# Patient Record
Sex: MALE
[De-identification: names, ages, dates, MRNs, and addresses within clinical notes are randomized per-mention and may not be internally consistent; named-entity substitution may affect disease eponyms.]

---

## 2017-12-10 PROBLEM — N32.89 BLADDER SPASMS: Status: ACTIVE | Noted: 2017-12-10

## 2018-03-21 PROBLEM — C67.9 MALIGNANT NEOPLASM OF URINARY BLADDER, UNSPECIFIED SITE (HCC): Status: ACTIVE | Noted: 2018-03-21

## 2018-10-16 PROBLEM — M54.41 CHRONIC BILATERAL LOW BACK PAIN WITH BILATERAL SCIATICA: Status: ACTIVE | Noted: 2018-10-16

## 2018-10-16 PROBLEM — M54.42 CHRONIC BILATERAL LOW BACK PAIN WITH BILATERAL SCIATICA: Status: ACTIVE | Noted: 2018-10-16

## 2018-10-16 PROBLEM — G89.29 CHRONIC BILATERAL LOW BACK PAIN WITH BILATERAL SCIATICA: Status: ACTIVE | Noted: 2018-10-16

## 2018-11-09 ENCOUNTER — HOSPITAL (OUTPATIENT)
Dept: OTHER | Age: 78
End: 2018-11-09
Attending: ORTHOPAEDIC SURGERY

## 2018-11-09 ENCOUNTER — IMAGING SERVICES (OUTPATIENT)
Dept: OTHER | Age: 78
End: 2018-11-09

## 2018-11-12 ENCOUNTER — HOSPITAL (OUTPATIENT)
Dept: OTHER | Age: 78
End: 2018-11-12
Attending: ORTHOPAEDIC SURGERY

## 2018-11-12 ENCOUNTER — IMAGING SERVICES (OUTPATIENT)
Dept: OTHER | Age: 78
End: 2018-11-12

## 2018-11-28 ENCOUNTER — LAB ENCOUNTER (OUTPATIENT)
Dept: LAB | Facility: HOSPITAL | Age: 78
End: 2018-11-28
Attending: ORTHOPAEDIC SURGERY
Payer: MEDICARE

## 2018-11-28 DIAGNOSIS — I10 ESSENTIAL HYPERTENSION: ICD-10-CM

## 2018-11-28 DIAGNOSIS — M25.551 RIGHT HIP PAIN: Primary | ICD-10-CM

## 2018-11-28 DIAGNOSIS — N18.30 CKD (CHRONIC KIDNEY DISEASE) STAGE 3, GFR 30-59 ML/MIN (HCC): ICD-10-CM

## 2018-11-28 DIAGNOSIS — E79.0 HYPERURICEMIA: ICD-10-CM

## 2018-11-28 PROCEDURE — 86140 C-REACTIVE PROTEIN: CPT

## 2018-11-28 PROCEDURE — 85025 COMPLETE CBC W/AUTO DIFF WBC: CPT

## 2018-11-28 PROCEDURE — 85652 RBC SED RATE AUTOMATED: CPT

## 2018-11-28 PROCEDURE — 84550 ASSAY OF BLOOD/URIC ACID: CPT

## 2018-11-28 PROCEDURE — 87086 URINE CULTURE/COLONY COUNT: CPT

## 2018-11-28 PROCEDURE — 80053 COMPREHEN METABOLIC PANEL: CPT

## 2018-11-28 PROCEDURE — 85610 PROTHROMBIN TIME: CPT

## 2018-11-28 PROCEDURE — 85730 THROMBOPLASTIN TIME PARTIAL: CPT

## 2018-11-28 PROCEDURE — 36415 COLL VENOUS BLD VENIPUNCTURE: CPT

## 2018-11-30 NOTE — PROGRESS NOTES
Released to [address] [place] [address] [address], along with provider's comments/instructions.

## 2018-12-05 ENCOUNTER — LAB ENCOUNTER (OUTPATIENT)
Dept: LAB | Age: 78
DRG: 467 | End: 2018-12-05
Attending: ORTHOPAEDIC SURGERY
Payer: MEDICARE

## 2018-12-05 DIAGNOSIS — M75.100 ROTATOR CUFF SYNDROME: ICD-10-CM

## 2018-12-05 DIAGNOSIS — Z01.818 PREOP TESTING: ICD-10-CM

## 2018-12-05 PROCEDURE — 86850 RBC ANTIBODY SCREEN: CPT

## 2018-12-05 PROCEDURE — 86900 BLOOD TYPING SEROLOGIC ABO: CPT

## 2018-12-05 PROCEDURE — 36415 COLL VENOUS BLD VENIPUNCTURE: CPT

## 2018-12-05 PROCEDURE — 87641 MR-STAPH DNA AMP PROBE: CPT

## 2018-12-05 PROCEDURE — 86901 BLOOD TYPING SEROLOGIC RH(D): CPT

## 2018-12-05 RX ORDER — ACETAMINOPHEN 500 MG
1000 TABLET ORAL ONCE
Status: DISCONTINUED | OUTPATIENT
Start: 2018-12-05 | End: 2018-12-05

## 2018-12-06 ENCOUNTER — APPOINTMENT (OUTPATIENT)
Dept: GENERAL RADIOLOGY | Facility: HOSPITAL | Age: 78
DRG: 467 | End: 2018-12-06
Attending: ORTHOPAEDIC SURGERY
Payer: MEDICARE

## 2018-12-06 ENCOUNTER — ANESTHESIA EVENT (OUTPATIENT)
Dept: SURGERY | Facility: HOSPITAL | Age: 78
DRG: 467 | End: 2018-12-06
Payer: MEDICARE

## 2018-12-06 ENCOUNTER — ANESTHESIA (OUTPATIENT)
Dept: SURGERY | Facility: HOSPITAL | Age: 78
DRG: 467 | End: 2018-12-06
Payer: MEDICARE

## 2018-12-06 ENCOUNTER — HOSPITAL ENCOUNTER (INPATIENT)
Facility: HOSPITAL | Age: 78
LOS: 5 days | Discharge: NURSING, REHAB OR LONG-TERM CARE | DRG: 467 | End: 2018-12-11
Attending: ORTHOPAEDIC SURGERY | Admitting: ORTHOPAEDIC SURGERY
Payer: MEDICARE

## 2018-12-06 DIAGNOSIS — M75.100 ROTATOR CUFF SYNDROME: ICD-10-CM

## 2018-12-06 DIAGNOSIS — Z01.818 PREOP TESTING: Primary | ICD-10-CM

## 2018-12-06 PROCEDURE — 76001 XR C-ARM FLUORO >1 HOUR  (CPT=76001): CPT | Performed by: ORTHOPAEDIC SURGERY

## 2018-12-06 PROCEDURE — 0YU70JZ SUPPLEMENT RIGHT FEMORAL REGION WITH SYNTHETIC SUBSTITUTE, OPEN APPROACH: ICD-10-PCS | Performed by: ORTHOPAEDIC SURGERY

## 2018-12-06 PROCEDURE — 0SR90JZ REPLACEMENT OF RIGHT HIP JOINT WITH SYNTHETIC SUBSTITUTE, OPEN APPROACH: ICD-10-PCS | Performed by: ORTHOPAEDIC SURGERY

## 2018-12-06 PROCEDURE — 73502 X-RAY EXAM HIP UNI 2-3 VIEWS: CPT | Performed by: ORTHOPAEDIC SURGERY

## 2018-12-06 PROCEDURE — 0SP90JZ REMOVAL OF SYNTHETIC SUBSTITUTE FROM RIGHT HIP JOINT, OPEN APPROACH: ICD-10-PCS | Performed by: ORTHOPAEDIC SURGERY

## 2018-12-06 PROCEDURE — 0LQJ0ZZ REPAIR RIGHT HIP TENDON, OPEN APPROACH: ICD-10-PCS | Performed by: ORTHOPAEDIC SURGERY

## 2018-12-06 RX ORDER — MORPHINE SULFATE 10 MG/ML
6 INJECTION, SOLUTION INTRAMUSCULAR; INTRAVENOUS EVERY 10 MIN PRN
Status: DISCONTINUED | OUTPATIENT
Start: 2018-12-06 | End: 2018-12-06 | Stop reason: HOSPADM

## 2018-12-06 RX ORDER — BISACODYL 10 MG
10 SUPPOSITORY, RECTAL RECTAL
Status: DISCONTINUED | OUTPATIENT
Start: 2018-12-06 | End: 2018-12-11

## 2018-12-06 RX ORDER — SODIUM CHLORIDE, SODIUM LACTATE, POTASSIUM CHLORIDE, CALCIUM CHLORIDE 600; 310; 30; 20 MG/100ML; MG/100ML; MG/100ML; MG/100ML
INJECTION, SOLUTION INTRAVENOUS CONTINUOUS
Status: DISCONTINUED | OUTPATIENT
Start: 2018-12-06 | End: 2018-12-11

## 2018-12-06 RX ORDER — CEFAZOLIN SODIUM/WATER 2 G/20 ML
2 SYRINGE (ML) INTRAVENOUS ONCE
Status: COMPLETED | OUTPATIENT
Start: 2018-12-06 | End: 2018-12-06

## 2018-12-06 RX ORDER — MORPHINE SULFATE 4 MG/ML
4 INJECTION, SOLUTION INTRAMUSCULAR; INTRAVENOUS EVERY 10 MIN PRN
Status: DISCONTINUED | OUTPATIENT
Start: 2018-12-06 | End: 2018-12-06 | Stop reason: HOSPADM

## 2018-12-06 RX ORDER — MODAFINIL 100 MG/1
200 TABLET ORAL DAILY
Status: DISCONTINUED | OUTPATIENT
Start: 2018-12-07 | End: 2018-12-09

## 2018-12-06 RX ORDER — SENNOSIDES 8.6 MG
17.2 TABLET ORAL NIGHTLY
Status: DISCONTINUED | OUTPATIENT
Start: 2018-12-06 | End: 2018-12-11

## 2018-12-06 RX ORDER — OXYCODONE HYDROCHLORIDE AND ACETAMINOPHEN 5; 325 MG/1; MG/1
1 TABLET ORAL EVERY 4 HOURS PRN
Status: DISCONTINUED | OUTPATIENT
Start: 2018-12-06 | End: 2018-12-11

## 2018-12-06 RX ORDER — FAMOTIDINE 20 MG/1
20 TABLET ORAL ONCE
Status: COMPLETED | OUTPATIENT
Start: 2018-12-06 | End: 2018-12-06

## 2018-12-06 RX ORDER — POLYETHYLENE GLYCOL 3350 17 G/17G
17 POWDER, FOR SOLUTION ORAL DAILY PRN
Status: DISCONTINUED | OUTPATIENT
Start: 2018-12-06 | End: 2018-12-11

## 2018-12-06 RX ORDER — FAMOTIDINE 10 MG/ML
20 INJECTION, SOLUTION INTRAVENOUS 2 TIMES DAILY
Status: DISCONTINUED | OUTPATIENT
Start: 2018-12-06 | End: 2018-12-07

## 2018-12-06 RX ORDER — SODIUM CHLORIDE 0.9 % (FLUSH) 0.9 %
10 SYRINGE (ML) INJECTION AS NEEDED
Status: DISCONTINUED | OUTPATIENT
Start: 2018-12-06 | End: 2018-12-11

## 2018-12-06 RX ORDER — MORPHINE SULFATE 4 MG/ML
2 INJECTION, SOLUTION INTRAMUSCULAR; INTRAVENOUS EVERY 10 MIN PRN
Status: DISCONTINUED | OUTPATIENT
Start: 2018-12-06 | End: 2018-12-06 | Stop reason: HOSPADM

## 2018-12-06 RX ORDER — SODIUM CHLORIDE 9 MG/ML
INJECTION, SOLUTION INTRAVENOUS CONTINUOUS
Status: DISCONTINUED | OUTPATIENT
Start: 2018-12-06 | End: 2018-12-09

## 2018-12-06 RX ORDER — VANCOMYCIN HYDROCHLORIDE 1 G/20ML
INJECTION, POWDER, LYOPHILIZED, FOR SOLUTION INTRAVENOUS AS NEEDED
Status: DISCONTINUED | OUTPATIENT
Start: 2018-12-06 | End: 2018-12-06 | Stop reason: HOSPADM

## 2018-12-06 RX ORDER — HYDROXYZINE HYDROCHLORIDE 25 MG/1
50 TABLET, FILM COATED ORAL NIGHTLY PRN
Status: DISCONTINUED | OUTPATIENT
Start: 2018-12-06 | End: 2018-12-07

## 2018-12-06 RX ORDER — ASPIRIN 325 MG
325 TABLET ORAL 2 TIMES DAILY
Status: DISCONTINUED | OUTPATIENT
Start: 2018-12-06 | End: 2018-12-11

## 2018-12-06 RX ORDER — HYDRALAZINE HYDROCHLORIDE 25 MG/1
25 TABLET, FILM COATED ORAL EVERY 8 HOURS PRN
Status: DISCONTINUED | OUTPATIENT
Start: 2018-12-06 | End: 2018-12-11

## 2018-12-06 RX ORDER — CELECOXIB 200 MG/1
200 CAPSULE ORAL EVERY 12 HOURS SCHEDULED
Status: DISCONTINUED | OUTPATIENT
Start: 2018-12-06 | End: 2018-12-11

## 2018-12-06 RX ORDER — ONDANSETRON 2 MG/ML
4 INJECTION INTRAMUSCULAR; INTRAVENOUS EVERY 4 HOURS PRN
Status: ACTIVE | OUTPATIENT
Start: 2018-12-06 | End: 2018-12-08

## 2018-12-06 RX ORDER — DIPHENHYDRAMINE HYDROCHLORIDE 50 MG/ML
12.5 INJECTION INTRAMUSCULAR; INTRAVENOUS EVERY 4 HOURS PRN
Status: DISCONTINUED | OUTPATIENT
Start: 2018-12-06 | End: 2018-12-11

## 2018-12-06 RX ORDER — METOPROLOL SUCCINATE 25 MG/1
25 TABLET, EXTENDED RELEASE ORAL DAILY
Status: DISCONTINUED | OUTPATIENT
Start: 2018-12-07 | End: 2018-12-11

## 2018-12-06 RX ORDER — NALOXONE HYDROCHLORIDE 0.4 MG/ML
80 INJECTION, SOLUTION INTRAMUSCULAR; INTRAVENOUS; SUBCUTANEOUS AS NEEDED
Status: DISCONTINUED | OUTPATIENT
Start: 2018-12-06 | End: 2018-12-06 | Stop reason: HOSPADM

## 2018-12-06 RX ORDER — SODIUM PHOSPHATE, DIBASIC AND SODIUM PHOSPHATE, MONOBASIC 7; 19 G/133ML; G/133ML
1 ENEMA RECTAL ONCE AS NEEDED
Status: DISCONTINUED | OUTPATIENT
Start: 2018-12-06 | End: 2018-12-11

## 2018-12-06 RX ORDER — SCOLOPAMINE TRANSDERMAL SYSTEM 1 MG/1
1 PATCH, EXTENDED RELEASE TRANSDERMAL ONCE
Status: DISCONTINUED | OUTPATIENT
Start: 2018-12-06 | End: 2018-12-09

## 2018-12-06 RX ORDER — ESCITALOPRAM OXALATE 10 MG/1
20 TABLET ORAL DAILY
Status: DISCONTINUED | OUTPATIENT
Start: 2018-12-07 | End: 2018-12-11

## 2018-12-06 RX ORDER — PRAVASTATIN SODIUM 20 MG
20 TABLET ORAL DAILY
Status: DISCONTINUED | OUTPATIENT
Start: 2018-12-07 | End: 2018-12-11

## 2018-12-06 RX ORDER — DIPHENHYDRAMINE HYDROCHLORIDE 50 MG/ML
25 INJECTION INTRAMUSCULAR; INTRAVENOUS ONCE AS NEEDED
Status: ACTIVE | OUTPATIENT
Start: 2018-12-06 | End: 2018-12-06

## 2018-12-06 RX ORDER — CELECOXIB 200 MG/1
400 CAPSULE ORAL ONCE
Status: COMPLETED | OUTPATIENT
Start: 2018-12-06 | End: 2018-12-06

## 2018-12-06 RX ORDER — GLYCOPYRROLATE 0.2 MG/ML
INJECTION INTRAMUSCULAR; INTRAVENOUS AS NEEDED
Status: DISCONTINUED | OUTPATIENT
Start: 2018-12-06 | End: 2018-12-06 | Stop reason: SURG

## 2018-12-06 RX ORDER — HALOPERIDOL 5 MG/ML
0.25 INJECTION INTRAMUSCULAR ONCE AS NEEDED
Status: DISCONTINUED | OUTPATIENT
Start: 2018-12-06 | End: 2018-12-06 | Stop reason: HOSPADM

## 2018-12-06 RX ORDER — MORPHINE SULFATE 4 MG/ML
6 INJECTION, SOLUTION INTRAMUSCULAR; INTRAVENOUS EVERY 2 HOUR PRN
Status: DISCONTINUED | OUTPATIENT
Start: 2018-12-06 | End: 2018-12-11

## 2018-12-06 RX ORDER — MAGNESIUM HYDROXIDE 1200 MG/15ML
LIQUID ORAL CONTINUOUS PRN
Status: COMPLETED | OUTPATIENT
Start: 2018-12-06 | End: 2018-12-06

## 2018-12-06 RX ORDER — MIDAZOLAM HYDROCHLORIDE 1 MG/ML
INJECTION INTRAMUSCULAR; INTRAVENOUS AS NEEDED
Status: DISCONTINUED | OUTPATIENT
Start: 2018-12-06 | End: 2018-12-06 | Stop reason: SURG

## 2018-12-06 RX ORDER — EPHEDRINE SULFATE 50 MG/ML
INJECTION, SOLUTION INTRAVENOUS AS NEEDED
Status: DISCONTINUED | OUTPATIENT
Start: 2018-12-06 | End: 2018-12-06 | Stop reason: SURG

## 2018-12-06 RX ORDER — LABETALOL HYDROCHLORIDE 5 MG/ML
55 INJECTION, SOLUTION INTRAVENOUS ONCE
Status: DISCONTINUED | OUTPATIENT
Start: 2018-12-06 | End: 2018-12-11

## 2018-12-06 RX ORDER — FAMOTIDINE 20 MG/1
20 TABLET ORAL 2 TIMES DAILY
Status: DISCONTINUED | OUTPATIENT
Start: 2018-12-06 | End: 2018-12-07

## 2018-12-06 RX ORDER — OXYCODONE HYDROCHLORIDE AND ACETAMINOPHEN 5; 325 MG/1; MG/1
2 TABLET ORAL EVERY 4 HOURS PRN
Status: DISCONTINUED | OUTPATIENT
Start: 2018-12-06 | End: 2018-12-11

## 2018-12-06 RX ORDER — SODIUM CHLORIDE, SODIUM LACTATE, POTASSIUM CHLORIDE, CALCIUM CHLORIDE 600; 310; 30; 20 MG/100ML; MG/100ML; MG/100ML; MG/100ML
INJECTION, SOLUTION INTRAVENOUS CONTINUOUS
Status: DISCONTINUED | OUTPATIENT
Start: 2018-12-06 | End: 2018-12-06 | Stop reason: HOSPADM

## 2018-12-06 RX ORDER — MORPHINE SULFATE 2 MG/ML
2 INJECTION, SOLUTION INTRAMUSCULAR; INTRAVENOUS EVERY 2 HOUR PRN
Status: DISCONTINUED | OUTPATIENT
Start: 2018-12-06 | End: 2018-12-11

## 2018-12-06 RX ORDER — DOCUSATE SODIUM 100 MG/1
100 CAPSULE, LIQUID FILLED ORAL 2 TIMES DAILY
Status: DISCONTINUED | OUTPATIENT
Start: 2018-12-06 | End: 2018-12-11

## 2018-12-06 RX ORDER — LOSARTAN POTASSIUM 25 MG/1
25 TABLET ORAL DAILY
Status: DISCONTINUED | OUTPATIENT
Start: 2018-12-07 | End: 2018-12-11

## 2018-12-06 RX ORDER — ONDANSETRON 2 MG/ML
4 INJECTION INTRAMUSCULAR; INTRAVENOUS ONCE AS NEEDED
Status: DISCONTINUED | OUTPATIENT
Start: 2018-12-06 | End: 2018-12-06 | Stop reason: HOSPADM

## 2018-12-06 RX ORDER — LABETALOL HYDROCHLORIDE 5 MG/ML
INJECTION, SOLUTION INTRAVENOUS AS NEEDED
Status: DISCONTINUED | OUTPATIENT
Start: 2018-12-06 | End: 2018-12-06 | Stop reason: SURG

## 2018-12-06 RX ORDER — NEOSTIGMINE METHYLSULFATE 0.5 MG/ML
INJECTION INTRAVENOUS AS NEEDED
Status: DISCONTINUED | OUTPATIENT
Start: 2018-12-06 | End: 2018-12-06 | Stop reason: SURG

## 2018-12-06 RX ORDER — MORPHINE SULFATE 4 MG/ML
4 INJECTION, SOLUTION INTRAMUSCULAR; INTRAVENOUS EVERY 2 HOUR PRN
Status: DISCONTINUED | OUTPATIENT
Start: 2018-12-06 | End: 2018-12-11

## 2018-12-06 RX ORDER — ROCURONIUM BROMIDE 10 MG/ML
INJECTION, SOLUTION INTRAVENOUS AS NEEDED
Status: DISCONTINUED | OUTPATIENT
Start: 2018-12-06 | End: 2018-12-06 | Stop reason: SURG

## 2018-12-06 RX ORDER — MORPHINE SULFATE 15 MG/1
15 TABLET ORAL EVERY 4 HOURS PRN
Status: DISCONTINUED | OUTPATIENT
Start: 2018-12-06 | End: 2018-12-11

## 2018-12-06 RX ORDER — LIDOCAINE HYDROCHLORIDE 10 MG/ML
INJECTION, SOLUTION EPIDURAL; INFILTRATION; INTRACAUDAL; PERINEURAL AS NEEDED
Status: DISCONTINUED | OUTPATIENT
Start: 2018-12-06 | End: 2018-12-06 | Stop reason: SURG

## 2018-12-06 RX ORDER — ACETAMINOPHEN 10 MG/ML
1000 INJECTION, SOLUTION INTRAVENOUS ONCE
Status: COMPLETED | OUTPATIENT
Start: 2018-12-06 | End: 2018-12-06

## 2018-12-06 RX ORDER — DIPHENHYDRAMINE HCL 25 MG
25 CAPSULE ORAL EVERY 4 HOURS PRN
Status: DISCONTINUED | OUTPATIENT
Start: 2018-12-06 | End: 2018-12-11

## 2018-12-06 RX ORDER — CEFAZOLIN SODIUM/WATER 2 G/20 ML
2 SYRINGE (ML) INTRAVENOUS EVERY 8 HOURS
Status: COMPLETED | OUTPATIENT
Start: 2018-12-06 | End: 2018-12-07

## 2018-12-06 RX ORDER — METOCLOPRAMIDE HYDROCHLORIDE 5 MG/ML
10 INJECTION INTRAMUSCULAR; INTRAVENOUS EVERY 6 HOURS PRN
Status: ACTIVE | OUTPATIENT
Start: 2018-12-06 | End: 2018-12-08

## 2018-12-06 RX ORDER — ALLOPURINOL 300 MG/1
300 TABLET ORAL DAILY
Status: DISCONTINUED | OUTPATIENT
Start: 2018-12-07 | End: 2018-12-11

## 2018-12-06 RX ORDER — ZOLPIDEM TARTRATE 5 MG/1
5 TABLET ORAL NIGHTLY PRN
Status: DISCONTINUED | OUTPATIENT
Start: 2018-12-06 | End: 2018-12-11

## 2018-12-06 RX ORDER — CLONAZEPAM 1 MG/1
1 TABLET ORAL NIGHTLY PRN
Status: DISCONTINUED | OUTPATIENT
Start: 2018-12-06 | End: 2018-12-07

## 2018-12-06 RX ADMIN — MIDAZOLAM HYDROCHLORIDE 2 MG: 1 INJECTION INTRAMUSCULAR; INTRAVENOUS at 14:03:00

## 2018-12-06 RX ADMIN — LABETALOL HYDROCHLORIDE 5 MG: 5 INJECTION, SOLUTION INTRAVENOUS at 16:14:00

## 2018-12-06 RX ADMIN — EPHEDRINE SULFATE 5 MG: 50 INJECTION, SOLUTION INTRAVENOUS at 15:47:00

## 2018-12-06 RX ADMIN — LABETALOL HYDROCHLORIDE 5 MG: 5 INJECTION, SOLUTION INTRAVENOUS at 16:11:00

## 2018-12-06 RX ADMIN — EPHEDRINE SULFATE 5 MG: 50 INJECTION, SOLUTION INTRAVENOUS at 15:30:00

## 2018-12-06 RX ADMIN — NEOSTIGMINE METHYLSULFATE 5 MG: 0.5 INJECTION INTRAVENOUS at 16:01:00

## 2018-12-06 RX ADMIN — GLYCOPYRROLATE 0.6 MG: 0.2 INJECTION INTRAMUSCULAR; INTRAVENOUS at 16:01:00

## 2018-12-06 RX ADMIN — ROCURONIUM BROMIDE 40 MG: 10 INJECTION, SOLUTION INTRAVENOUS at 14:07:00

## 2018-12-06 RX ADMIN — EPHEDRINE SULFATE 5 MG: 50 INJECTION, SOLUTION INTRAVENOUS at 15:34:00

## 2018-12-06 RX ADMIN — EPHEDRINE SULFATE 5 MG: 50 INJECTION, SOLUTION INTRAVENOUS at 15:40:00

## 2018-12-06 RX ADMIN — ACETAMINOPHEN 1000 MG: 10 INJECTION, SOLUTION INTRAVENOUS at 15:49:00

## 2018-12-06 RX ADMIN — LIDOCAINE HYDROCHLORIDE 50 MG: 10 INJECTION, SOLUTION EPIDURAL; INFILTRATION; INTRACAUDAL; PERINEURAL at 14:07:00

## 2018-12-06 RX ADMIN — CEFAZOLIN SODIUM/WATER 2 G: 2 G/20 ML SYRINGE (ML) INTRAVENOUS at 14:20:00

## 2018-12-06 RX ADMIN — SODIUM CHLORIDE, SODIUM LACTATE, POTASSIUM CHLORIDE, CALCIUM CHLORIDE: 600; 310; 30; 20 INJECTION, SOLUTION INTRAVENOUS at 13:58:00

## 2018-12-06 NOTE — ANESTHESIA POSTPROCEDURE EVALUATION
Patient: Linda Carvajal    Procedure Summary     Date:  12/06/18 Room / Location:  [facility] OR [address] [place] [address] OR    Anesthesia Start:  4948 Anesthesia Stop:      Procedure:  HIP TOTAL ARTHROPLASTY REVISION (Right ) Diagnosis:  (periprosthetic osteolysis o

## 2018-12-06 NOTE — INTERVAL H&P NOTE
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

## 2018-12-06 NOTE — OPERATIVE REPORT
Operative Report  2018    Meghan Brooks Patient Status:  Surgery Admit    1940 MRN [id number]   Location [facility] Attending Jignesh Solomon DO   Date of Procedure  2018 KEN DUDLEY CULTURE, TISSUE AEROBIC CULTURE Kashif Enriquez DO 12/6/2018 1504        Drains: None     Condition: Stable to PACU    Estimated Blood Loss: 300 cc    History of Present Illness:  This is a pleasant 67-year-old male with history of hip replacement approxim of the greater trochanter. We then placed a Charnley on the IT band. We then opened up the posterior capsule and there was murky brown fluid in there. This was cultured. We took a soft tissue dissection as well incentive.   This tissue was murky fluid w irrigated out the hip. We did R. Took her soft tissue injection. We sprinkled a gram of vancomycin powder into the wound. We closed the vastus lateralis using 0 Vicryl. We then turned our attention to the abductor tendon tear.   We closed it with multi

## 2018-12-06 NOTE — H&P
[facility] Hospitalist H&P       CC: No chief complaint on file.        PCP: Segundo Ferro MD    ASSESSMENT / PLAN:    Patient is a 69year old male with PMH sig for bladder ca s/p chemo, BPH, CKD 3, CAD, depression, HTN, HLD, SAM on cpap, PVD, chronic low ba obesity bmi 36, ARRIOLA, OA with prior R hip replacement    Patient and/or patient's family given opportunity to ask questions and note understanding and agree with therapeutic plan as outlined      Jasmine Elizabeth DO    [facility] Hospitalist  Answering Service number: Disp: 90 tablet Rfl: 3   Diclofenac Sodium (VOLTAREN) 1 % Transdermal Gel Apply 4 g topically 3 (three) times daily. Disp: 3 Tube Rfl: 3   Oxybutynin Chloride 5 MG Oral Tab Take 1 tablet (5 mg total) by mouth 2 (two) times daily.  Disp: 180 tablet Rfl: 3 Systems  Pertinent positives and negatives noted above in HPI, all other systems reviewed and are negative     OBJECTIVE:  /71   Pulse 64   Temp 98.5 °F (36.9 °C)   Resp 13   Ht 167.6 cm (5' 6\")   Wt 228 lb (103.4 kg)   SpO2 98%   BMI 36.80 kg/m²

## 2018-12-06 NOTE — ANESTHESIA PROCEDURE NOTES
ANESTHESIA INTUBATION  Urgency: elective      General Information and Staff    Patient location during procedure: OR  Anesthesiologist: Imtiaz Viera MD  Resident/CRNA: Afua Avila CRNA  Performed: CRNA     Indications and Patient Condition  Indications

## 2018-12-06 NOTE — ANESTHESIA PREPROCEDURE EVALUATION
Anesthesia PreOp Note    HPI:     Willie Loera is a 69year old male who presents for preoperative consultation requested by: Alejandra Bruno DO    Date of Surgery: 12/6/2018    Procedure(s):  HIP TOTAL ARTHROPLASTY REVISION  Indication: periprostheti • High cholesterol    • Hyperuricemia    • Insomnia    • Ischemic cardiomyopathy 2016    Last ECHO 8/17, EF > 50%, concentric LVH   • ARRIOLA (nonalcoholic steatohepatitis) 2016   • Obesity, Class III, BMI 40-49.9 (morbid obesity) ([facility] [address]    • Osteoarthritis Losartan Potassium 50 MG Oral Tab Take 1 tablet (50 mg total) by mouth daily.  (Patient taking differently: Take 25 mg by mouth daily.  ) Disp: 90 tablet Rfl: 3 12/5/2018 at 2100   modafinil (PROVIGIL) 200 MG Oral Tab  Disp:  Rfl:  12/5/2018 at 1200   aspir Packs/day: 0.75        Years: 20.00        Pack years: 15        Types: Cigarettes        Quit date: 2014        Years since quittin.9      Smokeless tobacco: Former User    Substance and Sexual Activity      Alcohol use:  Yes        Alcohol/w ECG reviewed  Rhythm: regular  Rate: normal  ROS comment: Cardiac and medical clearance   Diffuse CAD ,no reversible defects  No stants no CABG suggested     Neuro/Psych    (+) neuromuscular disease, psychiatric history    Comments: Back stim    GI/Hepatic

## 2018-12-07 ENCOUNTER — APPOINTMENT (OUTPATIENT)
Dept: GENERAL RADIOLOGY | Facility: HOSPITAL | Age: 78
DRG: 467 | End: 2018-12-07
Attending: HOSPITALIST
Payer: MEDICARE

## 2018-12-07 ENCOUNTER — APPOINTMENT (OUTPATIENT)
Dept: CT IMAGING | Facility: HOSPITAL | Age: 78
DRG: 467 | End: 2018-12-07
Attending: HOSPITALIST
Payer: MEDICARE

## 2018-12-07 PROCEDURE — 70450 CT HEAD/BRAIN W/O DYE: CPT | Performed by: HOSPITALIST

## 2018-12-07 PROCEDURE — 71045 X-RAY EXAM CHEST 1 VIEW: CPT | Performed by: HOSPITALIST

## 2018-12-07 RX ORDER — FAMOTIDINE 20 MG/1
20 TABLET ORAL DAILY
Status: DISCONTINUED | OUTPATIENT
Start: 2018-12-08 | End: 2018-12-11

## 2018-12-07 RX ORDER — ACETAMINOPHEN 325 MG/1
650 TABLET ORAL EVERY 6 HOURS PRN
Status: DISCONTINUED | OUTPATIENT
Start: 2018-12-07 | End: 2018-12-11

## 2018-12-07 RX ORDER — FAMOTIDINE 10 MG/ML
20 INJECTION, SOLUTION INTRAVENOUS DAILY
Status: DISCONTINUED | OUTPATIENT
Start: 2018-12-08 | End: 2018-12-11

## 2018-12-07 NOTE — OCCUPATIONAL THERAPY NOTE
OCCUPATIONAL THERAPY EVALUATION - INPATIENT      Room Number: 415/415-A  Evaluation Date: 12/7/2018  Type of Evaluation: Initial  Presenting Problem: (R JOHN revision w/abd. repair; posterior; no active abd.)    Physician Order: IP Consult to Occupational T OT to address the above deficits, maximizing patient's ability to return to prior level of function.       DISCHARGE RECOMMENDATIONS  OT Discharge Recommendations: 24 hour care/supervision;Sub-acute rehabilitation  OT Device Recommendations: TBD    PLAN  OT Uses: (RW)    Stairs in Home: 0STE; 14 inside home   Assistive Device(s) Used: RW     Prior Level of Cougar: Pt lives alone, reports being independent with ADLs, IADLs, and driving prior. Pt was using a RW.      SUBJECTIVE  Pt is confused, however  assistance(2-3)  Sit to Stand: Maximum assistance(2-3)  Toilet Transfer: n/t   Shower Transfer: n/t   Chair Transfer: n/t  Car Transfer: n/t     Bedroom Mobility: n/t     BALANCE ASSESSMENT  Static Sitting: mod-min. A   Dynamic Sitting: mod.  A   Static Sta

## 2018-12-07 NOTE — PROGRESS NOTES
[address] [facility] [address] [facility] [address] Patient Status:  Inpatient    1940 MRN [id number]   Location [facility] 4W/SW/SE Attending Jason, [address] Day # 1 PCP Segundo Ferro MD     Subjective:  Procedure(s):  revision of right to 2 Or 3 Views, Right (cpt=73502)    Result Date: 12/6/2018  CONCLUSION:    * Total right hip arthroplasty is identified. * The prosthesis is well seated in the femur and well directed towards the prosthetic acetabulum.  * There is a defect in the proximal fe

## 2018-12-07 NOTE — PHYSICAL THERAPY NOTE
PHYSICAL THERAPY HIP EVALUATION - INPATIENT     Room Number: 415/415-A  Evaluation Date: 12/7/2018  Type of Evaluation: Initial  Physician Order: PT Eval and Treat    Presenting Problem: right arthroplasty with need for revision with noted prox femur fx -- remain in bed at this time after mobility due to confusion for pt safety. Pt with plans for head CT later today due to new onset confusion. Pt with 2-3 person assist used for all mobility this session. Pt did return demonstration for ankle pumps. with pt and pt dtr focus on post op precautions . Handout left in room. Reveiwed with pt and pt dtr ankle pumps and incentive spirometer.  Pt able to return demonstration of both ankle pumps with cues and incentive spirometer with assist .   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, nondisplaced fracture right femoral shaft      Surgeon(s) and Role:      * Jason Personal history of antineoplastic chemotherapy    • Sleep apnea     CPAP       Past Surgical History  Past Surgical History:   Procedure Laterality Date   • CAROTID ENDARTERECTOMY Bilateral 2010   • HIP TOTAL ARTHROPLASTY REVISION Right 12/6/2018    French safety  · Problem Solving:  unable to assess    RANGE OF MOTION AND STRENGTH ASSESSMENT  Pt B UE appear grossly WFL  Pt with symm  strength   LE AROM on  right LE not formally assessed post sx      Pt right knee and ankle ROM appear grossly WFL during tested           Stoop/Curb Assistance: Not tested  Comment : sit to stand from EOB   2 A needed  pt was able to take 2-3 small side steps to step toward [facility] with 2 A       Patient End of Session: In bed;Needs met;Call light within reach;RN aware of chikis

## 2018-12-07 NOTE — PHYSICAL THERAPY NOTE
Chart reviewed      Attempt x 1  In AM  Pt is very confused RN asking therapy to come back later in AM       Therapy did talk with ortho MD this AM---confirmed pt is WBAT post sx   Pt with post hip precautions     Noted abd tendon repair in sx report.    Pe

## 2018-12-07 NOTE — PROGRESS NOTES
[facility] Hospitalist Progress Note     CC: Hospital Follow up    PCP: Martell Lorenzo MD       Assessment/Plan:    Patient is a 69year old male with PMH sig for bladder ca s/p chemo, BPH, CKD 3, CAD, depression, HTN, HLD, SAM on cpap, PVD, chronic low back endeavors     Insomnia  -home meds given encephalopathy     FEN:  - IVF:per protocol  - Diet:adat   - Lytes:in am     DVT Prophy:ecotrin BID  Dispo: pending clinical course        Patient and/or patient's family given opportunity to ask questions and note 55*   GFRNAA  48*   CA  8.6   NA  136   K  4.2   CL  110   CO2  21*       No results for input(s): ALT, AST, ALB, AMYLASE, LIPASE, LDH in the last 168 hours.     Invalid input(s): ALPHOS, TBIL, DBIL, TPROT      Imaging:  Ct Brain Or Head ([id number])    Result Meds:     • [START ON 12/8/2018] famoTIDine  20 mg Oral Daily    Or   • [START ON 12/8/2018] famoTIDine  20 mg Intravenous Daily   • scopolamine  1 patch Transdermal Once   • aspirin  325 mg Oral BID   • celecoxib  200 mg Oral Q12H   • Senna  17.2

## 2018-12-07 NOTE — PROGRESS NOTES
[facility] Pharmacy Note:  Renal Dose Adjustment    Marisabel Monte has been prescribed famotidine (PEPCID) 20 mg orally every 12 hours. Estimated Creatinine Clearance: 39.6 mL/min (based on SCr of 1.41 mg/dL).     His calculated creatinine clearance is <50 ml

## 2018-12-07 NOTE — CM/SW NOTE
Patient has been accepted to [facility] and possibly accepted to [facility] pending final decision.  Son wants referral to be sent to [facility] explained the differen

## 2018-12-07 NOTE — CM/SW NOTE
CAN called patient's son Ashvin([phone]) to discuss rehab choices. Referrals sent to the following facilities:  Mikayla Herrera, [facility]. JULIEN gaitan requested.     Kimberli Espinoza

## 2018-12-08 RX ORDER — SODIUM CHLORIDE 9 MG/ML
INJECTION, SOLUTION INTRAVENOUS
Status: DISPENSED
Start: 2018-12-08 | End: 2018-12-09

## 2018-12-08 RX ORDER — QUETIAPINE 25 MG/1
50 TABLET, FILM COATED ORAL NIGHTLY
Status: DISCONTINUED | OUTPATIENT
Start: 2018-12-08 | End: 2018-12-09

## 2018-12-08 RX ORDER — HALOPERIDOL 5 MG/ML
1 INJECTION INTRAMUSCULAR EVERY 6 HOURS PRN
Status: DISCONTINUED | OUTPATIENT
Start: 2018-12-08 | End: 2018-12-11

## 2018-12-08 RX ORDER — LORAZEPAM 2 MG/ML
0.5 INJECTION INTRAMUSCULAR EVERY 6 HOURS PRN
Status: DISCONTINUED | OUTPATIENT
Start: 2018-12-08 | End: 2018-12-11

## 2018-12-08 NOTE — OCCUPATIONAL THERAPY NOTE
OCCUPATIONAL THERAPY TREATMENT NOTE - INPATIENT    Room Number: 208/884-E         Presenting Problem: (R JOHN revision w/abd. repair; posterior; no active abd.)     Problem List  Active Problems:    Preop testing      ASSESSMENT   Pt seen for OT treatment. Extremity: Weight Bearing as Tolerated       PAIN ASSESSMENT  Ratin  Location: R hip  Management Techniques: Repositioning     ACTIVITY TOLERANCE  Room air    ACTIVITIES OF DAILY LIVING ASSESSMENT  AM-PAC ‘6-Clicks’ Inpatient Daily Activity Short Form with mod. A x1 w/RW and adhere to posterior hip precautions. Comment: Progressing    Patient will complete self care tasks standing at sink level with min. A for balance & grooming.    Comment: Progressing    Patient will independently recall posterior hi

## 2018-12-08 NOTE — PROGRESS NOTES
[address] [facility] [address] Patient Status:  Inpatient    1940 MRN [id number]   Location [facility] 4W/SW/SE Attending Jason, [address] Day # 2 PCP Gosia Hare MD     Subjective:  Procedure(s):  revision of right to (cpt=71045)    Result Date: 12/7/2018  CONCLUSION:  1. No acute findings.      Dictated by (CST): Norman Scott MD on 12/07/2018 at 16:04     Approved by (CST): Kaleb Morin MD on 12/07/2018 at 16:04          Xr Hip W Or Wo Pelvis 2 Or

## 2018-12-08 NOTE — RESPIRATORY THERAPY NOTE
SAM ASSESSMENT:    Pt does have a previous diagnosis of SAM. Pt does routinely use a CPAP device at home. This pt is suspected to be at high risk for SAM and sleep lab packet was not provided to patient for outpatient follow-up.     CPAP INITIATION:    Pt u

## 2018-12-08 NOTE — PHYSICAL THERAPY NOTE
PHYSICAL THERAPY TREATMENT NOTE - INPATIENT     Room Number: 973/006-D       Presenting Problem: right arthroplasty with need for revision with noted prox femur fx ---pt is s/p right hip revision posterior approach  with repair of fx and hip abd tendon rep commode, etc.): A Lot   -   Moving from lying on back to sitting on the side of the bed?: A Lot   How much help from another person does the patient currently need. ..   -   Moving to and from a bed to a chair (including a wheelchair)?: A Lot   -   Need to ROM/strengthening per the instructions provided in preparation for discharge.    Goal #6  Current Status initated ankle pumps only and fxn mobility tasks pt with difficulty following directions,

## 2018-12-08 NOTE — PROGRESS NOTES
[facility] Hospitalist Progress Note     CC: Hospital Follow up    PCP: Estephania Choi MD       Assessment/Plan:    Patient is a 69year old male with PMH sig for bladder ca s/p chemo, BPH, CKD 3, CAD, depression, HTN, HLD, SAM on cpap, PVD, chronic low back bilateral sciatica/chronic opiate use  -percocet 3x/day baseline     Obesity- BMI 36  -o/p weight loss endeavors     Insomnia  -hold home meds for now given encephalopathy, poss resume per psych eval     FEN:  - IVF:per protocol, decrease, pos d/c tomorrow 0744  12/08/18   0431   RBC  3.25*  3.06*   HGB  10.3*  9.5*   HCT  30.9*  28.5*   MCV  95.2  93.2   MCH  31.6  30.9   MCHC  33.2  33.2   RDW  15.9*  15.9*   WBC  9.0  7.4   PLT  91*  84*         Recent Labs   Lab  12/07/18   0526  12/08/18   1411   GLU  1 wires are seen stabilizing the femur. * No obvious complication. * Total left hip arthroplasty partially visualized. * Postop changes are seen at the lumbosacral junction. * Electrodes are seen in the lower pelvis.  * Skin staples are seen about the right h

## 2018-12-09 PROCEDURE — 90792 PSYCH DIAG EVAL W/MED SRVCS: CPT | Performed by: OTHER

## 2018-12-09 RX ORDER — ARIPIPRAZOLE 2 MG/1
1 TABLET ORAL ONCE
Status: COMPLETED | OUTPATIENT
Start: 2018-12-09 | End: 2018-12-09

## 2018-12-09 RX ORDER — QUETIAPINE 25 MG/1
100 TABLET, FILM COATED ORAL NIGHTLY
Status: DISCONTINUED | OUTPATIENT
Start: 2018-12-09 | End: 2018-12-11

## 2018-12-09 NOTE — PLAN OF CARE
Delirium    • Minimize duration of delirium Progressing        DISCHARGE PLANNING    • Discharge to home or other facility with appropriate resources Progressing        HEMATOLOGIC - ADULT    • Maintains hematologic stability Progressing    • Free from ble

## 2018-12-09 NOTE — PROGRESS NOTES
[facility] Hospitalist Progress Note     CC: Hospital Follow up    PCP: Violeta Velasquez MD       Assessment/Plan:    Patient is a 69year old male with PMH sig for bladder ca s/p chemo, BPH, CKD 3, CAD, depression, HTN, HLD, SAM on cpap, PVD, chronic low back plavix     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 cataplexy  -provigil held with insomnia      Chronic bilateral low back pain with bilateral sciatica/chronic opiate use  -percocet 3x/day baseline     Obesity- BMI 36  -o/p weight loss endeavors     Insomnia  -seroquel 10.3*  9.5*  8.9*   HCT  30.9*  28.5*  26.8*   MCV  95.2  93.2  93.0   MCH  31.6  30.9  31.0   MCHC  33.2  33.2  33.3   RDW  15.9*  15.9*  15.3*   WBC  9.0  7.4  5.0   PLT  91*  84*  80*         Recent Labs   Lab  12/07/18   0526  12/08/18   1411   GLU  1 wires are seen stabilizing the femur. * No obvious complication. * Total left hip arthroplasty partially visualized. * Postop changes are seen at the lumbosacral junction. * Electrodes are seen in the lower pelvis.  * Skin staples are seen about the right h

## 2018-12-09 NOTE — PHYSICAL THERAPY NOTE
PHYSICAL THERAPY HIP TREATMENT NOTE - INPATIENT    Room Number: 576/950-V            Presenting Problem: right arthroplasty with need for revision with noted prox femur fx ---pt is s/p right hip revision posterior approach  with repair of fx and hip abd te +  ACTIVITY TOLERANCE                         O2 WALK                  AM-PAC '6-Clicks' INPATIENT SHORT FORM - BASIC MOBILITY  How much difficulty does the patient currently have. ..  -   Turning over in bed (including adjusting bedclothes, sheets and blan to/from Stand at assistance level:CGA      Goal #2  Current Status Mod A with the RW   Goal #3 Patient is able to ambulate 25-50  feet with assistive device at assistance level: min assist    Goal #3   Current Status 40'x2 with the RW min A    Goal #4    G

## 2018-12-09 NOTE — PLAN OF CARE
SKIN/TISSUE INTEGRITY - ADULT    • Skin integrity remains intact Progressing    • Incision(s), wounds(s) or drain site(s) healing without S/S of infection Progressing        Right hip incision with bryce dressing-old drainage noted. Lneard=15.  Assisting pat

## 2018-12-10 PROCEDURE — 99233 SBSQ HOSP IP/OBS HIGH 50: CPT | Performed by: OTHER

## 2018-12-10 RX ORDER — ASPIRIN 325 MG
325 TABLET ORAL 2 TIMES DAILY
Qty: 56 TABLET | Refills: 0 | Status: ON HOLD | OUTPATIENT
Start: 2018-12-10 | End: 2018-12-21

## 2018-12-10 RX ORDER — POLYETHYLENE GLYCOL 3350 17 G/17G
17 POWDER, FOR SOLUTION ORAL DAILY PRN
Refills: 0 | Status: SHIPPED | COMMUNITY
Start: 2018-12-10 | End: 2019-01-01 | Stop reason: ALTCHOICE

## 2018-12-10 RX ORDER — QUETIAPINE 100 MG/1
100 TABLET, FILM COATED ORAL NIGHTLY
Qty: 15 TABLET | Refills: 0 | Status: SHIPPED | OUTPATIENT
Start: 2018-12-10 | End: 2019-01-01

## 2018-12-10 RX ORDER — PSEUDOEPHEDRINE HCL 30 MG
100 TABLET ORAL 2 TIMES DAILY
Refills: 0 | Status: SHIPPED | COMMUNITY
Start: 2018-12-10 | End: 2019-01-01

## 2018-12-10 RX ORDER — LOSARTAN POTASSIUM 25 MG/1
25 TABLET ORAL DAILY
Refills: 0 | Status: SHIPPED | COMMUNITY
Start: 2018-12-11 | End: 2019-01-01

## 2018-12-10 NOTE — PROGRESS NOTES
Patient is a 69year old  male with history of bladder cancer, BPH, CKD, CAD, HTN and depression who presented to the hospital for right hip revision.      Consult Duration     The patient seen for over 35-minute, follow-up evaluation, over 50% c vascular disease ([facility] [address] 2016   • Personal history of antineoplastic chemotherapy    • Sleep apnea     CPAP       Past Surgical History  Past Surgical History:   Procedure Laterality Date   • CAROTID ENDARTERECTOMY Bilateral 2010   • HIP TOTAL ARTHROPLASTY R 15 mg 15 mg Oral Q4H PRN   Zolpidem Tartrate (AMBIEN) tab 5 mg 5 mg Oral Nightly PRN   Senna (SENOKOT) tab TABS 17.2 mg 17.2 mg Oral Nightly   docusate sodium (COLACE) cap 100 mg 100 mg Oral BID   PEG 3350 (MIRALAX) powder packet 17 g 17 g Oral Daily PRN HYDROXYZINE HCL 50 MG Oral Tab TAKE 1 TABLET BY MOUTH EVERY NIGHT AT BEDTIME AS NEEDED FOR SLEEP   [DISCONTINUED] ClonazePAM 1 MG Oral Tab Take 1 tablet (1 mg total) by mouth nightly as needed.    [DISCONTINUED] colchicine 0.6 MG Oral Tab Take 1 tablet twic with appropriate interaction. Patient did not exhibit any psychomotor agitation or retardation. Patient reported his mood is fine and he is feeling much better. Patient exhibited brighter affect and he was smiling and interacting appropriately.   Patient

## 2018-12-10 NOTE — CM/SW NOTE
1015am- CAN received a call from [facility] and they will have a private room for the pt.   CAN notified Alisa Galvan and he is going to speak with his sister and get back to  with a decision.    --------------------------------  10am- CAN received a call from [employer]

## 2018-12-10 NOTE — CONSULTS
[facility]    Report of Consultation    Dara Hamilton Patient Status:  Inpatient    1940 MRN [id number]   Location [facility] 4W/SW/SE Attending Bridget Samm Day # 3 PCP Anson Morales MD     Date of Adm talking to himself but at least he is more redirectable today and not agitated this morning. Patient exhibited response to internal stimuli. Patient deny any substance abuse or withdrawal syndrome. Patient denied any homicidal or suicidal ideation. Mother         MI   • Cancer Mother         Breast CA   • Heart Disorder Brother        Social History  Social History    Tobacco Use      Smoking status: Current Every Day Smoker        Packs/day: 0.50        Years: 20.00        Pack years: 10        Type injection 12.5 mg 12.5 mg Intravenous Q4H PRN   morphINE sulfate (PF) 2 MG/ML injection 2 mg 2 mg Intravenous Q2H PRN   Or      morphINE sulfate (PF) 4 MG/ML injection 4 mg 4 mg Intravenous Q2H PRN   Or      morphINE sulfate (PF) 4 MG/ML injection 6 mg 6 m Systems:     As by Admitting/Attending    Results:     Laboratory Data:  Lab Results   Component Value Date    WBC 5.0 12/09/2018    HGB 8.9 (L) 12/09/2018    HCT 26.8 (L) 12/09/2018    PLT 80 (L) 12/09/2018    CREATSERUM 1.64 (H) 12/08/2018    BUN 35 (H) otherwise disorganized thought process no flight of ideation. The patient reported that he is doing fine and presented with a flat affect.   Patient denied any auditory or visual hallucination although patient exhibited response to internal stimuli and was Tissue Aerobic Culture      Meds This Visit:  Requested Prescriptions      No prescriptions requested or ordered in this encounter           Jenny Nelson MD  12/9/2018

## 2018-12-10 NOTE — PHYSICAL THERAPY NOTE
PHYSICAL THERAPY HIP TREATMENT NOTE - INPATIENT    Room Number: 318/945-Y            Presenting Problem: right arthroplasty with need for revision with noted prox femur fx ---pt is s/p right hip revision posterior approach  with repair of fx and hip abd te sub acute rehab facility. PM SESSION: Pt was received in the chair. Co treat therapy session with OT. Pt is mod A x2 with sit<>Stand transfers with the RW. Pt was able to AMB about 76' with the RW min A.  Pt with decreased shabbir and step length and r need...   -   Moving to and from a bed to a chair (including a wheelchair)?: A Little   -   Need to walk in hospital room?: A Little   -   Climbing 3-5 steps with a railing?: A Lot     AM-PAC Score:  Raw Score: 14   Approx Degree of Impairment: 61.29%   St exercise program for ROM/strengthening per the instructions provided in preparation for discharge. Goal #6  Current Status Educated and performed.

## 2018-12-10 NOTE — OCCUPATIONAL THERAPY NOTE
OCCUPATIONAL THERAPY TREATMENT NOTE - INPATIENT    Room Number: 250/501-A  Presenting Problem: (R JOHN revision w/abd. repair; posterior; no active abd.)     Problem List  Active Problems:    Preop testing    Delirium due to another medical condition    Joaquim ASSESSMENT  Rating: (pt did not rate pain)  Location: r hip  Management Techniques: Repositioning; Activity promotion     ACTIVITY TOLERANCE  Good    ACTIVITIES OF DAILY LIVING ASSESSMENT  AM-PAC ‘6-Clicks’ Inpatient Daily Activity Short Form  How much help grooming. Comment:na, completed in sitting    Patient will independently recall posterior hip precautions and adhere to them during functional activities.    Comment: pt recalled 3/3 w/o prompts           Goals  on: 2018  Frequency: 3x/week

## 2018-12-10 NOTE — PROGRESS NOTES
[facility] Hospitalist Progress Note     CC: Hospital Follow up    PCP: Violeta Velasquez MD       Assessment/Plan:    Patient is a 69year old male with PMH sig for bladder ca s/p chemo, BPH, CKD 3, CAD, depression, HTN, HLD, SAM on cpap, PVD, chronic low back ([facility] [address]  -holding asa, plavix pta  -hold baby asa while on ecotrin 325 BID     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 cataplexy  -provigil held with insomnia      Chronic bilateral low back pain with bilateral sciatica/chronic opiate use  -percocet 3x/day baseline     O cyanosis    Data Review:       Labs:     Recent Labs   Lab  12/08/18   0431  12/09/18   0517  12/10/18   0424   RBC  3.06*  2.88*  2.72*   HGB  9.5*  8.9*  8.4*   HCT  28.5*  26.8*  25.2*   MCV  93.2  93.0  92.8   MCH  30.9  31.0  30.8   MCHC  33.2  33.3

## 2018-12-11 VITALS
BODY MASS INDEX: 36.64 KG/M2 | WEIGHT: 228 LBS | OXYGEN SATURATION: 100 % | HEART RATE: 70 BPM | RESPIRATION RATE: 18 BRPM | DIASTOLIC BLOOD PRESSURE: 50 MMHG | SYSTOLIC BLOOD PRESSURE: 130 MMHG | TEMPERATURE: 98 F | HEIGHT: 66 IN

## 2018-12-11 NOTE — CM/SW NOTE
The pt. Has been approved to discharge to [facility]. The pt's dtr. In law will be providing transport. The pt. Will be discharged between 2-3p. The pt. And his son Jerri Nazario are aware and agreeable.       Report Arthur Valdez [id number]

## 2018-12-11 NOTE — PAYOR COMM NOTE
REF: [id number] APPROVED 12/6/18 - 12/9/18 REQUESTING ADDITIONAL DAYS        12/9/18  Assessment/Plan:   Melanie Atkinson is a 69year old male with PMH sig for bladder ca s/p chemo, BPH, CKD 3, CAD, HTN, HLD, SAM on cpap, PVD, chronic low back pain, obesity bmi 3 bilateral low back pain with bilateral sciatica  -percocet 3x/day baseline     Obesity- BMI 36  -o/p weight loss endeavors     Insomnia  -seroquel per psych     FEN:  - IVF:d/c, tolerating  po  - Diet:cardiac  - Lytes:in am     DVT Prophy:ecotrin BID  Disp acute CVA, chronic microvasc dz old lacunar infarct seen  -?new meds, post anesthesia effect, remove scopolamine, poss contribution from insomnia, d/c provigil  -work with pt/ot as able, frequent reorientation, verbal reminders, try to avoid narcotics, cur    OBJECTIVE:     Blood pressure 155/81, pulse 77, temperature 97.7 °F (36.5 °C), temperature source Oral, resp.  rate 16, height 167.6 cm (5' 6\"), weight 228 lb (103.4 kg), SpO2 97 %.     Temp:  [97.7 °F (36.5 °C)-98.3 °F (36.8 °C)] 97.7 °F (36.5 °C)  P

## 2018-12-11 NOTE — PLAN OF CARE
Delirium    • Minimize duration of delirium Adequate for Discharge        DISCHARGE PLANNING    • Discharge to home or other facility with appropriate resources Adequate for Discharge        HEMATOLOGIC - ADULT    • Maintains hematologic stability Adequate

## 2018-12-11 NOTE — PHYSICAL THERAPY NOTE
PHYSICAL THERAPY HIP TREATMENT NOTE - INPATIENT    Room Number: 886/434-R            Presenting Problem: right arthroplasty with need for revision with noted prox femur fx ---pt is s/p right hip revision posterior approach  with repair of fx and hip abd te from lying on back to sitting on the side of the bed?: A Lot   How much help from another person does the patient currently need. ..   -   Moving to and from a bed to a chair (including a wheelchair)?: A Little   -   Need to walk in hospital room?: A Little re-enforced. Goal #6 Patient independently performs home exercise program for ROM/strengthening per the instructions provided in preparation for discharge. Goal #6  Current Status Educated and performed.

## 2018-12-13 NOTE — DISCHARGE SUMMARY
[facility] Hospitalist Discharge Summary   Patient ID:  Camila Bernheim  [id number]  89 year old  4/4/1940    Admit date: 12/6/2018  Discharge date: 12/11/2018  Primary Care Physician: Violeta Velasquez MD   Attending Physician: No att. providers found   Consul cont during stay     Coronary artery disease  -pre-op clearance by cardiologist, instructed to hold asa and plavix 7 d pre op  This remained stable, recently seen by cardiology and cleared  -d/w ortho ok to resume plavix on d/c given pt significant CAD hx taking these medications    allopurinol 300 MG Tabs  Commonly known as:  ZYLOPRIM  Take 1 tablet (300 mg total) by mouth daily. Clopidogrel Bisulfate 75 MG Tabs  Commonly known as:  PLAVIX  Take 1 tablet (75 mg total) by mouth daily.      escitalopram 2 questions, state understanding, and agree with therapeutic plan as outlined    Larissa Hall DO  [facility] Hospitalist  Answering Service number: [phone]  12/12/2018

## 2018-12-17 ENCOUNTER — HOSPITAL ENCOUNTER (INPATIENT)
Facility: HOSPITAL | Age: 78
LOS: 5 days | Discharge: NURSING, REHAB OR LONG-TERM CARE | DRG: 467 | End: 2018-12-22
Attending: INTERNAL MEDICINE | Admitting: INTERNAL MEDICINE
Payer: MEDICARE

## 2018-12-17 DIAGNOSIS — T84.51XA INFECTED PROSTHESIS OF RIGHT HIP (HCC): ICD-10-CM

## 2018-12-17 PROBLEM — M00.9 JOINT INFECTION (HCC): Status: ACTIVE | Noted: 2018-12-17

## 2018-12-17 PROCEDURE — 85652 RBC SED RATE AUTOMATED: CPT | Performed by: ORTHOPAEDIC SURGERY

## 2018-12-17 PROCEDURE — A4216 STERILE WATER/SALINE, 10 ML: HCPCS | Performed by: INTERNAL MEDICINE

## 2018-12-17 PROCEDURE — 87081 CULTURE SCREEN ONLY: CPT | Performed by: INTERNAL MEDICINE

## 2018-12-17 PROCEDURE — 86140 C-REACTIVE PROTEIN: CPT | Performed by: ORTHOPAEDIC SURGERY

## 2018-12-17 PROCEDURE — 80053 COMPREHEN METABOLIC PANEL: CPT | Performed by: INTERNAL MEDICINE

## 2018-12-17 PROCEDURE — 85610 PROTHROMBIN TIME: CPT | Performed by: INTERNAL MEDICINE

## 2018-12-17 PROCEDURE — 83605 ASSAY OF LACTIC ACID: CPT | Performed by: INTERNAL MEDICINE

## 2018-12-17 PROCEDURE — 85027 COMPLETE CBC AUTOMATED: CPT | Performed by: INTERNAL MEDICINE

## 2018-12-17 PROCEDURE — 86850 RBC ANTIBODY SCREEN: CPT | Performed by: INTERNAL MEDICINE

## 2018-12-17 PROCEDURE — 86901 BLOOD TYPING SEROLOGIC RH(D): CPT | Performed by: INTERNAL MEDICINE

## 2018-12-17 PROCEDURE — 83735 ASSAY OF MAGNESIUM: CPT | Performed by: INTERNAL MEDICINE

## 2018-12-17 PROCEDURE — 86920 COMPATIBILITY TEST SPIN: CPT

## 2018-12-17 PROCEDURE — 86900 BLOOD TYPING SEROLOGIC ABO: CPT | Performed by: INTERNAL MEDICINE

## 2018-12-17 RX ORDER — SODIUM CHLORIDE 9 MG/ML
INJECTION, SOLUTION INTRAVENOUS CONTINUOUS
Status: DISCONTINUED | OUTPATIENT
Start: 2018-12-17 | End: 2018-12-20

## 2018-12-17 RX ORDER — ACETAMINOPHEN 325 MG/1
650 TABLET ORAL EVERY 4 HOURS PRN
Status: DISCONTINUED | OUTPATIENT
Start: 2018-12-17 | End: 2018-12-22

## 2018-12-17 RX ORDER — ONDANSETRON 2 MG/ML
4 INJECTION INTRAMUSCULAR; INTRAVENOUS EVERY 6 HOURS PRN
Status: DISCONTINUED | OUTPATIENT
Start: 2018-12-17 | End: 2018-12-22

## 2018-12-17 RX ORDER — HEPARIN SODIUM 5000 [USP'U]/ML
5000 INJECTION, SOLUTION INTRAVENOUS; SUBCUTANEOUS ONCE
Status: COMPLETED | OUTPATIENT
Start: 2018-12-17 | End: 2018-12-17

## 2018-12-17 RX ORDER — MORPHINE SULFATE 2 MG/ML
2 INJECTION, SOLUTION INTRAMUSCULAR; INTRAVENOUS EVERY 2 HOUR PRN
Status: DISCONTINUED | OUTPATIENT
Start: 2018-12-17 | End: 2018-12-22

## 2018-12-17 RX ORDER — ACETAMINOPHEN AND CODEINE PHOSPHATE 300; 30 MG/1; MG/1
1 TABLET ORAL EVERY 4 HOURS PRN
Status: DISCONTINUED | OUTPATIENT
Start: 2018-12-17 | End: 2018-12-22

## 2018-12-17 RX ORDER — OMEPRAZOLE 20 MG/1
40 CAPSULE, DELAYED RELEASE ORAL
COMMUNITY
End: 2019-01-01

## 2018-12-17 RX ORDER — SODIUM CHLORIDE 0.9 % (FLUSH) 0.9 %
3 SYRINGE (ML) INJECTION AS NEEDED
Status: DISCONTINUED | OUTPATIENT
Start: 2018-12-17 | End: 2018-12-22

## 2018-12-17 RX ORDER — MORPHINE SULFATE 4 MG/ML
4 INJECTION, SOLUTION INTRAMUSCULAR; INTRAVENOUS EVERY 2 HOUR PRN
Status: DISCONTINUED | OUTPATIENT
Start: 2018-12-17 | End: 2018-12-22

## 2018-12-17 RX ORDER — ACETAMINOPHEN 325 MG/1
650 TABLET ORAL EVERY 6 HOURS PRN
Status: DISCONTINUED | OUTPATIENT
Start: 2018-12-17 | End: 2018-12-22

## 2018-12-17 RX ORDER — ACETAMINOPHEN AND CODEINE PHOSPHATE 300; 30 MG/1; MG/1
2 TABLET ORAL EVERY 4 HOURS PRN
Status: DISCONTINUED | OUTPATIENT
Start: 2018-12-17 | End: 2018-12-22

## 2018-12-17 RX ORDER — METOPROLOL SUCCINATE 25 MG/1
25 TABLET, EXTENDED RELEASE ORAL DAILY
Status: DISCONTINUED | OUTPATIENT
Start: 2018-12-18 | End: 2018-12-22

## 2018-12-17 RX ORDER — DOCUSATE SODIUM 100 MG/1
100 CAPSULE, LIQUID FILLED ORAL 2 TIMES DAILY
Status: DISCONTINUED | OUTPATIENT
Start: 2018-12-17 | End: 2018-12-22

## 2018-12-17 RX ORDER — ESCITALOPRAM OXALATE 10 MG/1
20 TABLET ORAL DAILY
Status: DISCONTINUED | OUTPATIENT
Start: 2018-12-18 | End: 2018-12-22

## 2018-12-17 RX ORDER — ALLOPURINOL 300 MG/1
300 TABLET ORAL DAILY
Status: DISCONTINUED | OUTPATIENT
Start: 2018-12-18 | End: 2018-12-22

## 2018-12-17 RX ORDER — PRAVASTATIN SODIUM 20 MG
20 TABLET ORAL NIGHTLY
Status: DISCONTINUED | OUTPATIENT
Start: 2018-12-17 | End: 2018-12-22

## 2018-12-17 RX ORDER — POLYETHYLENE GLYCOL 3350 17 G/17G
17 POWDER, FOR SOLUTION ORAL DAILY PRN
Status: DISCONTINUED | OUTPATIENT
Start: 2018-12-17 | End: 2018-12-22

## 2018-12-17 RX ORDER — MORPHINE SULFATE 2 MG/ML
1 INJECTION, SOLUTION INTRAMUSCULAR; INTRAVENOUS EVERY 2 HOUR PRN
Status: DISCONTINUED | OUTPATIENT
Start: 2018-12-17 | End: 2018-12-22

## 2018-12-17 RX ORDER — BISACODYL 10 MG
10 SUPPOSITORY, RECTAL RECTAL
Status: DISCONTINUED | OUTPATIENT
Start: 2018-12-17 | End: 2018-12-22

## 2018-12-17 RX ORDER — METOCLOPRAMIDE HYDROCHLORIDE 5 MG/ML
10 INJECTION INTRAMUSCULAR; INTRAVENOUS EVERY 8 HOURS PRN
Status: DISCONTINUED | OUTPATIENT
Start: 2018-12-17 | End: 2018-12-21

## 2018-12-17 RX ORDER — OXYCODONE AND ACETAMINOPHEN 10; 325 MG/1; MG/1
1 TABLET ORAL EVERY 4 HOURS PRN
Status: ON HOLD | COMMUNITY
End: 2018-12-22

## 2018-12-17 RX ORDER — QUETIAPINE 25 MG/1
100 TABLET, FILM COATED ORAL NIGHTLY
Status: DISCONTINUED | OUTPATIENT
Start: 2018-12-17 | End: 2018-12-22

## 2018-12-17 RX ORDER — SODIUM PHOSPHATE, DIBASIC AND SODIUM PHOSPHATE, MONOBASIC 7; 19 G/133ML; G/133ML
1 ENEMA RECTAL ONCE AS NEEDED
Status: DISCONTINUED | OUTPATIENT
Start: 2018-12-17 | End: 2018-12-22

## 2018-12-17 NOTE — H&P
[facility] Hospitalist H&P     CC: OSH transfer for Right Hip Infection     PCP: Jenny Hughes MD    Admission Date: 12/17/2018    ASSESSMENT / PLAN:   Mr. Amadeo Broussard is a 68 yo male with PMH sig for bladder ca s/p chemo, BPH, CKD 3, CAD, depression, HTN, HLD, nightly    #Depression:  -escitalopram 20 mg daily    FN:  - IVF: NS 75  - Diet: NPO     DVT Prophy: hold until plan for OR finalized  Lines: PIV    Dispo: pending clinical course    Outpatient records or previous hospital records reviewed.      Further rec Hyperuricemia    • Insomnia    • Ischemic cardiomyopathy 2016    Last ECHO 8/17, EF > 50%, concentric LVH   • ARRIOLA (nonalcoholic steatohepatitis) 2016   • Obesity, Class III, BMI 40-49.9 (morbid obesity) ([address]    • Osteoarthritis    • Peripheral vascular d Current Every Day Smoker        Packs/day: 0.50        Years: 20.00        Pack years: 10        Types: Cigarettes        Quit date: 2014        Years since quittin.9      Smokeless tobacco: Former User    Alcohol use:  Yes      Alcohol/week: 0.0 oz

## 2018-12-18 ENCOUNTER — ANESTHESIA (OUTPATIENT)
Dept: SURGERY | Facility: HOSPITAL | Age: 78
DRG: 467 | End: 2018-12-18
Payer: MEDICARE

## 2018-12-18 ENCOUNTER — APPOINTMENT (OUTPATIENT)
Dept: GENERAL RADIOLOGY | Facility: HOSPITAL | Age: 78
DRG: 467 | End: 2018-12-18
Attending: ORTHOPAEDIC SURGERY
Payer: MEDICARE

## 2018-12-18 ENCOUNTER — ANESTHESIA EVENT (OUTPATIENT)
Dept: SURGERY | Facility: HOSPITAL | Age: 78
DRG: 467 | End: 2018-12-18
Payer: MEDICARE

## 2018-12-18 ENCOUNTER — APPOINTMENT (OUTPATIENT)
Dept: PICC SERVICES | Facility: HOSPITAL | Age: 78
DRG: 467 | End: 2018-12-18
Attending: INTERNAL MEDICINE
Payer: MEDICARE

## 2018-12-18 PROCEDURE — 87077 CULTURE AEROBIC IDENTIFY: CPT | Performed by: ORTHOPAEDIC SURGERY

## 2018-12-18 PROCEDURE — 81003 URINALYSIS AUTO W/O SCOPE: CPT | Performed by: INTERNAL MEDICINE

## 2018-12-18 PROCEDURE — 0SP909Z REMOVAL OF LINER FROM RIGHT HIP JOINT, OPEN APPROACH: ICD-10-PCS | Performed by: ORTHOPAEDIC SURGERY

## 2018-12-18 PROCEDURE — 3E0U029 INTRODUCTION OF OTHER ANTI-INFECTIVE INTO JOINTS, OPEN APPROACH: ICD-10-PCS | Performed by: ORTHOPAEDIC SURGERY

## 2018-12-18 PROCEDURE — 87176 TISSUE HOMOGENIZATION CULTR: CPT | Performed by: ORTHOPAEDIC SURGERY

## 2018-12-18 PROCEDURE — 87075 CULTR BACTERIA EXCEPT BLOOD: CPT | Performed by: ORTHOPAEDIC SURGERY

## 2018-12-18 PROCEDURE — 83735 ASSAY OF MAGNESIUM: CPT | Performed by: INTERNAL MEDICINE

## 2018-12-18 PROCEDURE — 88300 SURGICAL PATH GROSS: CPT | Performed by: ORTHOPAEDIC SURGERY

## 2018-12-18 PROCEDURE — 87040 BLOOD CULTURE FOR BACTERIA: CPT | Performed by: INTERNAL MEDICINE

## 2018-12-18 PROCEDURE — 36569 INSJ PICC 5 YR+ W/O IMAGING: CPT

## 2018-12-18 PROCEDURE — 0SRR0JZ REPLACEMENT OF RIGHT HIP JOINT, FEMORAL SURFACE WITH SYNTHETIC SUBSTITUTE, OPEN APPROACH: ICD-10-PCS | Performed by: ORTHOPAEDIC SURGERY

## 2018-12-18 PROCEDURE — 76937 US GUIDE VASCULAR ACCESS: CPT

## 2018-12-18 PROCEDURE — 87205 SMEAR GRAM STAIN: CPT | Performed by: ORTHOPAEDIC SURGERY

## 2018-12-18 PROCEDURE — 87070 CULTURE OTHR SPECIMN AEROBIC: CPT | Performed by: ORTHOPAEDIC SURGERY

## 2018-12-18 PROCEDURE — A4216 STERILE WATER/SALINE, 10 ML: HCPCS | Performed by: INTERNAL MEDICINE

## 2018-12-18 PROCEDURE — 85018 HEMOGLOBIN: CPT | Performed by: INTERNAL MEDICINE

## 2018-12-18 PROCEDURE — 85027 COMPLETE CBC AUTOMATED: CPT | Performed by: INTERNAL MEDICINE

## 2018-12-18 PROCEDURE — 0SPR0JZ REMOVAL OF SYNTHETIC SUBSTITUTE FROM RIGHT HIP JOINT, FEMORAL SURFACE, OPEN APPROACH: ICD-10-PCS | Performed by: ORTHOPAEDIC SURGERY

## 2018-12-18 PROCEDURE — 93010 ELECTROCARDIOGRAM REPORT: CPT | Performed by: INTERNAL MEDICINE

## 2018-12-18 PROCEDURE — 86140 C-REACTIVE PROTEIN: CPT | Performed by: INTERNAL MEDICINE

## 2018-12-18 PROCEDURE — 85610 PROTHROMBIN TIME: CPT | Performed by: INTERNAL MEDICINE

## 2018-12-18 PROCEDURE — 93005 ELECTROCARDIOGRAM TRACING: CPT

## 2018-12-18 PROCEDURE — 87186 SC STD MICRODIL/AGAR DIL: CPT | Performed by: ORTHOPAEDIC SURGERY

## 2018-12-18 PROCEDURE — 02HV33Z INSERTION OF INFUSION DEVICE INTO SUPERIOR VENA CAVA, PERCUTANEOUS APPROACH: ICD-10-PCS | Performed by: INTERNAL MEDICINE

## 2018-12-18 PROCEDURE — 73501 X-RAY EXAM HIP UNI 1 VIEW: CPT | Performed by: ORTHOPAEDIC SURGERY

## 2018-12-18 PROCEDURE — 0SUA09Z SUPPLEMENT RIGHT HIP JOINT, ACETABULAR SURFACE WITH LINER, OPEN APPROACH: ICD-10-PCS | Performed by: ORTHOPAEDIC SURGERY

## 2018-12-18 PROCEDURE — 80048 BASIC METABOLIC PNL TOTAL CA: CPT | Performed by: INTERNAL MEDICINE

## 2018-12-18 PROCEDURE — 30233N1 TRANSFUSION OF NONAUTOLOGOUS RED BLOOD CELLS INTO PERIPHERAL VEIN, PERCUTANEOUS APPROACH: ICD-10-PCS | Performed by: INTERNAL MEDICINE

## 2018-12-18 PROCEDURE — A4216 STERILE WATER/SALINE, 10 ML: HCPCS

## 2018-12-18 RX ORDER — VANCOMYCIN HYDROCHLORIDE 1 G/20ML
INJECTION, POWDER, LYOPHILIZED, FOR SOLUTION INTRAVENOUS AS NEEDED
Status: DISCONTINUED | OUTPATIENT
Start: 2018-12-18 | End: 2018-12-18 | Stop reason: HOSPADM

## 2018-12-18 RX ORDER — MORPHINE SULFATE 4 MG/ML
0.5 INJECTION, SOLUTION INTRAMUSCULAR; INTRAVENOUS
Status: COMPLETED | OUTPATIENT
Start: 2018-12-18 | End: 2018-12-18

## 2018-12-18 RX ORDER — ASPIRIN 325 MG
325 TABLET, DELAYED RELEASE (ENTERIC COATED) ORAL 2 TIMES DAILY
Status: DISCONTINUED | OUTPATIENT
Start: 2018-12-18 | End: 2018-12-22

## 2018-12-18 RX ORDER — FUROSEMIDE 10 MG/ML
20 INJECTION INTRAMUSCULAR; INTRAVENOUS ONCE
Status: COMPLETED | OUTPATIENT
Start: 2018-12-18 | End: 2018-12-18

## 2018-12-18 RX ORDER — SODIUM CHLORIDE 0.9 % (FLUSH) 0.9 %
10 SYRINGE (ML) INJECTION AS NEEDED
Status: DISCONTINUED | OUTPATIENT
Start: 2018-12-18 | End: 2018-12-22

## 2018-12-18 RX ORDER — HYDROMORPHONE HYDROCHLORIDE 1 MG/ML
0.5 INJECTION, SOLUTION INTRAMUSCULAR; INTRAVENOUS; SUBCUTANEOUS EVERY 5 MIN PRN
Status: DISCONTINUED | OUTPATIENT
Start: 2018-12-18 | End: 2018-12-22

## 2018-12-18 RX ORDER — LIDOCAINE HYDROCHLORIDE 10 MG/ML
0.5 INJECTION, SOLUTION INFILTRATION; PERINEURAL ONCE AS NEEDED
Status: ACTIVE | OUTPATIENT
Start: 2018-12-18 | End: 2018-12-18

## 2018-12-18 RX ORDER — NEOSTIGMINE METHYLSULFATE 0.5 MG/ML
INJECTION INTRAVENOUS AS NEEDED
Status: DISCONTINUED | OUTPATIENT
Start: 2018-12-18 | End: 2018-12-18 | Stop reason: SURG

## 2018-12-18 RX ORDER — SODIUM CHLORIDE, SODIUM LACTATE, POTASSIUM CHLORIDE, CALCIUM CHLORIDE 600; 310; 30; 20 MG/100ML; MG/100ML; MG/100ML; MG/100ML
INJECTION, SOLUTION INTRAVENOUS CONTINUOUS
Status: DISCONTINUED | OUTPATIENT
Start: 2018-12-18 | End: 2018-12-18 | Stop reason: HOSPADM

## 2018-12-18 RX ORDER — HYDROCODONE BITARTRATE AND ACETAMINOPHEN 5; 325 MG/1; MG/1
2 TABLET ORAL AS NEEDED
Status: DISCONTINUED | OUTPATIENT
Start: 2018-12-18 | End: 2018-12-18 | Stop reason: HOSPADM

## 2018-12-18 RX ORDER — HALOPERIDOL 5 MG/ML
0.25 INJECTION INTRAMUSCULAR ONCE AS NEEDED
Status: DISCONTINUED | OUTPATIENT
Start: 2018-12-18 | End: 2018-12-18 | Stop reason: HOSPADM

## 2018-12-18 RX ORDER — PHENYLEPHRINE HCL 10 MG/ML
VIAL (ML) INJECTION AS NEEDED
Status: DISCONTINUED | OUTPATIENT
Start: 2018-12-18 | End: 2018-12-18 | Stop reason: SURG

## 2018-12-18 RX ORDER — HYDROMORPHONE HYDROCHLORIDE 1 MG/ML
0.5 INJECTION, SOLUTION INTRAMUSCULAR; INTRAVENOUS; SUBCUTANEOUS EVERY 2 HOUR PRN
Status: DISCONTINUED | OUTPATIENT
Start: 2018-12-18 | End: 2018-12-22

## 2018-12-18 RX ORDER — GLYCOPYRROLATE 0.2 MG/ML
INJECTION INTRAMUSCULAR; INTRAVENOUS AS NEEDED
Status: DISCONTINUED | OUTPATIENT
Start: 2018-12-18 | End: 2018-12-18 | Stop reason: SURG

## 2018-12-18 RX ORDER — LIDOCAINE HYDROCHLORIDE 10 MG/ML
INJECTION, SOLUTION EPIDURAL; INFILTRATION; INTRACAUDAL; PERINEURAL AS NEEDED
Status: DISCONTINUED | OUTPATIENT
Start: 2018-12-18 | End: 2018-12-18 | Stop reason: SURG

## 2018-12-18 RX ORDER — HYDROMORPHONE HYDROCHLORIDE 1 MG/ML
2 INJECTION, SOLUTION INTRAMUSCULAR; INTRAVENOUS; SUBCUTANEOUS EVERY 2 HOUR PRN
Status: DISCONTINUED | OUTPATIENT
Start: 2018-12-18 | End: 2018-12-22

## 2018-12-18 RX ORDER — ONDANSETRON 2 MG/ML
INJECTION INTRAMUSCULAR; INTRAVENOUS AS NEEDED
Status: DISCONTINUED | OUTPATIENT
Start: 2018-12-18 | End: 2018-12-18 | Stop reason: SURG

## 2018-12-18 RX ORDER — MORPHINE SULFATE 4 MG/ML
8 INJECTION, SOLUTION INTRAMUSCULAR; INTRAVENOUS
Status: COMPLETED | OUTPATIENT
Start: 2018-12-18 | End: 2018-12-18

## 2018-12-18 RX ORDER — MORPHINE SULFATE 10 MG/ML
12 INJECTION, SOLUTION INTRAMUSCULAR; INTRAVENOUS
Status: COMPLETED | OUTPATIENT
Start: 2018-12-18 | End: 2018-12-18

## 2018-12-18 RX ORDER — SODIUM CHLORIDE 9 MG/ML
INJECTION, SOLUTION INTRAVENOUS
Status: COMPLETED
Start: 2018-12-18 | End: 2018-12-18

## 2018-12-18 RX ORDER — ONDANSETRON 2 MG/ML
4 INJECTION INTRAMUSCULAR; INTRAVENOUS ONCE AS NEEDED
Status: DISCONTINUED | OUTPATIENT
Start: 2018-12-18 | End: 2018-12-18 | Stop reason: HOSPADM

## 2018-12-18 RX ORDER — NALOXONE HYDROCHLORIDE 0.4 MG/ML
80 INJECTION, SOLUTION INTRAMUSCULAR; INTRAVENOUS; SUBCUTANEOUS AS NEEDED
Status: DISCONTINUED | OUTPATIENT
Start: 2018-12-18 | End: 2018-12-18 | Stop reason: HOSPADM

## 2018-12-18 RX ORDER — SODIUM CHLORIDE, SODIUM LACTATE, POTASSIUM CHLORIDE, CALCIUM CHLORIDE 600; 310; 30; 20 MG/100ML; MG/100ML; MG/100ML; MG/100ML
INJECTION, SOLUTION INTRAVENOUS CONTINUOUS PRN
Status: DISCONTINUED | OUTPATIENT
Start: 2018-12-18 | End: 2018-12-18 | Stop reason: SURG

## 2018-12-18 RX ORDER — METOPROLOL TARTRATE 5 MG/5ML
2.5 INJECTION INTRAVENOUS ONCE
Status: DISCONTINUED | OUTPATIENT
Start: 2018-12-18 | End: 2018-12-18 | Stop reason: HOSPADM

## 2018-12-18 RX ORDER — HYDROCODONE BITARTRATE AND ACETAMINOPHEN 5; 325 MG/1; MG/1
1 TABLET ORAL AS NEEDED
Status: DISCONTINUED | OUTPATIENT
Start: 2018-12-18 | End: 2018-12-18 | Stop reason: HOSPADM

## 2018-12-18 RX ORDER — SODIUM CHLORIDE 9 MG/ML
INJECTION, SOLUTION INTRAVENOUS ONCE
Status: COMPLETED | OUTPATIENT
Start: 2018-12-18 | End: 2018-12-18

## 2018-12-18 RX ORDER — ROCURONIUM BROMIDE 10 MG/ML
INJECTION, SOLUTION INTRAVENOUS AS NEEDED
Status: DISCONTINUED | OUTPATIENT
Start: 2018-12-18 | End: 2018-12-18 | Stop reason: SURG

## 2018-12-18 RX ORDER — TOBRAMYCIN 1.2 G/30ML
INJECTION, POWDER, LYOPHILIZED, FOR SOLUTION INTRAVENOUS AS NEEDED
Status: DISCONTINUED | OUTPATIENT
Start: 2018-12-18 | End: 2018-12-18 | Stop reason: HOSPADM

## 2018-12-18 RX ADMIN — ROCURONIUM BROMIDE 35 MG: 10 INJECTION, SOLUTION INTRAVENOUS at 18:36:00

## 2018-12-18 RX ADMIN — NEOSTIGMINE METHYLSULFATE 5 MG: 0.5 INJECTION INTRAVENOUS at 19:55:00

## 2018-12-18 RX ADMIN — PHENYLEPHRINE HCL 200 MCG: 10 MG/ML VIAL (ML) INJECTION at 19:35:00

## 2018-12-18 RX ADMIN — SODIUM CHLORIDE, SODIUM LACTATE, POTASSIUM CHLORIDE, CALCIUM CHLORIDE: 600; 310; 30; 20 INJECTION, SOLUTION INTRAVENOUS at 18:28:00

## 2018-12-18 RX ADMIN — ONDANSETRON 4 MG: 2 INJECTION INTRAMUSCULAR; INTRAVENOUS at 18:36:00

## 2018-12-18 RX ADMIN — GLYCOPYRROLATE 0.6 MG: 0.2 INJECTION INTRAMUSCULAR; INTRAVENOUS at 19:55:00

## 2018-12-18 RX ADMIN — PHENYLEPHRINE HCL 200 MCG: 10 MG/ML VIAL (ML) INJECTION at 19:10:00

## 2018-12-18 RX ADMIN — ROCURONIUM BROMIDE 15 MG: 10 INJECTION, SOLUTION INTRAVENOUS at 19:05:00

## 2018-12-18 RX ADMIN — LIDOCAINE HYDROCHLORIDE 50 MG: 10 INJECTION, SOLUTION EPIDURAL; INFILTRATION; INTRACAUDAL; PERINEURAL at 18:36:00

## 2018-12-18 NOTE — CONSULTS
[place] Patient Status:  Inpatient    1940 MRN [id number]   Location [facility] 4W/SW/SE Attending Carrie Guillory MD   Hosp Day # 1 PCP Chery Grace MD     Reason for Consultat CPAP     Past Surgical History:   Procedure Laterality Date   • CAROTID ENDARTERECTOMY Bilateral 2010   • HIP TOTAL ARTHROPLASTY REVISION Right 12/6/2018    Performed by Bro Jonas DO at [facility] OR   • [address]      fusion powder packet 17 g, 17 g, Oral, Daily PRN  •  magnesium hydroxide (MILK OF MAGNESIA) 400 MG/5ML suspension 30 mL, 30 mL, Oral, Daily PRN  •  bisacodyl (DULCOLAX) rectal suppository 10 mg, 10 mg, Rectal, Daily PRN  •  FLEET ENEMA (FLEET) 7-19 GM/118ML enema myalgias, arthralgias, muscle weakness. Neurological: Negative for headaches, dizziness, seizures, memory problems, trouble swallowing, speech problems, gait problems and weakness. Behavioral/Psych: Negative for active tobacco use.   Endocrine: No history 12/17/2018    ALT 24 12/17/2018    INR 1.3 12/18/2018    ESRML >145 12/17/2018    CRP 26.8 12/18/2018    MG 1.9 12/18/2018       Radiology:  XR   CONCLUSION:   1. Right hip prosthesis in anatomic position.  Significant cortical lucency and bony resorptive c if not already done,     2. PVD: with healing scab on the foot, no obvious infection noted,     3. MRSA carriage swab was negative at the time of first surgery, but will keep IV Vancomycin for now sec to recent surgery, hospitalization,     4.    CRP elev

## 2018-12-18 NOTE — PROGRESS NOTES
[facility] Hospitalist Progress Note     Reason for Admission: right hip infection  PCP: Smita Still MD     Assessment/Plan:     Active Problems:    Joint infection [facility])    Mr. John Medina is a 70 yo male with PMH sig for bladder ca s/p chemo, BPH, CKD 3, CAD, d/c     #Obesity- BMI 36  -o/p weight loss endeavors     #Insomnia  -seroquel started per psych last admission with delerium/AMS  -continue 100 mg nightly     #Depression:  -escitalopram 20 mg daily     FN:  - IVF: NS 75, d/c when diet resumed  - Diet: NPO 209  217         Recent Labs   Lab  12/17/18   1725  12/18/18   0754   GLU  137*  117*   BUN  45*  42*   CREATSERUM  1.52*  1.49   GFRAA  50*  52*   GFRNAA  44*  45*   CA  8.8  9.1   NA  133*  136   K  4.4  3.8   CL  108  107   CO2  18*  20*       Recent ondansetron HCl, Metoclopramide HCl, PEG 3350, magnesium hydroxide, bisacodyl, FLEET ENEMA

## 2018-12-18 NOTE — CM/SW NOTE
SW followed up with pt's son Melva Goins re:rehab placement. He does not want his dad going back to [facility]. He would like the referral to be sent to [facility]. Referral sent, DON screen requested. Myron # [id number].      CRESCENCIO Alvares

## 2018-12-18 NOTE — PLAN OF CARE
SKIN/TISSUE INTEGRITY - ADULT    • Incision(s), wounds(s) or drain site(s) healing without S/S of infection Not Progressing          CARDIOVASCULAR - ADULT    • Maintains optimal cardiac output and hemodynamic stability Progressing        NEUROLOGICAL - AD

## 2018-12-18 NOTE — PLAN OF CARE
IV ACCESS INFILTRATED, SEVERAL ATTEMPTS TO REINSERT IV BY SEVERAL NURSES, UNABLE TO REINSERT. PATIENT TRANSPORTED TO [facility], ENDORSED TO FLOOR RN THAT IV ACCESS HAS BEEN INFILTRATED AND CRN AWARE, WILL TRY TO HAVE AN ER RN TO TRY TO PLACE AN IV SITE.  FLOOR RN

## 2018-12-18 NOTE — PROGRESS NOTES
[facility] Pharmacy Note: Antimicrobial Weight Dose Adjustment for: piperacillin/tazobactam (Harry Luis)    Chelsi Austin is a 69year old male who has been prescribed piperacillin/tazobactam (ZOSYN) 3.375 g every 8 hours.   CrCl is estimated creatinine clearance is

## 2018-12-18 NOTE — RESPIRATORY THERAPY NOTE
SAM ASSESSMENT:    Pt does not have a previous diagnosis of SAM. Pt does not routinely use a CPAP device at home. This pt is not suspected to be at high risk for SAM and sleep lab packet was not provided to patient for outpatient follow-up. Patient refused

## 2018-12-18 NOTE — PROGRESS NOTES
[facility] dosing service    Initial Pharmacokinetic Consult for Vancomycin Dosing     Smiley Dailey is a 69year old male admitted on 12/17 who is being treated for possible joint infection/cellulitis .   Pharmacy has been asked to dose Vancomycin by IVPB (25mg/kg, capped at 2g) x 1 dose today at [address] [place] [address] on 12/17 upon admission, we will followed with a Vancomycin trough on 12/18.   If level is between 15-20, we will continue with 1.25 gm Q 24 hours  based upon, adjusted body weight, renal function, and phar

## 2018-12-18 NOTE — CONSULTS
[facility] HOSP - [facility]    Report of Consultation    Michael Alcantar Patient Status:  Inpatient    1940 MRN [id number]   Location [facility] 1W Attending Eric Lee MD   Hosp Day # 0 PCP Mariel Tuttle MD     Date of Admission:  1 cardiomyopathy 2016    Last ECHO 8/17, EF > 50%, concentric LVH   • ARRIOLA (nonalcoholic steatohepatitis) 2016   • Obesity, Class III, BMI 40-49.9 (morbid obesity) ([facility]    • Osteoarthritis    • Peripheral vascular disease ([facility] 2016   • Personal history of ondansetron HCl (ZOFRAN) injection 4 mg 4 mg Intravenous Q6H PRN   Metoclopramide HCl (REGLAN) injection 10 mg 10 mg Intravenous Q8H PRN   docusate sodium (COLACE) cap 100 mg 100 mg Oral BID   PEG 3350 (MIRALAX) powder packet 17 g 17 g Oral Daily PRN   m docusate sodium 100 MG Oral Cap Take 100 mg by mouth 2 (two) times daily. Clopidogrel Bisulfate 75 MG Oral Tab Take 1 tablet (75 mg total) by mouth daily. escitalopram 20 MG Oral Tab Take 1 tablet (20 mg total) by mouth daily.    allopurinol 300 MG Or about the hip, the hip is stable throughout gentle passive range of motion. He has no foot drop distally and has warm pink toes with palpable pulses.     Results:     Laboratory Data:  Lab Results   Component Value Date    WBC 7.3 12/17/2018    HGB 7.7 (L) postoperatively with a PICC line. We discussed the recovery and rehab in detail. We discussed the likely need to return to the rehab facility. Patient be n.p.o. after midnight. Check his ESR and CRP to get a baseline value.   I am checking a new x-ray

## 2018-12-18 NOTE — PLAN OF CARE
PATIENT'S DAUGHTER IS CONCERN ABOUT ANESTHESIA, ACCORDING TO HER PATIENT HAD A HARD TIME WAKING UP FROM ANESTHESIA ON LAST SURGERY AND HE BECAME CONFUSED.  DAUGHTER WOULD LIKE TO SPEAK TO ANESTHESIOLOGIST ABOUT IT. ENDORSED TO TAJ SIERRA IN 4W REGARDING SUCH

## 2018-12-18 NOTE — PROGRESS NOTES
[facility]    CDI Prediction Tool Protocol (Vancomycin Initiated)    OVP (oral vancomycin prophylaxis) 125 mg PO Daily is being started in this patient based on a score of 13.       Score Breakdown:  High risk antibiotic use (5 points)  Malignanc

## 2018-12-18 NOTE — OCCUPATIONAL THERAPY NOTE
OT eval deferred at this time. Per RN Jovanny Mercer) pt is scheduled for surgery later today.  Will require new orders when appropriate

## 2018-12-18 NOTE — PLAN OF CARE
CARDIOVASCULAR - ADULT    • Maintains optimal cardiac output and hemodynamic stability Progressing        NEUROLOGICAL - ADULT    • Achieves stable or improved neurological status Progressing        PAIN - ADULT    • Verbalizes/displays adequate comfort le

## 2018-12-19 PROCEDURE — 80048 BASIC METABOLIC PNL TOTAL CA: CPT | Performed by: ORTHOPAEDIC SURGERY

## 2018-12-19 PROCEDURE — 80202 ASSAY OF VANCOMYCIN: CPT | Performed by: INTERNAL MEDICINE

## 2018-12-19 PROCEDURE — A4216 STERILE WATER/SALINE, 10 ML: HCPCS | Performed by: INTERNAL MEDICINE

## 2018-12-19 PROCEDURE — 85025 COMPLETE CBC W/AUTO DIFF WBC: CPT | Performed by: ORTHOPAEDIC SURGERY

## 2018-12-19 RX ORDER — SODIUM CHLORIDE 9 MG/ML
INJECTION, SOLUTION INTRAVENOUS
Status: DISPENSED
Start: 2018-12-19 | End: 2018-12-19

## 2018-12-19 NOTE — PROGRESS NOTES
[facility] AND [facility] Infectious Disease  Progress Note    Cas Killian Patient Status:  Inpatient    1940 MRN [id number]   Location [facility] 4W/SW/SE Attending Zhao Forrest DO   Hosp Day # 2 PCP Gianni Fuchs MD     Subjective: the subjacent soft tissues. Antibiotics Reviewed:  Vancomycin IV  Cefepime IV  Vancomycin p.o. Assessment and Plan:    1.   Complicated R JOHN site infection with erythema and draiange  - Cultures from Adri Richardson  pending  - OR cultures negat

## 2018-12-19 NOTE — ANESTHESIA PREPROCEDURE EVALUATION
Anesthesia PreOp Note    HPI:     Latrice Llamas is a 69year old male who presents for preoperative consultation requested by: Prateek Cruz DO    Date of Surgery: 12/17/2018 - 12/18/2018    Procedure(s):  HIP TOTAL ARTHROPLASTY REVISION  Indication: • BPH (benign prostatic hyperplasia)    • CKD (chronic kidney disease) stage 3, GFR 30-59 ml/min ([facility])    • Coronary atherosclerosis    • DDD (degenerative disc disease), lumbar    • Depression    • Eczema    • High blood pressure    • High cholesterol docusate sodium 100 MG Oral Cap Take 100 mg by mouth 2 (two) times daily. Disp:  Rfl: 0 12/17/2018 at Unknown time   Clopidogrel Bisulfate 75 MG Oral Tab Take 1 tablet (75 mg total) by mouth daily.  Disp: 90 tablet Rfl: 3 12/17/2018 at Unknown time   sai Rocuronium Bromide (ZEMURON) injection  Intravenous PRN Zhao Ovalles MD 35 mg at 12/18/18 1836    ondansetron HCl (ZOFRAN) injection  Intravenous PRN Zhao Ovalles MD 4 mg at 12/18/18 1836    lactated ringers infusion  Intravenous Continuous [MAR Hold] morphINE sulfate (PF) 4 MG/ML injection 4 mg 4 mg Intravenous Q2H PRN Camden Valentin MD 4 mg at 12/17/18 1642    [MAR Hold] acetaminophen (TYLENOL) tab 650 mg 650 mg Oral Q4H PRN Camden Valentin MD     Or         Flaco Courtney Hold] Acetaminophen-Codeine #3 ( 0.9%  NaCl infusion  Intravenous Continuous Keysha Jorgensen MD Last Rate: 75 mL/hr at 12/18/18 4106    clonidine/epinephrine/ropivacaine/ketorolac (CERTS) pain cocktail  Intra-articular Once (Intra-Op) Elias Gomez, DO       No current Epic-ordered outpati  12/18/2018    CO2 20 (L) 12/18/2018    BUN 42 (H) 12/18/2018    CREATSERUM 1.49 12/18/2018     (H) 12/18/2018    CA 9.1 12/18/2018     Lab Results   Component Value Date    INR 1.3 (H) 12/18/2018       Vital Signs:   Body mass index is 36.41 I have informed Tressa Wall of the nature of the anesthetic plan, benefits, risks including possible dental damage if relevant, major complications, and any alternative forms of anesthetic management.    All of the patient's questions were answered to

## 2018-12-19 NOTE — PAYOR COMM NOTE
--------------  ADMISSION REVIEW     Payor: [employer] CLEM[employer] PPO  Subscriber #:  [id number]  Authorization Number: [id number]    Admit date: 12/17/18  Admit time: 0         [place] H&P   Admission Date: 12/17/2018    ASSESSMENT / PLAN:    Prophy: hold until plan for OR finalized  Lines: PIV    Dispo: pending clinical course    HPI       History of Present Illness:   Mr. Dianelsy Edge is a 70 yo male with PMH sig for bladder ca s/p chemo, BPH, CKD 3, CAD, HTN, HLD, SAM on cpap, PVD, chronic low oriana Elias HOYT DO at [facility] MAIN OR   • SPINE SURGERY PROCEDURE UNLISTED      fusion   • TOTAL HIP REPLACEMENT Right 98, 99   • TOTAL HIP REPLACEMENT Left 98, 2014        ALL:  No Known Allergies     OBJECTIVE:  /55 (BP Location: Right arm)   Pulse 104   Tem placed 12/18     #Essential hypertension-   -continue BB, hold arb as pt is normotensive     #Coronary artery disease  -hold ASA, plavix until after OR plans completed     #Ischemic cardiomyopathy EF 50%  -monitor fluid status closely  -continue BB, holdin 12/17/18   1725  12/18/18   0754   RBC  2.48*  2.45*   HGB  7.7*  7.5*   HCT  22.8*  22.6*   MCV  91.7  92.5   MCH  30.9  30.5   MCHC  33.7  33.0   RDW  15.6*  15.8*   WBC  7.3  5.9   PLT  209  217               Recent Labs   Lab  12/17/18   1725  12/18/18 **OR** morphINE sulfate **OR** morphINE sulfate, acetaminophen **OR** Acetaminophen-Codeine #3 **OR** Acetaminophen-Codeine #3, ondansetron HCl, Metoclopramide HCl, PEG 3350, magnesium hydroxide, bisacodyl, FLEET ENEMA                                12/19/ RIGHT TOTAL HIP ARTHROPLASTY WITH ANTIBIOTIC BEADS   LOS: 2 days      Will follow operative cultures  ID following and managing antibiotics  DC Drain POD 2 Ecotrin  WBAT  PT/OT  Mobilize  Hip precautions  Ecotrin for DVT prophylaxis in addition to SUMMER hose Oral Daily PRN   magnesium hydroxide (MILK OF MAGNESIA) 400 MG/5ML suspension 30 mL 30 mL Oral Daily PRN   bisacodyl (DULCOLAX) rectal suppository 10 mg 10 mg Rectal Daily PRN   FLEET ENEMA (FLEET) 7-19 GM/118ML enema 133 mL 1 enema Rectal Once PRN   allop

## 2018-12-19 NOTE — PROGRESS NOTES
[address] Patient Status:  Inpatient    1940 MRN [id number]   Location [facility] 4W/SW/SE Attending Kenyetta Richardson MD   Hosp Day # 2 PCP Monica Argueta MD     Subjective:  Procedure(s):  [address] ANTIBIOTIC BEADS   LOS: 2 days     Will follow operative cultures  ID following and managing antibiotics  DC Drain POD 2 Ecotrin  WBAT  PT/OT  Mobilize  Hip precautions  Ecotrin for DVT prophylaxis in addition to SUMMER hose / WESLEYs  DC planning -likely rehab Daily PRN   magnesium hydroxide (MILK OF MAGNESIA) 400 MG/5ML suspension 30 mL 30 mL Oral Daily PRN   bisacodyl (DULCOLAX) rectal suppository 10 mg 10 mg Rectal Daily PRN   FLEET ENEMA (FLEET) 7-19 GM/118ML enema 133 mL 1 enema Rectal Once PRN   allopurino

## 2018-12-19 NOTE — ANESTHESIA PROCEDURE NOTES
ANESTHESIA INTUBATION  Date/Time: 12/18/2018 6:39 PM  Urgency: elective    Airway not difficult    General Information and Staff    Patient location during procedure: OR  Anesthesiologist: Johan Etienne MD  Performed: anesthesiologist     Indication

## 2018-12-19 NOTE — PLAN OF CARE
NEUROLOGICAL - ADULT    • Achieves stable or improved neurological status Not Progressing          CARDIOVASCULAR - ADULT    • Maintains optimal cardiac output and hemodynamic stability Progressing        PAIN - ADULT    • Verbalizes/displays adequate comf

## 2018-12-19 NOTE — OPERATIVE REPORT
Operative Report      Arleen Jason Patient Status:  Inpatient    1940 MRN [id number]   [place] [address] Attending Valentín Contreras MD   Date of Procedure  2018 PCP Adrian Solis MD       Patient Name: Torri Swift drainage. He was taken to [facility] and was subsequently transferred here. On exam he had erythema and purulent drainage from his hip. ESR and CRP were extremely elevated.   Treatment options were discussed and it was decided bring him for will be weightbearing as tolerated. Antibiotics will be dosed by infectious disease. He will be on Ecotrin for DVT prophylaxis. His PICC line has been placed. Disposition will depend on antibiotic needs.     Rashad Lora DO

## 2018-12-19 NOTE — ANESTHESIA POSTPROCEDURE EVALUATION
Patient: Contreras Chung    Procedure Summary     Date:  12/18/18 Room / Location:  [address] [place] [address] MAIN OR 06 / [address] [place] [address] MAIN OR    Anesthesia Start:  1919 Anesthesia Stop:  2024    Procedure:  HIP TOTAL ARTHROPLASTY REVISION (Right ) Diagnosis:       Infected prosthesis

## 2018-12-20 PROBLEM — T84.51XA INFECTED PROSTHESIS OF RIGHT HIP (HCC): Status: ACTIVE | Noted: 2018-12-17

## 2018-12-20 PROCEDURE — A4216 STERILE WATER/SALINE, 10 ML: HCPCS | Performed by: INTERNAL MEDICINE

## 2018-12-20 PROCEDURE — 97110 THERAPEUTIC EXERCISES: CPT

## 2018-12-20 PROCEDURE — 97116 GAIT TRAINING THERAPY: CPT

## 2018-12-20 PROCEDURE — 97166 OT EVAL MOD COMPLEX 45 MIN: CPT

## 2018-12-20 PROCEDURE — 97162 PT EVAL MOD COMPLEX 30 MIN: CPT

## 2018-12-20 PROCEDURE — 80048 BASIC METABOLIC PNL TOTAL CA: CPT | Performed by: HOSPITALIST

## 2018-12-20 PROCEDURE — 85018 HEMOGLOBIN: CPT | Performed by: ORTHOPAEDIC SURGERY

## 2018-12-20 PROCEDURE — 85025 COMPLETE CBC W/AUTO DIFF WBC: CPT | Performed by: HOSPITALIST

## 2018-12-20 RX ORDER — SODIUM CHLORIDE 0.9 % (FLUSH) 0.9 %
10 SYRINGE (ML) INJECTION AS NEEDED
Status: DISCONTINUED | OUTPATIENT
Start: 2018-12-20 | End: 2018-12-22

## 2018-12-20 RX ORDER — MELATONIN
325 2 TIMES DAILY WITH MEALS
Status: DISCONTINUED | OUTPATIENT
Start: 2018-12-20 | End: 2018-12-22

## 2018-12-20 RX ORDER — SODIUM CHLORIDE 9 MG/ML
INJECTION, SOLUTION INTRAVENOUS ONCE
Status: COMPLETED | OUTPATIENT
Start: 2018-12-20 | End: 2018-12-20

## 2018-12-20 NOTE — CM/SW NOTE
Accepted to [facility] ( [phone])      Ins.auth. received, auth # [id number]. Please call riya Quinn [phone] once  DC is confirmed.      Kleber Speaker, LMSW., [id number]

## 2018-12-20 NOTE — PROGRESS NOTES
[facility] AND [facility] Infectious Disease Progress Note    Kolton Herrera Patient Status:  Inpatient    1940 MRN [id number]   Location [facility] 4W/SW/SE Attending Aliyah Ariza, DO   Hosp Day # 3 PCP Sherrell Centeno MD     Subjective:  P (COLACE) cap 100 mg, 100 mg, Oral, BID  •  PEG 3350 (MIRALAX) powder packet 17 g, 17 g, Oral, Daily PRN  •  magnesium hydroxide (MILK OF MAGNESIA) 400 MG/5ML suspension 30 mL, 30 mL, Oral, Daily PRN  •  bisacodyl (DULCOLAX) rectal suppository 10 mg, 10 mg, exchange on 12/18  -OR cultures 1 of 3 currently with enterobacter, cultures 2 of 3 and 3 of three with GNR  -elevated CRP at 26.8  -nares negative for MRSA  -on IV vancomycin and cefepime  -will d/c cefepime and start meropenem  2.   PVD with chronic foot

## 2018-12-20 NOTE — PHYSICAL THERAPY NOTE
AM PT session: Pt drowsy tolerating low intensity exercise in bed and still attempting to wake up still on CPAP. Safety measures discussed but pt not able to participate in transfers out of bed d/t fatigue.  Full PT eval deferred until the PM session to all

## 2018-12-20 NOTE — PROGRESS NOTES
[facility] Hospitalist Progress Note     Reason for Admission: right hip infection  PCP: Benjamín Vuong MD     Assessment/Plan:     Active Problems:    Joint infection [facility])    Mr. Leonce Seip is a 70 yo male with PMH sig for bladder ca s/p chemo, BPH, CKD 3, CAD, aspirin dose for now, resume when off ecotrin BID dosing.  Resume plavix when h/h stable     #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 cataplexy  -provigil was held since prior admit as pt was having insomnia while hospitalized and at facility      #Chronic bilateral low 12/18/18   1747  12/19/18   0636   RBC  2.48*  2.45*   --   2.47*   HGB  7.7*  7.5*  9.0*  7.5*   HCT  22.8*  22.6*   --   22.6*   MCV  91.7  92.5   --   91.5   MCH  30.9  30.5   --   30.2   MCHC  33.7  33.0   --   33.0   RDW  15.6*  15.8*   --   15.6* Normal Saline Flush, Normal Saline Flush, HYDROmorphone HCl **OR** HYDROmorphone HCl **OR** HYDROmorphone HCl, Normal Saline Flush, acetaminophen, morphINE sulfate **OR** morphINE sulfate **OR** morphINE sulfate, acetaminophen **OR** Acetaminophen-Code

## 2018-12-20 NOTE — PHYSICAL THERAPY NOTE
PHYSICAL THERAPY HIP EVALUATION - INPATIENT     Room Number: 420/420-A  Evaluation Date: 12/20/2018  Type of Evaluation: Initial  Physician Order: PT Eval and Treat    Presenting Problem: S/P Post THR RLE I&D Abx beads s/p infection  Reason for Therapy:  [place] HISTORY     History related to current admission: previous post THR RLE with BULL with infection return to hospital for an I&D and antibiotic beads placed      Problem List  Active Problems:    Joint infection [facility])      Past Medical History  Past Medical H Pt also owns a w/c but was not using it per PLOF. SUBJECTIVE  I feel shocked that I am able to stand and take a few steps it feels like it's been weeks since I have walked.      PHYSICAL THERAPY EXAMINATION     OBJECTIVE  Precautions: JOHN - posterior(no 72.57%   Standardized Score (AM-PAC Scale): 33.86   CMS Modifier (G-Code): CL    FUNCTIONAL ABILITY STATUS  Gait Assessment   Gait Assistance:  Moderate assistance  Distance (ft): 6  Assistive Device: Rolling walker  Pattern: R Decreased stance time;L Foot

## 2018-12-20 NOTE — PROGRESS NOTES
[facility] Hospitalist Progress Note     Reason for Admission: right hip infection  PCP: Monica Argueta MD     Assessment/Plan:     Active Problems:    Joint infection [facility])    Mr. Tiffany Wynne is a 68 yo male with PMH sig for bladder ca s/p chemo, BPH, CKD 3, CAD, disease) [facility]  -holding asa, plavix until OR plans finalized     #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 cataplexy  -provigil was held since prior admit as pt was having insomnia while hospitalized and at facility      #Chronic bilateral low back pain with bilateral Recent Labs   Lab  12/17/18   1725  12/18/18   0754  12/18/18   1747  12/19/18   0636   RBC  2.48*  2.45*   --   2.47*   HGB  7.7*  7.5*  9.0*  7.5*   HCT  22.8*  22.6*   --   22.6*   MCV  91.7  92.5   --   91.5   MCH  30.9  30.5   --   30.2   MCHC  33 Normal Saline Flush, Normal Saline Flush, HYDROmorphone HCl **OR** HYDROmorphone HCl **OR** HYDROmorphone HCl, Normal Saline Flush, acetaminophen, morphINE sulfate **OR** morphINE sulfate **OR** morphINE sulfate, acetaminophen **OR** Acetaminophen-Code

## 2018-12-20 NOTE — OCCUPATIONAL THERAPY NOTE
OCCUPATIONAL THERAPY EVALUATION - INPATIENT      Room Number: 420/420-A  Evaluation Date: 12/20/2018  Type of Evaluation: Initial  Presenting Problem: I&D with poly & head exchange R JOHN w/antibiotic beads 12/18/18    Physician Order: IP Consult to Tucker Ortiz patients with this level of impairment may benefit from subacute rehab vs acute rehab pending progress.     DISCHARGE RECOMMENDATIONS  OT Discharge Recommendations: 24 hour care/supervision(BULL vs acute rehab )  OT Device Recommendations: TBD    PLAN  OT Tr SITUATION  Type of Home: House  Home Layout: One level  Lives With: Alone                  Hand Dominance: Right  Drives: Yes  Patient Regularly Uses: Glasses([place])    Stairs in Home: 0STE; 14 to upstairs  Assistive Device(s) Used: RW       Prior Level of I Transfer: n/t   Chair Transfer: mod.  A x1 w/RW  Car Transfer: n/t     Bedroom Mobility: n/t    FUNCTIONAL ADL ASSESSMENT  Grooming: set up  Feeding: n/t   Bathing: n/t   Toileting: total A; incontinent   Upper Body Dressing: n/t   Lower Body Dressing: n/t

## 2018-12-21 PROCEDURE — 97535 SELF CARE MNGMENT TRAINING: CPT

## 2018-12-21 PROCEDURE — 97530 THERAPEUTIC ACTIVITIES: CPT

## 2018-12-21 PROCEDURE — 85018 HEMOGLOBIN: CPT | Performed by: HOSPITALIST

## 2018-12-21 PROCEDURE — A4216 STERILE WATER/SALINE, 10 ML: HCPCS | Performed by: INTERNAL MEDICINE

## 2018-12-21 PROCEDURE — 36592 COLLECT BLOOD FROM PICC: CPT

## 2018-12-21 PROCEDURE — 85014 HEMATOCRIT: CPT | Performed by: HOSPITALIST

## 2018-12-21 RX ORDER — ACETAMINOPHEN AND CODEINE PHOSPHATE 300; 30 MG/1; MG/1
1-2 TABLET ORAL EVERY 4 HOURS PRN
Qty: 60 TABLET | Refills: 0 | Status: ON HOLD | OUTPATIENT
Start: 2018-12-21 | End: 2019-01-01

## 2018-12-21 RX ORDER — SODIUM CHLORIDE 9 MG/ML
INJECTION, SOLUTION INTRAVENOUS
Status: DISPENSED
Start: 2018-12-21 | End: 2018-12-22

## 2018-12-21 RX ORDER — ASPIRIN 325 MG
325 TABLET, DELAYED RELEASE (ENTERIC COATED) ORAL 2 TIMES DAILY
Qty: 84 TABLET | Refills: 0 | Status: ON HOLD | OUTPATIENT
Start: 2018-12-21 | End: 2019-01-01

## 2018-12-21 RX ORDER — METOCLOPRAMIDE HYDROCHLORIDE 5 MG/ML
5 INJECTION INTRAMUSCULAR; INTRAVENOUS EVERY 8 HOURS PRN
Status: DISCONTINUED | OUTPATIENT
Start: 2018-12-21 | End: 2018-12-22

## 2018-12-21 NOTE — OCCUPATIONAL THERAPY NOTE
OCCUPATIONAL THERAPY TREATMENT NOTE - INPATIENT    Room Number: 420/420-A         Presenting Problem: I&D with poly & head exchange R JOHN w/antibiotic beads 12/18/18     Problem List  Active Problems:    Infected prosthesis of right hip ([address]      Raji Duke abduction )    WEIGHT BEARING RESTRICTION  Weight Bearing Restriction: R lower extremity        R Lower Extremity: Weight Bearing as Tolerated       PAIN ASSESSMENT  Rating: Unable to rate  Location: (RLE with movement )  Management Techniques: Repositioni toilet transfer with min. A x1 w/RW. Comment: Min. A x2 for bed to chair     Patient will complete self care tasks standing at sink level with min. A x1.     Comment: N/t    Patient will independently recall posterior hip & no active ABDuction precautions

## 2018-12-21 NOTE — PROGRESS NOTES
Lilian Comment is a 69year old male. No chief complaint on file. HPI:    Not much pain/discomfort    REVIEW OF SYSTEMS:   A comprehensive 11 point review of systems was completed. Pertinent positives and negatives noted in the the HPI.     Allerg Location: Right arm)   Pulse 79   Temp 97.6 °F (36.4 °C) (Oral)   Resp 20   Ht 5' 6\" (1.676 m)   Wt 225 lb 9.6 oz (102.3 kg)   SpO2 100%   BMI 36.41 kg/m²   GENERAL:  Awake, alert, oriented x3. Non-tox, non-septic and in NAD.   HEENT:  Normocephalic, N[place] g/dl    RDW 15.6 (H) 11.0 - 15.0 %     140 - 400 K/UL    MPV 9.6 7.4 - 10.3 fL   COMP METABOLIC PANEL (14)   Result Value Ref Range    Glucose 137 (H) 70 - 99 mg/dL    Sodium 133 (L) 136 - 144 mmol/L    Potassium 4.4 3.3 - 5.1 mmol/L    Chloride 108 mmol/L    CO2 20 (L) 22 - 32 mmol/L    BUN 42 (H) 8 - 20 mg/dL    Creatinine 1.49 0.50 - 1.50 mg/dL    Calcium, Total 9.1 8.5 - 10.5 mg/dL    BUN/CREA Ratio 28.2 (H) 10.0 - 20.0    Anion Gap 9 0 - 18 mmol/L    Calculated Osmolality 294 275 - 295 mOsm/kg 32 mmol/L    BUN 52 (H) 8 - 20 mg/dL    Creatinine 1.67 (H) 0.50 - 1.50 mg/dL    Calcium, Total 9.0 8.5 - 10.5 mg/dL    BUN/CREA Ratio 31.1 (H) 10.0 - 20.0    Anion Gap 6 0 - 18 mmol/L    Calculated Osmolality 298 (H) 275 - 295 mOsm/kg    GFR, Non-African Information       T84.51xa Infected Prosthesis Of Right Hip (Hcc).           Gross Description       The specimen is received without fixative labeled \"June, right hip hardware\" and consists of two pieces of orthopedic hardware, including a nearly spher seen     Tissue Smear No organisms seen    CBC W/ DIFFERENTIAL   Result Value Ref Range    WBC 9.7 4.0 - 11.0 K/UL    RBC 2.47 (L) 4.50 - 5.90 M/UL    HGB 7.5 (L) 13.5 - 17.5 g/dL    HCT 22.6 (L) 41.0 - 52.0 %    MCV 91.5 80.0 - 100.0 fL    MCH 30.2 27.0 -

## 2018-12-21 NOTE — PROGRESS NOTES
[address] Patient Status:  Inpatient    1940 MRN [id number]   Location [facility] 4W/SW/SE Attending Konstantin Nieves, DO   Hosp Day # 4 PCP Kishor Freitas MD     Subjective:  Procedure(s):  [address] Intravenous Q12H   Normal Saline Flush 0.9 % injection 10 mL 10 mL Intravenous PRN   Vancomycin HCl (VANCOCIN) 1,250 mg in sodium chloride 0.9 % 500 mL IVPB 15 mg/kg (Adjusted) Intravenous Q24H   Vancomycin HCl (FIRVANQ) 50 MG/ML oral solution 125 mg 125 m Oral Nightly   QUEtiapine Fumarate (SEROQUEL) tab 100 mg 100 mg Oral Nightly

## 2018-12-21 NOTE — PHYSICAL THERAPY NOTE
PHYSICAL THERAPY HIP TREATMENT NOTE - INPATIENT    Room Number: 420/420-A            Presenting Problem: S/P Post THR RLE I&D Abx beads s/p infection    Problem List  Active Problems:    Infected prosthesis of right hip ([address]      PHYSICAL THERAPY ASSESSME Reason For Visit  HERBIERTO ESPINOZA is a(n) 89 year old established patient here today for follow-up management of History of Right breast cancer , NOW - Right breast cancer recurrence . Patient had Right breast lumpectomy done 08/18/16 and re-excision done 08/25/16 - for DCIS and IDC. Staging CT Chest/Abdomen/Pelvic and NM bone scan done 09/09/17. HERIBERTO ESPINOZA was offered a chaperone, but declined. She is accompanied by her adult child .   :  services not used.        Referred By  Referred By:   Dr Ly      History of Present Illness  HERIBERTO ESPINOZA is a 89 year old female here for follow up with results of staging CT chest/abd/pelvic and NM bone scan. This is a very pleasant 89-year-old woman who presents with a history of right breast cancer. She underwent diagnostic imaging which revealed a 1.7 cm mass at the right breast in the suggestive of malignancy. She denies any associated pain. She denies any symptoms at the left breast and denies any nipple discharge. She states that her mother had breast cancer at the age of 64. She underwent right lumpectomy with sentinel node biopsy on 08/18/2016. Final pathology revealed 2 benign lymph nodes and a 2.5 cm invasive ductal carcinoma with extensive DCIS. Unfortunately, her lateral margin was positive. She underwent reexcision of the lateral margin on 08/25/2016. Final pathology did reveal additional invasive carcinoma and DCIS, but the final margin was negative. She has done well since the time of surgery, but has a small chronic seroma at the right axilla, improving. Her most recent breast imaging she was found to have a new nodule at the prior lumpectomy bed.      Allergies  No Known Drug Allergies    Current Meds  Adjustable Lancing Device Miscellaneous;  Therapy: 14Apr2016 to Recorded  Alcohol Prep 70 % Pad;  Therapy: 14Apr2016 to Recorded  Furosemide 20 MG Oral Tablet; TAKE 1 TABLET BY MOUTH ONCE DAILY;  Therapy: 24Jan2011 to  (Evaluate:92Itz5985)  Requested for: 38Ayk7726; Last  Rx:[id number] Ordered  Levothyroxine Sodium 25 MCG Oral Tablet; TAKE 1 TABLET BY MOUTH ONCE DAILY;  Therapy: 01Mar2014 to (Evaluate:48Bqn4680)  Requested for: 29Dot3295; Last  Rx:[id number] Ordered  Metoprolol Succinate ER 50 MG Oral Tablet Extended Release 24 Hour; TAKE 1 TABLET  BY MOUTH ONCE A DAY;  Therapy: 53Tuz5079 to (Evaluate:27Nov2017)  Requested for: 49Gib9809; Last  Rx:[id number] Ordered  Multiple Vitamins/Iron Oral Tablet; TAKE 1 TABLET BY MOUTH ONCE DAILY;  Therapy: 30Mar2017 to (Evaluate:01Oct2017)  Requested for: 57Ogt1567; Last  Rx:[id number] Ordered  Warfarin Sodium 4 MG Oral Tablet; TAKE 1 TABLET BY MOUTH ONCE DAILY;  Therapy: 07Nov2014 to (Evaluate:08Nov2017)  Requested for: 58Ape9954; Last  Rx:[id number] Ordered    Active Problems  ACP (advance care planning) (V65.49) (Z71.89)   · -patient is DNR, selective treatment, no art nutrition      -HCPOA son Jag [phone]  At high risk for falls (V15.88) (Z91.81)  Atrial fibrillation (427.31) (I48.91)   · Last Impression: 02 May 2017  Controlled. Continue anticoagulation.  BRIAN PLUNKETT      (Cardiology)  Benign hypertensive heart and CKD, stage 3 (GFR 30-59), w CHF (404.11,585.3,428.0)  (I13.0,N18.3,I50.9)   · Last Impression: 18 Oct 2016  Per PCP  BRINA PLUNKETT (Cardiology)  Callus of foot (700) (L84)  CKD (chronic kidney disease), stage IV (585.4) (N18.4)  Diabetes mellitus with chronic kidney disease (250.40,585.9) (E11.22)   · Last Impression: 02 May 2017  Per PCP.  BRIAN PLUNKETT (Cardiology)  Diabetes mellitus with peripheral circulatory disorder (250.70,443.81) (E11.51)  Diastolic congestive heart failure (428.30,428.0) (I50.30)   · Last Impression: 02 May 2017  No signs of volume overload.  BRIAN PLUNKETT (Cardiology)  H/O aortic valve replacement with porcine valve (V42.2) (Z95.3)   · AVR mechanical valve   · Last Impression: 02 May 2017  Stable. Recent echocardiogram showed normal       Fair -  Static Standing: Poor  Dynamic Standing: Poor -  ACTIVITY TOLERANCE                         O2 WALK                  AM-PAC '6-Clicks' INPATIENT SHORT FORM - BASIC MOBILITY  How much difficulty does the patient currently have. ..  -   Turning over i is able to demonstrate transfers Sit to/from Stand at assistance level: modified independent     Goal #2  Current Status Mod A x2 from the bed and max A x2 from the bs chair.  Am  Max A x2 from the chair pm   Goal #3 Patient is able to ambulate 300 feet wit mechanical aortic valve function.  BRIAN CARRENO (Cardiology)  History of breast cancer (V10.3) (Z85.3)  Hyperlipidemia (272.4) (E78.5)   · Last Impression: 02 May 2017  Continue statin.  BRIAN CARRENO (Cardiology)  Hypothyroidism (244.9) (E03.9)   · Last Impression: 18 Oct 2016  Per PCP.  BRIAN CARRENO (Cardiology)  Impaired mobility (799.89) (Z74.09)  Long term current use of anticoagulant therapy (V58.61) (Z79.01)   · to follow up with RN  Palliative care encounter (V66.7) (Z51.5)  Pre-op testing (V72.84) (Z01.818)  Recurrent breast cancer, right (174.9) (C50.911)  S/P lumpectomy, right breast (V45.89) (Z90.11)  Secondary peripheral vascular disease (443.81) (I73.9)        Hyperlipidemia (272.4) (E78.5)     - Last Impression: 18 Oct 2016  Continue statin.  BRIAN CARRENO (Cardiology)  Long term current use of anticoagulant therapy (V58.61) (Z79.01)     - to follow up with RN       Diastolic congestive heart failure (428.30) (I50.30)       Secondary peripheral vascular disease (443.81) (I73.9)       Atrial fibrillation (427.31) (I48.91)     - Last Impression: 18 Oct 2016  Controlled. Continue anticoagulation.  BRIAN CARRENO (Cardiology)  Diabetes mellitus with chronic kidney disease (250.40) (E11.22)       H/O aortic valve replacement with porcine valve (V42.2) (Z95.3)     - AVR mechanical valve     - Last Impression: 13 Sep 2016  Resumed warfarin, last INR 2.9 last week, done by PCP. Did not get echocardiogram in hospital so it will be done as an outpatient. Will get echo done prior to seeing Dr. Carreno in October.  SHELLEY FUCHS (Cardiology)  At high risk for falls (V15.88) (Z91.81)        Palliative care encounter (V66.7) (Z51.5)       ACP (advance care planning) (V65.49) (Z71.89)     - -patient states she is DNR, selective treatment, no art nutrition   -HCPOA son Jag [phone]       Status post fall (V15.88) (Z91.81)          Past Medical History  History of Acquired absence of both cervix and uterus  (V88.01) (Z90.710)  History of Anxiety (300.00) (F41.9)  History of Blood In Urine Color  History of Cellulitis (682.9) (L03.90)  History of Cellulitis of right leg (682.6) (L03.115)  History of Change, skin texture (782.8) (R23.4)   · ? etiology, to see rhematologist for evaluation  History of Debility (799.3) (R53.81)  History of Decreased oral intake (783.9) (R63.8)  History of Diabetes mellitus with renal manifestation (250.40) (E11.29)  History of Diverticulitis of small intestine (562.01) (K57.12)  History of Dry mouth (527.7) (R68.2)  History of Dyspepsia (536.8) (R10.13)  History of Dysuria (788.1) (R30.0)  History of Fall at home (E888.9,E849.0) (W19.XXXA,Y92.099)  History of Frequency of urination and polyuria (788.41,788.42) (R35.0,R35.8)  History of Gait instability (781.2) (R26.81)  History of Headache, temporal (784.0) (R51)  History of anemia (V12.3) (Z86.2)  History of anorexia nervosa (V11.8) (Z86.59)  History of cardiomegaly (V12.59) (Z86.79)  History of constipation (V12.79) (Z87.19)  History of dehydration (V12.29) (Z86.39)  History of diabetes mellitus (V12.29) (Z86.39)   · Last Impression: 18 Oct 2016  Per PCP.  BRIAN PLUNKETT (Cardiology)  History of diarrhea (V12.79) (Z87.898)  History of eczema (V13.3) (Z87.2)  History of edema (V13.89) (Z87.898)   · Last Impression: 10 Nov 2015  Has the appearance of venous insufficiency. I had a      discussion with her regarding keeping her feet elevated as well as compression      stockings and physical activity. She appears to understand.  BRIAN PLUNKETT (Cardiology)  History of esophageal reflux (V12.79) (Z87.19)  History of fatigue (V13.89) (Z87.898)  History of fever (V13.89) (Z87.898)  History of gastritis (V12.79) (Z87.19)  History of hematuria (V13.09) (Z87.448)  History of hypertension (V12.59) (Z86.79)   · Last Impression: 18 Oct 2016  Well controlled.  BRIAN PLUNKETT (Cardiology)  History of low back pain (V13.59) (Z87.39)   · ? etiology,  History  of stasis dermatitis (V13.3) (Z87.2)  History of upper respiratory infection (V12.09) (Z87.09)  History of urinary frequency (V13.09) (Z87.898)  History of urinary tract infection (V13.02) (Z87.440)  History of vertigo (V12.49) (Z87.898)  History of weakness (V13.89) (Z87.898)  History of Hospitalization within last 30 days (V15.89) (Z92.89)  History of Impaired mobility and ADLs (799.89) (Z74.09)  History of Joint pain, hip (719.45) (M25.559)  History of Joint Pain, Localized In The Knee (719.46)  History of JVD (jugular venous distension) (785.9) (R09.89)  History of Localized osteoarthritis of hand (715.34) (M19.049)  History of Lump of right breast (611.72) (N63)  History of Lump of skin (782.2) (R22.9)  History of Nasal congestion (478.19) (R09.81)  Need for immunization against influenza (V04.81) (Z23)  History of Need for immunization against influenza (V04.81) (Z23)  History of Neuropathic pain of both legs (356.9) (G57.91,G57.92)  History of Pelvic pressure in female (625.8) (N94.89)  Personal history of other disease of respiratory system (V12.69) (Z87.09)   · still weak but feeling better  History of Poor appetite (783.0) (R63.0)  History of Preoperative clearance (V72.84) (Z01.818)   · Last Impression: 02 Aug 2016  She represents a low to moderate risk from a cardiac      standpoint for mastectomy. However I would suggest that she be brought in several days      prior to the surgery and be bridged with heparin infusion while she awaits Coumadin      levels to drift down to safe levels.  BRIAN PLUNKETT (Cardiology)  History of Rash (782.1) (R21)  History of Screening for colon cancer (V76.51) (Z12.11)  History of Screening for malignant neoplasm of breast (V76.10) (Z12.31)  Status post fall (V15.88) (Z91.81)  History of Venous insufficiency (459.81) (I87.2)  History of Venous stasis dermatitis of both lower extremities (454.1) (I87.2)   · Last Impression: 13 Sep 2016  Instructed to wear support hose,  discussed light weight      knee socks they could purchase at a drug store.  SHELLEY FUCHS (Cardiology)  History of Vitamin D deficiency (268.9) (E55.9)    Surgical History  History of Cholecystectomy  History of Intestinal Surgery    Family History  Family History   Family history of Breast Cancer (V16.3)   · mother had at old age  Denied: Family history of Colon Cancer  Denied: Family history of malignant neoplasm of cervix uteri  Denied: Family history of malignant neoplasm of uterus  Denied: Family history of Ovarian cancer    Social History  Denied: Alcohol  Never a smoker  Never smoker  Denied: Tobacco Use    Physical Exam  Breasts: Inspect/Palpate Breasts & Axillae: Well-healed right breast and axillary incision, small axillary seroma, improved and nontender. Small subcentimeter nodule at the lateral aspect of the right breast incision. Both breasts normal in appearance, no palpable masses, no suspicious skin changes, no supraclavicular adenopathy, no axillary adenopathy, no nipple changes and no nipple discharge of both breasts.      Pathology  Final Pathologic State   Surgery: Right breast: 07/21/2016: Invasive ductal carcinoma, estrogen receptor with 100 percent, progesterone receptor 80 percent and HER-2/garfield nonamplified.  08/18/2016: Right breast sentinel node: 2 lymph nodes negative for tumor. Right breast 4:00 lumpectomy invasive ductal carcinoma, grade 2 with DCIS, positive lateral margin. Tumor measuring 2.5 cm.  08/25/2016: Right breast lateral margin: Portion of breast tissue with residual invasive ductal carcinoma (9mm), final margin negative.      Results/Data  07/05/2016: Bilateral mammogram with ultrasound: 1.7 cm mass at the right breast, 4:00, 8 cm from the nipple.  14 2017: Postlumpectomy changes at the right breast, benign. 2.6 cm seroma at the right axilla.  August 30, 2017: Bilateral mammogram with ultrasound: Highly suspicious 7 mm mass at the 4:00 right breast. 1.6 cm postsurgical seroma  at the right axilla.  September 9, 2017: CT scan of the abdomen pelvis: No evidence of metastatic disease. Cyst versus hemangioma of the left hepatic lobe. Right renal calculus and diverticulosis.  September 9, 2017: Bone scan: No evidence of metastatic disease.       Assessment   1. Recurrent breast cancer, right (174.9) (C50.911)    Plan  I discussed her findings with her and her family at length. I told her that I am very concerned about a breast cancer recurrence at this time. She does not appear to have evidence of any distant metastatic disease. I discussed her options with her. I told her that given that this is a local recurrence I would recommend local excision of the area. However the patient stated that given her age and medical issues she would not agree to surgery at this time. The patient stated she would rather die than have surgery. I therefore recommended that she meet with her medical oncologist and discuss the use and role of endocrine therapy. We have arranged for her to meet with the medical oncologist. She will return to see me in 2 months for follow-up. I answered her questions and she agrees with the plan.      Time    Total face to face time spent with patient 15 minutes. Greater than 50% of the total time was spent counseling and/or coordinating care.      Signatures   Electronically signed by : SUDARSHAN JOHNSON M.D.; Sep 13 2017  4:57PM CST

## 2018-12-22 VITALS
WEIGHT: 225.63 LBS | BODY MASS INDEX: 36.26 KG/M2 | HEART RATE: 76 BPM | TEMPERATURE: 97 F | DIASTOLIC BLOOD PRESSURE: 74 MMHG | OXYGEN SATURATION: 97 % | HEIGHT: 66 IN | RESPIRATION RATE: 18 BRPM | SYSTOLIC BLOOD PRESSURE: 166 MMHG

## 2018-12-22 PROCEDURE — 97530 THERAPEUTIC ACTIVITIES: CPT

## 2018-12-22 PROCEDURE — 97116 GAIT TRAINING THERAPY: CPT

## 2018-12-22 PROCEDURE — 85025 COMPLETE CBC W/AUTO DIFF WBC: CPT | Performed by: HOSPITALIST

## 2018-12-22 PROCEDURE — 97110 THERAPEUTIC EXERCISES: CPT

## 2018-12-22 RX ORDER — ACETAMINOPHEN 325 MG/1
650 TABLET ORAL EVERY 6 HOURS PRN
Refills: 0 | Status: SHIPPED | COMMUNITY
Start: 2018-12-22 | End: 2019-01-01 | Stop reason: ALTCHOICE

## 2018-12-22 RX ORDER — MELATONIN
325 2 TIMES DAILY WITH MEALS
Qty: 60 TABLET | Refills: 0 | Status: SHIPPED | OUTPATIENT
Start: 2018-12-22

## 2018-12-22 NOTE — CM/SW NOTE
DELFINO informed by RN that pt is medically stable for discharge today at 5:00pm. DELFINO spoke with Nicolás Cancer the nurse liaison from the [facility] [phone]  who confirmed that they are able to accept the pt at that time into room 1120.  Delfino spoke with pt's so

## 2018-12-22 NOTE — PROGRESS NOTES
[facility] AND [facility] Infectious Disease Progress Note    Camila Bernheim Patient Status:  Inpatient    1940 MRN [id number]   Location [facility] 4W/SW/SE Attending Stephanie Monaco DO   Hosp Day # 5 PCP Violeta Velasquez MD     Subjective:  P injection 4 mg, 4 mg, Intravenous, Q6H PRN  •  docusate sodium (COLACE) cap 100 mg, 100 mg, Oral, BID  •  PEG 3350 (MIRALAX) powder packet 17 g, 17 g, Oral, Daily PRN  •  magnesium hydroxide (MILK OF MAGNESIA) 400 MG/5ML suspension 30 mL, 30 mL, Oral, Jason 12/22/2018    HGB 7.8 12/22/2018    HCT 23.5 12/22/2018     12/22/2018       Radiology:  Reviewed     Assessment/Plan:    1.  R JOHN site infection: with erythema and drainage,   - S/P wash out and Poly exchange on 12/18. OR cul with Enterobacter Aero

## 2018-12-22 NOTE — CM/SW NOTE
SW informed by RN that pt is medically stable for discharge today at pm. DELFINO spoke with [facility] [phone]  from  who confirmed that they are able to accept the pt at that time.  Delfino left a message regarding d/c plan and medicar private pay fee wi

## 2018-12-22 NOTE — PLAN OF CARE
Jael Dasabad and son updated on discharge plan- plan is transfer to rehab today [facility]. explained MD f/u in 2 weeks w surgeon,facility will assist w making transport arrangements, explaind picc and plan for 6 weeks antibx.  Suma dressing, [employer]

## 2018-12-22 NOTE — OCCUPATIONAL THERAPY NOTE
Attempted to work with patient this afternoon; per RN, patient is being d/c to rehab this afternoon and drains are to be pulled and patient is to be cleaned up in prep for d/c; will defer this session until tomorrow in case patient d/c does not go through.

## 2018-12-22 NOTE — PHYSICAL THERAPY NOTE
PHYSICAL THERAPY HIP TREATMENT NOTE - INPATIENT    Room Number: 420/420-A            Presenting Problem: S/P Post THR RLE I&D Abx beads s/p infection    Problem List  Active Problems:    Infected prosthesis of right hip ([address]      PHYSICAL THERAPY ASSESSME Lot   How much help from another person does the patient currently need. ..   -   Moving to and from a bed to a chair (including a wheelchair)?: A Lot   -   Need to walk in hospital room?: A Lot   -   Climbing 3-5 steps with a railing?: Total     AM-PAC Sco

## 2018-12-22 NOTE — PLAN OF CARE
NEUROLOGICAL - ADULT    • Achieves stable or improved neurological status Progressing    Alert- CMS intact-S/P right hip I& D       PAIN - ADULT    • Verbalizes/displays adequate comfort level or patient's stated pain goal Progressing    Pain managed w tyl

## 2018-12-23 NOTE — DISCHARGE SUMMARY
[facility] Hospitalist Discharge Summary   Patient ID:  Millie Julien  [id number]  82 year old  4/4/1940    Admit date: 12/17/2018  Discharge date: 12/22/2018  Primary Care Physician: Estrella Mera MD   Attending Physician: No att. providers found   Consu line placed 12/18, plan for 6 weeks IV abx with invanz and plan on po suppression thereafter  -OR cx with enterobacter aerogenes  -post op acute blood loss anemia, post op hgb is 7.5 ->1 unit intra op->7.5->7. 8      #Essential hypertension- bp low normal i Procedure(s) (LRB):  HIP TOTAL ARTHROPLASTY REVISION (Right)  Radiology:         Discharge Instructions     Medication List      START taking these medications    acetaminophen 325 MG Tabs  Commonly known as:  TYLENOL     Acetaminophen-Codeine #3 300-30 MG [address], [phone], [address] [place] [address], [place] [address]    Phone:  [phone]   · aspirin  MG Tbec     You can get these medications from any pharmacy    Bring a paper prescription for each of these medications  · Aceta

## 2018-12-28 ENCOUNTER — HOSPITAL ENCOUNTER (INPATIENT)
Facility: HOSPITAL | Age: 78
LOS: 26 days | Discharge: NURSING, REHAB OR LONG-TERM CARE | DRG: 466 | End: 2019-01-23
Attending: HOSPITALIST | Admitting: HOSPITALIST
Payer: MEDICARE

## 2018-12-28 DIAGNOSIS — T84.51XA INFECTED PROSTHESIS OF RIGHT HIP (HCC): ICD-10-CM

## 2018-12-28 PROBLEM — L08.9 WOUND INFECTION: Status: ACTIVE | Noted: 2018-12-28

## 2018-12-28 PROBLEM — T14.8XXA WOUND INFECTION: Status: ACTIVE | Noted: 2018-12-28

## 2018-12-28 RX ORDER — BISACODYL 10 MG
10 SUPPOSITORY, RECTAL RECTAL
Status: DISCONTINUED | OUTPATIENT
Start: 2018-12-28 | End: 2019-01-23

## 2018-12-28 RX ORDER — ALLOPURINOL 300 MG/1
300 TABLET ORAL DAILY
Status: DISCONTINUED | OUTPATIENT
Start: 2018-12-29 | End: 2019-01-23

## 2018-12-28 RX ORDER — POLYETHYLENE GLYCOL 3350 17 G/17G
17 POWDER, FOR SOLUTION ORAL DAILY PRN
Status: DISCONTINUED | OUTPATIENT
Start: 2018-12-28 | End: 2019-01-23

## 2018-12-28 RX ORDER — SODIUM CHLORIDE 0.9 % (FLUSH) 0.9 %
3 SYRINGE (ML) INJECTION AS NEEDED
Status: DISCONTINUED | OUTPATIENT
Start: 2018-12-28 | End: 2019-01-23

## 2018-12-28 RX ORDER — ACETAMINOPHEN 325 MG/1
650 TABLET ORAL EVERY 6 HOURS PRN
Status: DISCONTINUED | OUTPATIENT
Start: 2018-12-28 | End: 2019-01-23

## 2018-12-28 RX ORDER — ESCITALOPRAM OXALATE 10 MG/1
20 TABLET ORAL DAILY
Status: DISCONTINUED | OUTPATIENT
Start: 2018-12-29 | End: 2019-01-09

## 2018-12-28 RX ORDER — METOPROLOL SUCCINATE 25 MG/1
25 TABLET, EXTENDED RELEASE ORAL DAILY
Status: DISCONTINUED | OUTPATIENT
Start: 2018-12-29 | End: 2019-01-23

## 2018-12-28 RX ORDER — PANTOPRAZOLE SODIUM 20 MG/1
20 TABLET, DELAYED RELEASE ORAL
Status: DISCONTINUED | OUTPATIENT
Start: 2018-12-29 | End: 2019-01-23

## 2018-12-28 RX ORDER — DOCUSATE SODIUM 100 MG/1
100 CAPSULE, LIQUID FILLED ORAL 2 TIMES DAILY
Status: DISCONTINUED | OUTPATIENT
Start: 2018-12-28 | End: 2019-01-23

## 2018-12-28 RX ORDER — SENNOSIDES 8.6 MG
17.2 TABLET ORAL NIGHTLY
Status: DISCONTINUED | OUTPATIENT
Start: 2018-12-28 | End: 2019-01-23

## 2018-12-28 RX ORDER — MELATONIN
325 2 TIMES DAILY WITH MEALS
Status: DISCONTINUED | OUTPATIENT
Start: 2018-12-28 | End: 2019-01-23

## 2018-12-28 RX ORDER — PRAVASTATIN SODIUM 20 MG
20 TABLET ORAL NIGHTLY
Status: DISCONTINUED | OUTPATIENT
Start: 2018-12-28 | End: 2019-01-23

## 2018-12-28 RX ORDER — ONDANSETRON 2 MG/ML
4 INJECTION INTRAMUSCULAR; INTRAVENOUS EVERY 6 HOURS PRN
Status: DISCONTINUED | OUTPATIENT
Start: 2018-12-28 | End: 2019-01-23

## 2018-12-28 RX ORDER — ACETAMINOPHEN AND CODEINE PHOSPHATE 300; 30 MG/1; MG/1
1-2 TABLET ORAL EVERY 4 HOURS PRN
Status: DISCONTINUED | OUTPATIENT
Start: 2018-12-28 | End: 2018-12-29

## 2018-12-28 RX ORDER — SODIUM CHLORIDE 9 MG/ML
INJECTION, SOLUTION INTRAVENOUS CONTINUOUS
Status: DISCONTINUED | OUTPATIENT
Start: 2018-12-28 | End: 2018-12-29

## 2018-12-28 RX ORDER — QUETIAPINE 100 MG/1
100 TABLET, FILM COATED ORAL NIGHTLY
Status: DISCONTINUED | OUTPATIENT
Start: 2018-12-28 | End: 2019-01-09

## 2018-12-29 RX ORDER — MORPHINE SULFATE 2 MG/ML
4 INJECTION, SOLUTION INTRAMUSCULAR; INTRAVENOUS EVERY 2 HOUR PRN
Status: DISCONTINUED | OUTPATIENT
Start: 2018-12-29 | End: 2018-12-31

## 2018-12-29 RX ORDER — NYSTATIN 100000 U/G
CREAM TOPICAL 2 TIMES DAILY
Status: DISCONTINUED | OUTPATIENT
Start: 2018-12-29 | End: 2019-01-23

## 2018-12-29 RX ORDER — ENOXAPARIN SODIUM 100 MG/ML
30 INJECTION SUBCUTANEOUS DAILY
Status: COMPLETED | OUTPATIENT
Start: 2018-12-29 | End: 2018-12-30

## 2018-12-29 RX ORDER — MAGNESIUM SULFATE HEPTAHYDRATE 40 MG/ML
2 INJECTION, SOLUTION INTRAVENOUS ONCE
Status: COMPLETED | OUTPATIENT
Start: 2018-12-29 | End: 2018-12-29

## 2018-12-29 RX ORDER — OXYCODONE HYDROCHLORIDE 5 MG/1
5 TABLET ORAL EVERY 4 HOURS PRN
Status: DISCONTINUED | OUTPATIENT
Start: 2018-12-29 | End: 2019-01-06

## 2018-12-29 RX ORDER — OXYCODONE HYDROCHLORIDE 5 MG/1
10 TABLET ORAL EVERY 4 HOURS PRN
Status: DISCONTINUED | OUTPATIENT
Start: 2018-12-29 | End: 2019-01-05

## 2018-12-29 RX ORDER — TRAMADOL HYDROCHLORIDE 50 MG/1
50 TABLET ORAL EVERY 6 HOURS PRN
Status: DISCONTINUED | OUTPATIENT
Start: 2018-12-29 | End: 2019-01-23

## 2018-12-29 NOTE — CM/SW NOTE
The pt. Is known to  from previous admission at [address] [place] [address]. The pt. Was at [facility] in [facility] where he was for rehab. CAN spoke with the pt's son Maco Quinn [phone] who confirmed the plan will be to return to [facility] when medically stable.

## 2018-12-29 NOTE — CONSULTS
[facility] - [facility]    Report of Consultation    Tressa Wall Patient Status:  Inpatient    1940 MRN [id number]   Location [facility] 4W/SW/SE Attending Rita Gómez MD   Hosp Day # 1 PCP Georgette Kelley MD     Date of Admission History:   Procedure Laterality Date   • CAROTID ENDARTERECTOMY Bilateral 2010   • HIP TOTAL ARTHROPLASTY REVISION Right 12/18/2018    Performed by Jignesh Solomon DO at [address] [place] [address] OR   • HIP TOTAL ARTHROPLASTY REVISION Right 12/6/2018    Performed by Caitlin Mehta mg Oral Q6H PRN   PEG 3350 (MIRALAX) powder packet 17 g 17 g Oral Daily PRN   magnesium hydroxide (MILK OF MAGNESIA) 400 MG/5ML suspension 30 mL 30 mL Oral Daily PRN   bisacodyl (DULCOLAX) rectal suppository 10 mg 10 mg Rectal Daily PRN   ondansetron HCl ( of Systems   Constitutional: Negative for chills and fever. HENT: Negative for hearing change . Eyes: Negative for vision change. Respiratory: Negative for shortness of breath. Cardiovascular: Negative for chest pain.    Musculoskeletal: Positive for 12/28/2018    ALT 12 (L) 12/28/2018    PTT 25.0 11/28/2018    INR 1.4 (H) 12/28/2018    PTP 16.8 (H) 12/28/2018    ESRML 94 (H) 12/28/2018    CRP 12.7 (H) 12/28/2018    MG 2.1 12/29/2018       Labs reviewed and interpreted by me.   His white count is within loss and medical comp occasions such as heart attack or stroke. At this point we will try to keep him stable with antibiotics.   Ideally would do this on Monday during a regular OR today with the orthopedic specialist team.  If he does become unstable befo

## 2018-12-29 NOTE — RESPIRATORY THERAPY NOTE
SAM ASSESSMENT:    Pt does have a previous diagnosis of SAM. Pt does routinely use a CPAP device at home. Pt brought home CPAP.

## 2018-12-29 NOTE — PLAN OF CARE
Impaired Functional Mobility    • Achieve highest/safest level of mobility/gait Not Progressing    Pt's daughter states he hasn't gotten OOB  for several days at rehab & outside hospital.  To get OOB today, may need lift.      SKIN/TISSUE INTEGRITY - ADULT

## 2018-12-29 NOTE — H&P
[facility] Hospitalist H&P       CC: Infected right hip     PCP: Kishor Freitas MD    History of Present Illness:    Mr. Afshan Dixon is a 70 yo male [address] [employer] sig for bladder ca s/p chemo, BPH, CKD 3, CAD, depression, HTN, HLD, SAM on cpap, PVD, chronic low back REVISION Right 12/6/2018    Performed by Roselyn Montgomery DO at [facility] OR   • [address]      fusion   • TOTAL HIP REPLACEMENT Right 98, 99   • TOTAL HIP REPLACEMENT Left 98, 2014        ALL:  No Known Allergies     Home Medications Years: 20.00        Pack years: 10        Types: Cigarettes        Quit date: 2014        Years since quittin.9      Smokeless tobacco: Former User    Alcohol use:  Yes      Alcohol/week: 0.0 oz      Comment: 1-2 glasses of wine/week       Roland BOTELLO PVD, chronic low back pain, chronic opiate use, obesity bmi 36, ARRIOLA, OA with prior R hip replacement with revision on 12/6, then reinfection with [facility]mit to [facility]p [facility]&D with poly and head exchange with ab's beads placed on 12/18 DC'ed to SNF on 12/22 with cataplexy  -provigil was held since prior admit as pt was having insomnia while hospitalized and at facility      #Chronic bilateral low back pain with bilateral sciatica/chronic opiate use  -T3#, OXY and prn morphine for now  - wean as able     #Obesity-

## 2018-12-29 NOTE — PROGRESS NOTES
[facility] Pharmacy Note:  Renal Adjustment for meropenem (MERREM)    Heide Shukla is a 69year old male who has been prescribed meropenem (MERREM) 500 mg every 8 hrs.   CrCl is CrCl cannot be calculated (Patient's most recent lab result is older than the maxi

## 2018-12-29 NOTE — CONSULTS
Donette Spatz is a 69year old male. No chief complaint on file.       HPI:    Rt hip prosthesis infection with enterobacter had poly exchange and here to rehab on invanz;hip worsened with pain and drainage;now positive blood c/s too    REVIEW OF SYSTE 3.2 oz (106.2 kg)   SpO2 (!) 89%   BMI 37.80 kg/m²   GENERAL:  Awake, alert, oriented x3. Non-tox, non-septic and in NAD. HEENT:  Normocephalic, Nonicteric,Conjunctiva pale  Oropharynx clear, trachea ML. NECK:  Supple, no masses, no lymphadenopathy.   Migue Wang 105 (H) 32 - 100 U/L    Bilirubin, Total 0.4 0.3 - 1.2 mg/dL    Total Protein 4.5 (L) 5.9 - 8.4 g/dL    Albumin 1.9 (L) 3.5 - 4.8 g/dL    Globulin 2.6 2.5 - 3.7 g/dL    A/G Ratio 0.7 (L) 1.0 - 2.0    Anion Gap 7 0 - 18 mmol/L    BUN/CREA Ratio 20.6 (H) 10. K/UL    RBC 2.46 (L) 4.50 - 5.90 M/UL    HGB 7.2 (L) 13.5 - 17.5 g/dL    HCT 22.1 (L) 41.0 - 52.0 %    MCV 90.0 80.0 - 100.0 fL    MCH 29.1 27.0 - 32.0 pg    MCHC 32.3 32.0 - 37.0 g/dl    RDW 16.3 (H) 11.0 - 15.0 %     140 - 400 K/UL    MPV 8.6 7.4

## 2018-12-29 NOTE — PROGRESS NOTES
[facility] Hospitalist Progress Note     Reason for Admission: right hip infection  PCP: Monica Argueta MD     Assessment/Plan:     Active Problems:    Infected prosthesis of right hip [facility]    Mr. Tiffany Wynne is a 70 yo male with PMH sig for bladder ca s/p chemo, home aspirin dose for now, resume when off ecotrin BID dosing.  Resume plavix when h/h stable     #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 cataplexy  -provigil was held since prior admit as pt was having insomnia while hospitalized and at facility      #Alex Haider 37*  45*   GFRNAA  32*  39*   CA  8.0*  8.4*   NA  139  140   K  4.2  4.0   CL  110  109   CO2  22  21*       Recent Labs   Lab  12/28/18   2241   ALT  12*   AST  15   ALB  1.9*       Imaging:          Meds:

## 2018-12-30 RX ORDER — MELATONIN
DAILY
Status: DISCONTINUED | OUTPATIENT
Start: 2018-12-30 | End: 2019-01-23

## 2018-12-30 NOTE — PROGRESS NOTES
[facility] AND [facility] Infectious Disease Progress Note    Kolton Herrera Patient Status:  Inpatient    1940 MRN [id number]   Location [facility] 4W/SW/SE Attending Arturo Stanley MD   Hosp Day # 2 PCP Sherrell Centeno MD     Subjective:  Lupe Mireles mg, Rectal, Daily PRN  •  ondansetron HCl (ZOFRAN) injection 4 mg, 4 mg, Intravenous, Q6H PRN  •  Meropenem (MERREM) 500 mg in sodium chloride 0.9% 100 mL MBP, 500 mg, Intravenous, Q12H  •  Vancomycin HCl (VANCOCIN) 1.5 g in sodium chloride 0.9 % 500 mL IV 12/18.  - OR cul with Enterobacter Aerogenes, Was on IV Ertapenem as OP for six weeks,   - Now readmitted with worsening erythema,drainage,   - agree with removal of prosthesis as failed IV abx and Polyexchange, planned for 12/31.  - On IV Vanc and IV Deidre

## 2018-12-30 NOTE — PROGRESS NOTES
[facility] Hospitalist Progress Note     CC: Hospital Follow up    PCP: Yesica Macias MD       Assessment/Plan:     Active Problems:    Wound infection    Mr. Ron Vázquez is a 68 yo male [address] [employer] sig for bladder ca s/p chemo, BPH, CKD 3, CAD, depression, HTN, HLD hgb 7.8-. 7.2 -> 7.8  -Type and screen, transfuse if less than 7      #SAM on CPAP  -cpap protocol  -encourage use with all sleep     #PAD (peripheral artery disease) (HCC)  -holding aspirin and plavix for now with upcoming surgery     #Primary narcolepsy MSK:right hip with long wound, edema noted  Skin: dry scaly skin all over, erythema noted in groin and arm pits  Neuro: AO*3, motor intact, no sensory deficits  Psyc: appropriate mood and affect      Data Review:       Labs:     Recent Labs   Lab  12/28/

## 2018-12-30 NOTE — PLAN OF CARE
PAIN - ADULT    • Verbalizes/displays adequate comfort level or patient's stated pain goal Progressing          Patient Centered Care    • Patient preferences are identified and integrated in the patient's plan of care Progressing          RISK FOR INFECTI

## 2018-12-30 NOTE — CONSULTS
[facility]    PATIENT'S NAME: Nury Shine   ATTENDING PHYSICIAN: Denver Tubbs MD   CONSULTING PHYSICIAN: Al Link MD   PATIENT ACCOUNT#:   [id number]    LOCATION:  [address] [place] [address] #:   [id number]       DATE OF BIRTH: noted.    LABORATORY DATA:  White count 6.6, hemoglobin 7.7, platelets 335,519. BUN 34 and creatinine 1.68. CRP 12.7. Sedimentation rate 94. IMPRESSION AND PLAN:    1.    Right prosthetic hip infection, not responding well to poly exchange and IV anti

## 2018-12-30 NOTE — PROGRESS NOTES
[facility] dosing service    Follow-up Pharmacokinetic Consult for Vancomycin Dosing     Arleen Gomez is a 69year old male admitted on 12/28 who is being treated for cellulitis. Patient is on day 3 of Vancomycin 1.5 gm IV Q 24 hours.   Goal trough ug/mL.    3.  Pharmacy will need BUN/Scr daily while on Vancomycin to assess renal function. 4.  Pharmacy will follow and monitor renal function changes, toxicity and efficacy.     Vinayak Pappas, PharmD  12/30/2018  4:39 PM  [facility] Pharmacy Extension:

## 2018-12-31 ENCOUNTER — APPOINTMENT (OUTPATIENT)
Dept: GENERAL RADIOLOGY | Facility: HOSPITAL | Age: 78
DRG: 466 | End: 2018-12-31
Attending: ORTHOPAEDIC SURGERY
Payer: MEDICARE

## 2018-12-31 ENCOUNTER — ANESTHESIA (OUTPATIENT)
Dept: SURGERY | Facility: HOSPITAL | Age: 78
DRG: 466 | End: 2018-12-31
Payer: MEDICARE

## 2018-12-31 ENCOUNTER — ANESTHESIA EVENT (OUTPATIENT)
Dept: SURGERY | Facility: HOSPITAL | Age: 78
DRG: 466 | End: 2018-12-31
Payer: MEDICARE

## 2018-12-31 PROCEDURE — 0SR90EZ REPLACEMENT OF RIGHT HIP JOINT WITH ARTICULATING SPACER, OPEN APPROACH: ICD-10-PCS | Performed by: ORTHOPAEDIC SURGERY

## 2018-12-31 PROCEDURE — P9045 ALBUMIN (HUMAN), 5%, 250 ML: HCPCS | Performed by: ANESTHESIOLOGY

## 2018-12-31 PROCEDURE — 30233N1 TRANSFUSION OF NONAUTOLOGOUS RED BLOOD CELLS INTO PERIPHERAL VEIN, PERCUTANEOUS APPROACH: ICD-10-PCS | Performed by: INTERNAL MEDICINE

## 2018-12-31 PROCEDURE — 0SP90JZ REMOVAL OF SYNTHETIC SUBSTITUTE FROM RIGHT HIP JOINT, OPEN APPROACH: ICD-10-PCS | Performed by: ORTHOPAEDIC SURGERY

## 2018-12-31 PROCEDURE — 73501 X-RAY EXAM HIP UNI 1 VIEW: CPT | Performed by: ORTHOPAEDIC SURGERY

## 2018-12-31 PROCEDURE — 36430 TRANSFUSION BLD/BLD COMPNT: CPT | Performed by: ANESTHESIOLOGY

## 2018-12-31 DEVICE — CABLE READY ASSEMBLY 1.8X635: Type: IMPLANTABLE DEVICE | Site: HIP | Status: FUNCTIONAL

## 2018-12-31 DEVICE — BONE CEMENT HI VISCOSITY R: Type: IMPLANTABLE DEVICE | Site: HIP | Status: FUNCTIONAL

## 2018-12-31 DEVICE — IMPLANTABLE DEVICE: Type: IMPLANTABLE DEVICE | Site: HIP | Status: FUNCTIONAL

## 2018-12-31 DEVICE — WAGNER SL REVISION® HIP STEM, UNCEMENTED, Ø 16/305, TAPER 12/14
Type: IMPLANTABLE DEVICE | Site: HIP | Status: FUNCTIONAL
Brand: WAGNER SL REVISION®

## 2018-12-31 RX ORDER — SODIUM CHLORIDE 9 MG/ML
INJECTION, SOLUTION INTRAVENOUS CONTINUOUS PRN
Status: DISCONTINUED | OUTPATIENT
Start: 2018-12-31 | End: 2018-12-31 | Stop reason: SURG

## 2018-12-31 RX ORDER — ALBUMIN, HUMAN INJ 5% 5 %
SOLUTION INTRAVENOUS CONTINUOUS PRN
Status: DISCONTINUED | OUTPATIENT
Start: 2018-12-31 | End: 2018-12-31 | Stop reason: SURG

## 2018-12-31 RX ORDER — LIDOCAINE HYDROCHLORIDE 10 MG/ML
INJECTION, SOLUTION EPIDURAL; INFILTRATION; INTRACAUDAL; PERINEURAL AS NEEDED
Status: DISCONTINUED | OUTPATIENT
Start: 2018-12-31 | End: 2018-12-31 | Stop reason: SURG

## 2018-12-31 RX ORDER — HYDROCODONE BITARTRATE AND ACETAMINOPHEN 5; 325 MG/1; MG/1
2 TABLET ORAL AS NEEDED
Status: DISCONTINUED | OUTPATIENT
Start: 2018-12-31 | End: 2018-12-31 | Stop reason: HOSPADM

## 2018-12-31 RX ORDER — ROCURONIUM BROMIDE 10 MG/ML
INJECTION, SOLUTION INTRAVENOUS AS NEEDED
Status: DISCONTINUED | OUTPATIENT
Start: 2018-12-31 | End: 2018-12-31 | Stop reason: SURG

## 2018-12-31 RX ORDER — SODIUM CHLORIDE 9 MG/ML
INJECTION, SOLUTION INTRAVENOUS ONCE
Status: COMPLETED | OUTPATIENT
Start: 2018-12-31 | End: 2018-12-31

## 2018-12-31 RX ORDER — METOCLOPRAMIDE 10 MG/1
10 TABLET ORAL ONCE
Status: COMPLETED | OUTPATIENT
Start: 2018-12-31 | End: 2018-12-31

## 2018-12-31 RX ORDER — SODIUM CHLORIDE, SODIUM LACTATE, POTASSIUM CHLORIDE, CALCIUM CHLORIDE 600; 310; 30; 20 MG/100ML; MG/100ML; MG/100ML; MG/100ML
INJECTION, SOLUTION INTRAVENOUS CONTINUOUS
Status: DISCONTINUED | OUTPATIENT
Start: 2018-12-31 | End: 2019-01-02

## 2018-12-31 RX ORDER — METOPROLOL TARTRATE 5 MG/5ML
2.5 INJECTION INTRAVENOUS ONCE
Status: DISCONTINUED | OUTPATIENT
Start: 2018-12-31 | End: 2018-12-31 | Stop reason: HOSPADM

## 2018-12-31 RX ORDER — HYDROCODONE BITARTRATE AND ACETAMINOPHEN 5; 325 MG/1; MG/1
1 TABLET ORAL AS NEEDED
Status: DISCONTINUED | OUTPATIENT
Start: 2018-12-31 | End: 2018-12-31 | Stop reason: HOSPADM

## 2018-12-31 RX ORDER — CEFAZOLIN SODIUM/WATER 2 G/20 ML
SYRINGE (ML) INTRAVENOUS AS NEEDED
Status: DISCONTINUED | OUTPATIENT
Start: 2018-12-31 | End: 2018-12-31 | Stop reason: SURG

## 2018-12-31 RX ORDER — SODIUM CHLORIDE 0.9 % (FLUSH) 0.9 %
10 SYRINGE (ML) INJECTION AS NEEDED
Status: DISCONTINUED | OUTPATIENT
Start: 2018-12-31 | End: 2019-01-02

## 2018-12-31 RX ORDER — ACETAMINOPHEN 325 MG/1
650 TABLET ORAL ONCE
Status: DISCONTINUED | OUTPATIENT
Start: 2018-12-31 | End: 2018-12-31

## 2018-12-31 RX ORDER — MORPHINE SULFATE 4 MG/ML
4 INJECTION, SOLUTION INTRAMUSCULAR; INTRAVENOUS EVERY 10 MIN PRN
Status: DISCONTINUED | OUTPATIENT
Start: 2018-12-31 | End: 2018-12-31 | Stop reason: HOSPADM

## 2018-12-31 RX ORDER — ACETAMINOPHEN 500 MG
1000 TABLET ORAL ONCE
Status: COMPLETED | OUTPATIENT
Start: 2018-12-31 | End: 2018-12-31

## 2018-12-31 RX ORDER — MAGNESIUM HYDROXIDE 1200 MG/15ML
LIQUID ORAL CONTINUOUS PRN
Status: DISCONTINUED | OUTPATIENT
Start: 2018-12-31 | End: 2018-12-31 | Stop reason: HOSPADM

## 2018-12-31 RX ORDER — ENOXAPARIN SODIUM 100 MG/ML
40 INJECTION SUBCUTANEOUS NIGHTLY
Status: DISCONTINUED | OUTPATIENT
Start: 2018-12-31 | End: 2019-01-08

## 2018-12-31 RX ORDER — PHENYLEPHRINE HCL 10 MG/ML
VIAL (ML) INJECTION AS NEEDED
Status: DISCONTINUED | OUTPATIENT
Start: 2018-12-31 | End: 2018-12-31 | Stop reason: SURG

## 2018-12-31 RX ORDER — ONDANSETRON 2 MG/ML
INJECTION INTRAMUSCULAR; INTRAVENOUS AS NEEDED
Status: DISCONTINUED | OUTPATIENT
Start: 2018-12-31 | End: 2018-12-31 | Stop reason: SURG

## 2018-12-31 RX ORDER — SODIUM CHLORIDE, SODIUM LACTATE, POTASSIUM CHLORIDE, CALCIUM CHLORIDE 600; 310; 30; 20 MG/100ML; MG/100ML; MG/100ML; MG/100ML
INJECTION, SOLUTION INTRAVENOUS CONTINUOUS
Status: DISCONTINUED | OUTPATIENT
Start: 2018-12-31 | End: 2018-12-31 | Stop reason: HOSPADM

## 2018-12-31 RX ORDER — MORPHINE SULFATE 10 MG/ML
6 INJECTION, SOLUTION INTRAMUSCULAR; INTRAVENOUS EVERY 10 MIN PRN
Status: DISCONTINUED | OUTPATIENT
Start: 2018-12-31 | End: 2018-12-31 | Stop reason: HOSPADM

## 2018-12-31 RX ORDER — EPHEDRINE SULFATE 50 MG/ML
INJECTION, SOLUTION INTRAVENOUS AS NEEDED
Status: DISCONTINUED | OUTPATIENT
Start: 2018-12-31 | End: 2018-12-31 | Stop reason: SURG

## 2018-12-31 RX ORDER — MORPHINE SULFATE 2 MG/ML
2 INJECTION, SOLUTION INTRAMUSCULAR; INTRAVENOUS EVERY 10 MIN PRN
Status: DISCONTINUED | OUTPATIENT
Start: 2018-12-31 | End: 2018-12-31 | Stop reason: HOSPADM

## 2018-12-31 RX ORDER — HALOPERIDOL 5 MG/ML
0.25 INJECTION INTRAMUSCULAR ONCE AS NEEDED
Status: DISCONTINUED | OUTPATIENT
Start: 2018-12-31 | End: 2018-12-31 | Stop reason: HOSPADM

## 2018-12-31 RX ORDER — SODIUM CHLORIDE 9 MG/ML
INJECTION, SOLUTION INTRAVENOUS
Status: COMPLETED
Start: 2018-12-31 | End: 2018-12-31

## 2018-12-31 RX ORDER — NALOXONE HYDROCHLORIDE 0.4 MG/ML
80 INJECTION, SOLUTION INTRAMUSCULAR; INTRAVENOUS; SUBCUTANEOUS AS NEEDED
Status: DISCONTINUED | OUTPATIENT
Start: 2018-12-31 | End: 2018-12-31 | Stop reason: HOSPADM

## 2018-12-31 RX ORDER — TOBRAMYCIN 1.2 G/30ML
2.4 INJECTION, POWDER, LYOPHILIZED, FOR SOLUTION INTRAVENOUS ONCE
Status: COMPLETED | OUTPATIENT
Start: 2018-12-31 | End: 2018-12-31

## 2018-12-31 RX ORDER — ONDANSETRON 2 MG/ML
4 INJECTION INTRAMUSCULAR; INTRAVENOUS ONCE AS NEEDED
Status: DISCONTINUED | OUTPATIENT
Start: 2018-12-31 | End: 2018-12-31 | Stop reason: HOSPADM

## 2018-12-31 RX ORDER — VANCOMYCIN HYDROCHLORIDE 1 G/20ML
INJECTION, POWDER, LYOPHILIZED, FOR SOLUTION INTRAVENOUS AS NEEDED
Status: DISCONTINUED | OUTPATIENT
Start: 2018-12-31 | End: 2018-12-31 | Stop reason: HOSPADM

## 2018-12-31 RX ADMIN — SODIUM CHLORIDE: 9 INJECTION, SOLUTION INTRAVENOUS at 16:56:00

## 2018-12-31 RX ADMIN — LIDOCAINE HYDROCHLORIDE 50 MG: 10 INJECTION, SOLUTION EPIDURAL; INFILTRATION; INTRACAUDAL; PERINEURAL at 12:37:00

## 2018-12-31 RX ADMIN — CEFAZOLIN SODIUM/WATER 2 G: 2 G/20 ML SYRINGE (ML) INTRAVENOUS at 17:28:00

## 2018-12-31 RX ADMIN — ALBUMIN, HUMAN INJ 5%: 5 SOLUTION INTRAVENOUS at 15:57:00

## 2018-12-31 RX ADMIN — PHENYLEPHRINE HCL 100 MCG: 10 MG/ML VIAL (ML) INJECTION at 13:57:00

## 2018-12-31 RX ADMIN — EPHEDRINE SULFATE 5 MG: 50 INJECTION, SOLUTION INTRAVENOUS at 13:57:00

## 2018-12-31 RX ADMIN — SODIUM CHLORIDE: 9 INJECTION, SOLUTION INTRAVENOUS at 15:14:00

## 2018-12-31 RX ADMIN — SODIUM CHLORIDE: 9 INJECTION, SOLUTION INTRAVENOUS at 12:48:00

## 2018-12-31 RX ADMIN — SODIUM CHLORIDE, SODIUM LACTATE, POTASSIUM CHLORIDE, CALCIUM CHLORIDE: 600; 310; 30; 20 INJECTION, SOLUTION INTRAVENOUS at 12:29:00

## 2018-12-31 RX ADMIN — PHENYLEPHRINE HCL 100 MCG: 10 MG/ML VIAL (ML) INJECTION at 16:22:00

## 2018-12-31 RX ADMIN — ROCURONIUM BROMIDE 20 MG: 10 INJECTION, SOLUTION INTRAVENOUS at 13:25:00

## 2018-12-31 RX ADMIN — PHENYLEPHRINE HCL 100 MCG: 10 MG/ML VIAL (ML) INJECTION at 15:50:00

## 2018-12-31 RX ADMIN — CEFAZOLIN SODIUM/WATER 2 G: 2 G/20 ML SYRINGE (ML) INTRAVENOUS at 13:28:00

## 2018-12-31 RX ADMIN — SODIUM CHLORIDE: 9 INJECTION, SOLUTION INTRAVENOUS at 17:06:00

## 2018-12-31 RX ADMIN — PHENYLEPHRINE HCL 100 MCG: 10 MG/ML VIAL (ML) INJECTION at 16:38:00

## 2018-12-31 RX ADMIN — PHENYLEPHRINE HCL 100 MCG: 10 MG/ML VIAL (ML) INJECTION at 16:47:00

## 2018-12-31 RX ADMIN — PHENYLEPHRINE HCL 100 MCG: 10 MG/ML VIAL (ML) INJECTION at 15:40:00

## 2018-12-31 RX ADMIN — SODIUM CHLORIDE, SODIUM LACTATE, POTASSIUM CHLORIDE, CALCIUM CHLORIDE: 600; 310; 30; 20 INJECTION, SOLUTION INTRAVENOUS at 15:56:00

## 2018-12-31 RX ADMIN — ONDANSETRON 4 MG: 2 INJECTION INTRAMUSCULAR; INTRAVENOUS at 16:57:00

## 2018-12-31 RX ADMIN — PHENYLEPHRINE HCL 100 MCG: 10 MG/ML VIAL (ML) INJECTION at 16:04:00

## 2018-12-31 RX ADMIN — ROCURONIUM BROMIDE 20 MG: 10 INJECTION, SOLUTION INTRAVENOUS at 12:43:00

## 2018-12-31 RX ADMIN — SODIUM CHLORIDE, SODIUM LACTATE, POTASSIUM CHLORIDE, CALCIUM CHLORIDE: 600; 310; 30; 20 INJECTION, SOLUTION INTRAVENOUS at 14:52:00

## 2018-12-31 RX ADMIN — SODIUM CHLORIDE: 9 INJECTION, SOLUTION INTRAVENOUS at 14:52:00

## 2018-12-31 RX ADMIN — PHENYLEPHRINE HCL 100 MCG: 10 MG/ML VIAL (ML) INJECTION at 16:31:00

## 2018-12-31 RX ADMIN — ALBUMIN, HUMAN INJ 5%: 5 SOLUTION INTRAVENOUS at 13:53:00

## 2018-12-31 NOTE — PROGRESS NOTES
[facility] Hospitalist Progress Note     CC: Hospital Follow up    PCP: Nya Bowie MD       Assessment/Plan:     Active Problems:    Wound infection    Mr. Helane Lundborg is a 68 yo male [address] [employer] sig for bladder ca s/p chemo, BPH, CKD 3, CAD, depression, HTN, HLD neris floyd on 11/23/18 at [facility] cardiology institute, and was cleared for surgery   - recent ECHO done prior to surgery: ECHO 12/6/18  EF 50-55%, LVH mild, no sig valvular abnormalities, diastolic dys, mild MR, TR  - this would be patient 3rd surgery th Diet: reg     DVT Prophy: lovneox  Atrophy: IS  Lines: PIV     Dispo: pending clinical course    Questions/concerns were discussed with patient and/or family by bedside.     Thank Clark Fernandez MD    [facility] Hospitalist     Subjective:     Feeling better, sti 39*  44*  51*   CA  8.4*  8.6  8.5   NA  140  140  141   K  4.0  4.0  4.0   CL  109  110  111*   CO2  21*  23  26       Recent Labs   Lab  12/28/18   2241   ALT  12*   AST  15   ALB  1.9*         Imaging:          Meds:     • clonidine/epinephrine/ropivaca

## 2018-12-31 NOTE — BRIEF OP NOTE
Pre-Operative Diagnosis: Infected prosthesis of right hip ([facility]) [T84.51XA]     Post-Operative Diagnosis: Infected prosthesis of right hip ([address] [T84.51XA]      Procedure Performed:   Procedure(s):  EXPLANT RIGHT TOTAL HIP ARTHROPLASTY WITH PLACEMENT OF ANT

## 2018-12-31 NOTE — PROGRESS NOTES
[facility] AND [facility] Infectious Disease  Progress Note    Meghan Brooks Patient Status:  Inpatient    1940 MRN [id number]   Location [facility] 4W/SW/SE Attending Abhilash Foss MD   Hosp Day # 3 PCP Juaquin Tinsley MD     Subjective:  Pa vancomycin and meropenem     2.  PVD with healing scabs noted to his foot  - No infection     3.  MRSA carriage swab negative     4.  Elevated CRP  - Will trend    5. Disposition - inpatient.   Continue IV vancomycin and meropenem perioperatively and plan

## 2018-12-31 NOTE — CONSULTS
Cardiology Consultation  [facility] Patient Status:  Inpatient    1940 MRN [id number]   Location [facility] 4W/SW/SE Attending Jan Almendarez MD   Hosp Day # 3 PCP Monica Argueta MD     Reason for Consultation: [address] [facility] MAIN OR   • SPINE SURGERY PROCEDURE UNLISTED      fusion   • TOTAL HIP REPLACEMENT Right 98, 99   • TOTAL HIP REPLACEMENT Left 98, 2014     Family History   Problem Relation Age of Onset   • Hypertension Father    • Heart Disorder Father    • Heart Dis escitalopram 20 MG Oral Tab Take 1 tablet (20 mg total) by mouth daily. Disp: 90 tablet Rfl: 3   Metoprolol Succinate ER 25 MG Oral Tablet 24 Hr Take 1 tablet by mouth daily. Disp:  Rfl: 3   Pravastatin Sodium 20 MG Oral Tab Take 20 mg by mouth daily.  Steve Drake Echo:   12/6/18  EF 50-55%, LVH mild, no sig valvular abnormalities, diastolic dys, mild MR, TR    Stress Test 12/2017 reportedly normal    Labs:   CBC:    Lab Results   Component Value Date    WBC 6.0 12/31/2018    WBC 7.0 12/30/2018    WBC 6.6 12/29/ at the bedside as well as with hospitalist service.   However, patient appears overall compensated and asymptomatic, thus no further cardiac workup is indicated prior to planned surgery  - Cont statin, beta blocker, arb  - Resume asa, plavix when safe from

## 2018-12-31 NOTE — WOUND PROGRESS NOTE
WOUND CARE NOTE      PLAN   Recommendations:  Dietary consult for recommendations for nutrition to optimize wound healing  Heels elevated using pillows, heel wedge or heel boots to offload heels  Use of lift equipment  To prevent sliding: decrease head o Component Value Date    WBC 6.0 12/31/2018    HGB 7.5 (L) 12/31/2018    HCT 23.0 (L) 12/31/2018     12/31/2018    CREATSERUM 1.33 12/31/2018    BUN 24 (H) 12/31/2018     12/31/2018    K 4.0 12/31/2018     (H) 12/31/2018    CO2 26 12/31

## 2018-12-31 NOTE — ANESTHESIA PROCEDURE NOTES
Peripheral IV  Inserted by: Derian Cantrell MD    Placement  Laterality: right  Location: forearm  Site prep: alcohol

## 2018-12-31 NOTE — ANESTHESIA POSTPROCEDURE EVALUATION
Patient: Jeanna Lee    Procedure Summary     Date:  12/31/18 Room / Location:  [facility] OR  / [facility] OR    Anesthesia Start:  8223 Anesthesia Stop:      Procedure:  HIP TOTAL ARTHROPLASTY REVISION (Right ) Diagnosis:       Infected prosthesis of

## 2018-12-31 NOTE — ANESTHESIA PROCEDURE NOTES
ANESTHESIA INTUBATION  Urgency: elective    Airway not difficult    General Information and Staff    Patient location during procedure: OR  Anesthesiologist: Beltran Guerra MD  Performed: anesthesiologist     Indications and Patient Condition  Indications

## 2018-12-31 NOTE — ANESTHESIA PROCEDURE NOTES
Peripheral IV  Inserted by: Capo Renee MD    Placement  Needle size: 18 G  Laterality: right  Location: hand  Site prep: alcohol

## 2018-12-31 NOTE — ANESTHESIA PREPROCEDURE EVALUATION
Anesthesia PreOp Note    HPI:     Ty Jovel is a 69year old male who presents for preoperative consultation requested by: An Bird DO    Date of Surgery: 12/28/2018 - 12/31/2018    Procedure(s):  HIP TOTAL ARTHROPLASTY REVISION  Indication: complication     DIFFICULTY WAKING UP AND CONFUSION   • Back problem     chronic back pain   • Bladder cancer (HCC)    • BPH (benign prostatic hyperplasia)    • CKD (chronic kidney disease) stage 3, GFR 30-59 ml/min (HCC)    • Coronary atherosclerosis    • 1 tablet (325 mg total) by mouth 2 (two) times daily.  Disp: 84 tablet Rfl: 0 12/28/2018 at 1700   omeprazole 20 MG Oral Capsule Delayed Release Take 40 mg by mouth every morning before breakfast. Disp:  Rfl:  12/28/2018 at 0700   QUEtiapine Fumarate 100 MG cap/tab 5 mg 5 mg Oral Q4H PRN Elias Gomez DO     morphINE sulfate (PF) 2 MG/ML injection 4 mg 4 mg Intravenous Q2H PRN Elias Gomez DO 2 mg at 12/31/18 0830    oxyCODONE HCl (OXY-IR) cap/tab 10 mg 10 mg Oral Q4H PRN Yung Garcia DO     TraMAD PRN Ivy Min MD     Meropenem (MERREM) 500 mg in sodium chloride 0.9% 100 mL  mg Intravenous Q12H Ivy Min MD Last Rate: 33.3 mL/hr at 12/31/18 0925 500 mg at 12/31/18 0925   Vancomycin HCl (VANCOCIN) 1.5 g in sodium chloride 0.9 % 500 mL IVPB 1 200 12/31/2018    MPV 8.1 12/31/2018     Lab Results   Component Value Date     12/31/2018    K 4.0 12/31/2018     (H) 12/31/2018    CO2 26 12/31/2018    BUN 24 (H) 12/31/2018    CREATSERUM 1.33 12/31/2018    GLU 98 12/31/2018    CA 8.5 12/31/2 complications, and any alternative forms of anesthetic management. All of the patient's questions were answered to the best of my ability. The patient desires the anesthetic management as planned.   BENJIE BLANTON  12/31/2018 11:58 AM

## 2018-12-31 NOTE — PROGRESS NOTES
12/30/18 1814   Clinical Encounter Type   Visited With Patient   Routine Visit Introduction   Continue Visiting Yes   Surgical Visit Pre-op   Patient Spiritual Encounters   Spiritual Assessment Completed No  (Pt said he's \"getting ready for surgery\" a

## 2018-12-31 NOTE — PROGRESS NOTES
[address] [place] [address] [address] Patient Status:  Inpatient    1940 MRN [id number]   Location [facility] 4W/SW/SE Attending Janeth Dubose MD   Hosp Day # 3 PCP Yesica Macias MD     Subjective:  Procedure(s):  [address] infection. We discussed again the bone loss that would likely occur at the potential functional deficits that could occur as a result of that including weakness and the need for ambulatory aids and only being able to walk short distances.   We discussed ri mL Intravenous PRN   acetaminophen (TYLENOL) tab 650 mg 650 mg Oral Q6H PRN   PEG 3350 (MIRALAX) powder packet 17 g 17 g Oral Daily PRN   magnesium hydroxide (MILK OF MAGNESIA) 400 MG/5ML suspension 30 mL 30 mL Oral Daily PRN   bisacodyl (DULCOLAX) rectal

## 2018-12-31 NOTE — PAYOR COMM NOTE
--------------  ADMISSION REVIEW     Payor: [employer] MEDICARE ADV PPO  Subscriber #:  [id number]  Authorization Number: [id number]    Admit date: 12/28/18  Admit time: 1948         [facility] Hospitalist H&P     CC: Infected right hip       History of Present Illne • HIP TOTAL ARTHROPLASTY REVISION Right 12/18/2018    Performed by Roselyn Montgomery DO at [facility] OR   • [address] Right 12/6/2018    Performed by Roselyn Montgomery DO at [facility] OR   • [address]      fusion Rfl:      OBJECTIVE:  /69 (BP Location: Right arm)   Pulse 87   Temp 98.1 °F (36.7 °C) (Oral)   Resp 18   Ht 5' 6\" (1.676 m)   Wt 234 lb 3.2 oz (106.2 kg)   SpO2 99%   BMI 37.80 kg/m²    General:  Alert, no distress   Head:  Normocephalic, atraumati plan for 6 weeks IV abx with invanz and plan on po suppression thereafter, prior OR cx with enterobacter aerogenes  - now with purulent drainage and fever  - BC at OSH 1/2 bottles with GPC, will follow  - IV vanc and meropenem ordered will continue  - on p 52-year-old man who underwent a right total hip replacement and, unfortunately, this was complicated by an infection. A culture grew Enterobacter and he underwent a poly exchange during his second admission.   He was discharged on Invanz at an extended car meropenem is reasonable by my view. 3.       The previous culture had Enterobacter and he has been on carbapenem for this so far. 12/30/18   Assessment/Plan:      Active Problems:    Wound infection     Mr. Kaci Ball is a 68 yo male [employer] sig CKD, infection, recent surgery with acute blood loss  -prior hgb 7.8-. 7.2 -> 7.8  -Type and screen, transfuse if less than 7      #SAM on CPAP  -cpap protocol  -encourage use with all sleep     #PAD (peripheral artery disease) (HCC)  -holding aspirin and 6.8  6.6  7.0   PLT  210  202  212                  Recent Labs   Lab  12/28/18   2241  12/29/18   0620  12/30/18   0534   GLU  126*  106*  103*   BUN  40*  34*  28*   CREATSERUM  1.94*  1.68*  1.50   GFRAA  37*  45*  51*   GFRNAA  32*  39*  44*   CA  8.0 with revision on 12/6  - #s/p I&D with poly and head exchange and antibiotic beads on 12/18, DC'ed on IV invanz  - ID followed last admit, PICC line placed 12/18, plan for 6 weeks IV abx with invanz and plan on po suppression thereafter, prior OR cx with e mild, no sig valvular abnormalities, diastolic dys, mild MR, TR     #LIN on CKD (chronic kidney disease) stage 3, GFR 30-59 ml/min ([facility])  -baseline creat 1.6, was 2.2 at OSH but improved to baseline with low rate IVF  -monitor stable today  -would avoid BI Pulm: Lungs clear, normal respiratory effort  CV: Heart with regular rate and rhythm,    Abd: Abdomen soft, nontender, nondistended,    MSK:right hip with long wound, edema noted  Skin: dry scaly skin all over, erythema noted in groin and arm pits  Neuro DAY:  acetaminophen (TYLENOL) tab 650 mg     Date Action Dose Route     12/30/2018 2320 Given 650 mg Oral     12/30/2018 1657 Given 650 mg Oral       acetaminophen (TYLENOL EXTRA STRENGTH) tab 1,000 mg     Date Action Dose Route     12/31/2018 1051 Given 1 12/30/2018 2320 Given 50 mg Oral     12/30/2018 1657 Given 50 mg Oral       Vancomycin HCl (VANCOCIN) 1.5 g in sodium chloride 0.9 % 500 mL IVPB     Date Action Dose Route     12/30/2018 1514 New Bag 1.5 g Intravenous

## 2018-12-31 NOTE — ANESTHESIA PROCEDURE NOTES
Arterial Line  Performed by: Kinza Ross MD  Authorized by: Kinza Ross MD     Patient Location:  OR  Indication: continuous blood pressure monitoring and blood sampling needed    Anesthesiologist:  Kinza Ross MD  Performed By:  Anesthesiologis

## 2019-01-01 ENCOUNTER — APPOINTMENT (OUTPATIENT)
Dept: GENERAL RADIOLOGY | Facility: HOSPITAL | Age: 79
DRG: 536 | End: 2019-01-01
Attending: HOSPITALIST
Payer: MEDICARE

## 2019-01-01 ENCOUNTER — APPOINTMENT (OUTPATIENT)
Dept: ULTRASOUND IMAGING | Facility: HOSPITAL | Age: 79
DRG: 536 | End: 2019-01-01
Attending: HOSPITALIST
Payer: MEDICARE

## 2019-01-01 ENCOUNTER — HOSPITAL ENCOUNTER (INPATIENT)
Facility: HOSPITAL | Age: 79
LOS: 4 days | Discharge: NURSING, REHAB OR LONG-TERM CARE | DRG: 536 | End: 2019-01-01
Attending: HOSPITALIST | Admitting: HOSPITALIST
Payer: MEDICARE

## 2019-01-01 ENCOUNTER — APPOINTMENT (OUTPATIENT)
Dept: PICC SERVICES | Facility: HOSPITAL | Age: 79
DRG: 536 | End: 2019-01-01
Attending: HOSPITALIST
Payer: MEDICARE

## 2019-01-01 ENCOUNTER — APPOINTMENT (OUTPATIENT)
Dept: CT IMAGING | Facility: HOSPITAL | Age: 79
DRG: 536 | End: 2019-01-01
Attending: HOSPITALIST
Payer: MEDICARE

## 2019-01-01 VITALS
RESPIRATION RATE: 16 BRPM | WEIGHT: 233.69 LBS | TEMPERATURE: 98 F | OXYGEN SATURATION: 100 % | BODY MASS INDEX: 38 KG/M2 | SYSTOLIC BLOOD PRESSURE: 136 MMHG | HEART RATE: 80 BPM | DIASTOLIC BLOOD PRESSURE: 66 MMHG

## 2019-01-01 LAB
25(OH)D3 SERPL-MCNC: 16.5 NG/ML (ref 30–100)
ANION GAP SERPL CALC-SCNC: 5 MMOL/L (ref 0–18)
ANION GAP SERPL CALC-SCNC: 6 MMOL/L (ref 0–18)
ANION GAP SERPL CALC-SCNC: 7 MMOL/L (ref 0–18)
ANION GAP SERPL CALC-SCNC: 7 MMOL/L (ref 0–18)
ANION GAP SERPL CALC-SCNC: 8 MMOL/L (ref 0–18)
BASOPHILS # BLD AUTO: 0.01 X10(3) UL (ref 0–0.2)
BASOPHILS # BLD AUTO: 0.01 X10(3) UL (ref 0–0.2)
BASOPHILS NFR BLD AUTO: 0.2 %
BASOPHILS NFR BLD AUTO: 0.2 %
BUN BLD-MCNC: 37 MG/DL (ref 7–18)
BUN BLD-MCNC: 39 MG/DL (ref 7–18)
BUN BLD-MCNC: 42 MG/DL (ref 7–18)
BUN BLD-MCNC: 45 MG/DL (ref 7–18)
BUN BLD-MCNC: 53 MG/DL (ref 7–18)
BUN/CREAT SERPL: 28.2 (ref 10–20)
BUN/CREAT SERPL: 30 (ref 10–20)
BUN/CREAT SERPL: 31.6 (ref 10–20)
BUN/CREAT SERPL: 33.3 (ref 10–20)
BUN/CREAT SERPL: 37.9 (ref 10–20)
CALCIUM BLD-MCNC: 8.6 MG/DL (ref 8.5–10.1)
CALCIUM BLD-MCNC: 9 MG/DL (ref 8.5–10.1)
CALCIUM BLD-MCNC: 9.1 MG/DL (ref 8.5–10.1)
CALCIUM BLD-MCNC: 9.2 MG/DL (ref 8.5–10.1)
CALCIUM BLD-MCNC: 9.6 MG/DL (ref 8.5–10.1)
CHLORIDE SERPL-SCNC: 110 MMOL/L (ref 98–107)
CHLORIDE SERPL-SCNC: 110 MMOL/L (ref 98–107)
CHLORIDE SERPL-SCNC: 111 MMOL/L (ref 98–107)
CHLORIDE SERPL-SCNC: 111 MMOL/L (ref 98–107)
CHLORIDE SERPL-SCNC: 112 MMOL/L (ref 98–107)
CO2 SERPL-SCNC: 22 MMOL/L (ref 21–32)
CO2 SERPL-SCNC: 24 MMOL/L (ref 21–32)
CO2 SERPL-SCNC: 24 MMOL/L (ref 21–32)
CO2 SERPL-SCNC: 25 MMOL/L (ref 21–32)
CO2 SERPL-SCNC: 26 MMOL/L (ref 21–32)
CREAT BLD-MCNC: 1.17 MG/DL (ref 0.7–1.3)
CREAT BLD-MCNC: 1.3 MG/DL (ref 0.7–1.3)
CREAT BLD-MCNC: 1.35 MG/DL (ref 0.7–1.3)
CREAT BLD-MCNC: 1.4 MG/DL (ref 0.7–1.3)
CREAT BLD-MCNC: 1.49 MG/DL (ref 0.7–1.3)
DEPRECATED RDW RBC AUTO: 55.2 FL (ref 35.1–46.3)
DEPRECATED RDW RBC AUTO: 55.7 FL (ref 35.1–46.3)
DEPRECATED RDW RBC AUTO: 55.7 FL (ref 35.1–46.3)
DEPRECATED RDW RBC AUTO: 56.8 FL (ref 35.1–46.3)
DEPRECATED RDW RBC AUTO: 57.5 FL (ref 35.1–46.3)
EOSINOPHIL # BLD AUTO: 0.36 X10(3) UL (ref 0–0.7)
EOSINOPHIL # BLD AUTO: 0.44 X10(3) UL (ref 0–0.7)
EOSINOPHIL NFR BLD AUTO: 7.9 %
EOSINOPHIL NFR BLD AUTO: 9.3 %
ERYTHROCYTE [DISTWIDTH] IN BLOOD BY AUTOMATED COUNT: 17.2 % (ref 11–15)
ERYTHROCYTE [DISTWIDTH] IN BLOOD BY AUTOMATED COUNT: 17.2 % (ref 11–15)
ERYTHROCYTE [DISTWIDTH] IN BLOOD BY AUTOMATED COUNT: 17.3 % (ref 11–15)
ERYTHROCYTE [DISTWIDTH] IN BLOOD BY AUTOMATED COUNT: 17.4 % (ref 11–15)
ERYTHROCYTE [DISTWIDTH] IN BLOOD BY AUTOMATED COUNT: 17.4 % (ref 11–15)
GLUCOSE BLD-MCNC: 103 MG/DL (ref 70–99)
GLUCOSE BLD-MCNC: 144 MG/DL (ref 70–99)
GLUCOSE BLD-MCNC: 87 MG/DL (ref 70–99)
GLUCOSE BLD-MCNC: 88 MG/DL (ref 70–99)
GLUCOSE BLD-MCNC: 91 MG/DL (ref 70–99)
HCT VFR BLD AUTO: 25.2 % (ref 39–53)
HCT VFR BLD AUTO: 25.6 % (ref 39–53)
HCT VFR BLD AUTO: 25.9 % (ref 39–53)
HCT VFR BLD AUTO: 26.2 % (ref 39–53)
HCT VFR BLD AUTO: 28 % (ref 39–53)
HGB BLD-MCNC: 7.7 G/DL (ref 13–17.5)
HGB BLD-MCNC: 7.8 G/DL (ref 13–17.5)
HGB BLD-MCNC: 7.9 G/DL (ref 13–17.5)
HGB BLD-MCNC: 8 G/DL (ref 13–17.5)
HGB BLD-MCNC: 8.4 G/DL (ref 13–17.5)
IMM GRANULOCYTES # BLD AUTO: 0.03 X10(3) UL (ref 0–1)
IMM GRANULOCYTES # BLD AUTO: 0.04 X10(3) UL (ref 0–1)
IMM GRANULOCYTES NFR BLD: 0.7 %
IMM GRANULOCYTES NFR BLD: 0.8 %
LYMPHOCYTES # BLD AUTO: 0.73 X10(3) UL (ref 1–4)
LYMPHOCYTES # BLD AUTO: 0.73 X10(3) UL (ref 1–4)
LYMPHOCYTES NFR BLD AUTO: 15.5 %
LYMPHOCYTES NFR BLD AUTO: 16.1 %
MCH RBC QN AUTO: 27.3 PG (ref 26–34)
MCH RBC QN AUTO: 27.4 PG (ref 26–34)
MCH RBC QN AUTO: 27.5 PG (ref 26–34)
MCH RBC QN AUTO: 27.5 PG (ref 26–34)
MCH RBC QN AUTO: 27.9 PG (ref 26–34)
MCHC RBC AUTO-ENTMCNC: 30 G/DL (ref 31–37)
MCHC RBC AUTO-ENTMCNC: 30.1 G/DL (ref 31–37)
MCHC RBC AUTO-ENTMCNC: 30.5 G/DL (ref 31–37)
MCHC RBC AUTO-ENTMCNC: 30.6 G/DL (ref 31–37)
MCHC RBC AUTO-ENTMCNC: 30.9 G/DL (ref 31–37)
MCV RBC AUTO: 89.4 FL (ref 80–100)
MCV RBC AUTO: 89.7 FL (ref 80–100)
MCV RBC AUTO: 90.5 FL (ref 80–100)
MCV RBC AUTO: 91.2 FL (ref 80–100)
MCV RBC AUTO: 91.8 FL (ref 80–100)
MONOCYTES # BLD AUTO: 0.49 X10(3) UL (ref 0.1–1)
MONOCYTES # BLD AUTO: 0.55 X10(3) UL (ref 0.1–1)
MONOCYTES NFR BLD AUTO: 10.4 %
MONOCYTES NFR BLD AUTO: 12.1 %
NEUTROPHILS # BLD AUTO: 2.85 X10 (3) UL (ref 1.5–7.7)
NEUTROPHILS # BLD AUTO: 2.85 X10(3) UL (ref 1.5–7.7)
NEUTROPHILS # BLD AUTO: 3 X10 (3) UL (ref 1.5–7.7)
NEUTROPHILS # BLD AUTO: 3 X10(3) UL (ref 1.5–7.7)
NEUTROPHILS NFR BLD AUTO: 63 %
NEUTROPHILS NFR BLD AUTO: 63.8 %
OSMOLALITY SERPL CALC.SUM OF ELEC: 298 MOSM/KG (ref 275–295)
OSMOLALITY SERPL CALC.SUM OF ELEC: 304 MOSM/KG (ref 275–295)
OSMOLALITY SERPL CALC.SUM OF ELEC: 305 MOSM/KG (ref 275–295)
OSMOLALITY SERPL CALC.SUM OF ELEC: 307 MOSM/KG (ref 275–295)
OSMOLALITY SERPL CALC.SUM OF ELEC: 308 MOSM/KG (ref 275–295)
PLATELET # BLD AUTO: 131 10(3)UL (ref 150–450)
PLATELET # BLD AUTO: 131 10(3)UL (ref 150–450)
PLATELET # BLD AUTO: 136 10(3)UL (ref 150–450)
PLATELET # BLD AUTO: 138 10(3)UL (ref 150–450)
PLATELET # BLD AUTO: 149 10(3)UL (ref 150–450)
POTASSIUM SERPL-SCNC: 4.1 MMOL/L (ref 3.5–5.1)
POTASSIUM SERPL-SCNC: 4.3 MMOL/L (ref 3.5–5.1)
POTASSIUM SERPL-SCNC: 4.4 MMOL/L (ref 3.5–5.1)
POTASSIUM SERPL-SCNC: 4.5 MMOL/L (ref 3.5–5.1)
POTASSIUM SERPL-SCNC: 4.5 MMOL/L (ref 3.5–5.1)
RBC # BLD AUTO: 2.82 X10(6)UL (ref 3.8–5.8)
RBC # BLD AUTO: 2.83 X10(6)UL (ref 3.8–5.8)
RBC # BLD AUTO: 2.84 X10(6)UL (ref 3.8–5.8)
RBC # BLD AUTO: 2.92 X10(6)UL (ref 3.8–5.8)
RBC # BLD AUTO: 3.05 X10(6)UL (ref 3.8–5.8)
SODIUM SERPL-SCNC: 140 MMOL/L (ref 136–145)
SODIUM SERPL-SCNC: 140 MMOL/L (ref 136–145)
SODIUM SERPL-SCNC: 142 MMOL/L (ref 136–145)
SODIUM SERPL-SCNC: 143 MMOL/L (ref 136–145)
SODIUM SERPL-SCNC: 143 MMOL/L (ref 136–145)
VANCOMYCIN TROUGH SERPL-MCNC: 17.8 UG/ML (ref 10–20)
VANCOMYCIN TROUGH SERPL-MCNC: 20.2 UG/ML (ref 10–20)
WBC # BLD AUTO: 4.1 X10(3) UL (ref 4–11)
WBC # BLD AUTO: 4.4 X10(3) UL (ref 4–11)
WBC # BLD AUTO: 4.5 X10(3) UL (ref 4–11)
WBC # BLD AUTO: 4.5 X10(3) UL (ref 4–11)
WBC # BLD AUTO: 4.7 X10(3) UL (ref 4–11)

## 2019-01-01 PROCEDURE — 73700 CT LOWER EXTREMITY W/O DYE: CPT | Performed by: HOSPITALIST

## 2019-01-01 PROCEDURE — 93971 EXTREMITY STUDY: CPT | Performed by: HOSPITALIST

## 2019-01-01 PROCEDURE — 99223 1ST HOSP IP/OBS HIGH 75: CPT | Performed by: HOSPITALIST

## 2019-01-01 PROCEDURE — 71045 X-RAY EXAM CHEST 1 VIEW: CPT | Performed by: HOSPITALIST

## 2019-01-01 PROCEDURE — 02HV33Z INSERTION OF INFUSION DEVICE INTO SUPERIOR VENA CAVA, PERCUTANEOUS APPROACH: ICD-10-PCS | Performed by: HOSPITALIST

## 2019-01-01 RX ORDER — SODIUM CHLORIDE 9 MG/ML
INJECTION, SOLUTION INTRAVENOUS CONTINUOUS
Status: DISCONTINUED | OUTPATIENT
Start: 2019-01-01 | End: 2019-01-01

## 2019-01-01 RX ORDER — MAGNESIUM SULFATE HEPTAHYDRATE 40 MG/ML
2 INJECTION, SOLUTION INTRAVENOUS ONCE
Status: COMPLETED | OUTPATIENT
Start: 2019-01-01 | End: 2019-01-01

## 2019-01-01 RX ORDER — HYDROCODONE BITARTRATE AND ACETAMINOPHEN 5; 325 MG/1; MG/1
1 TABLET ORAL EVERY 4 HOURS PRN
Status: DISCONTINUED | OUTPATIENT
Start: 2019-01-01 | End: 2019-01-01

## 2019-01-01 RX ORDER — LIDOCAINE 4 G/G
PATCH TOPICAL DAILY
Status: DISCONTINUED | OUTPATIENT
Start: 2019-01-01 | End: 2019-01-01

## 2019-01-01 RX ORDER — SODIUM CHLORIDE 0.9 % (FLUSH) 0.9 %
10 SYRINGE (ML) INJECTION AS NEEDED
Status: DISCONTINUED | OUTPATIENT
Start: 2019-01-01 | End: 2019-01-01

## 2019-01-01 RX ORDER — TROLAMINE SALICYLATE 10 G/100G
CREAM TOPICAL 4 TIMES DAILY
Status: DISCONTINUED | OUTPATIENT
Start: 2019-01-01 | End: 2019-01-01

## 2019-01-01 RX ORDER — MELATONIN
325 2 TIMES DAILY WITH MEALS
Status: DISCONTINUED | OUTPATIENT
Start: 2019-01-01 | End: 2019-01-01

## 2019-01-01 RX ORDER — ASPIRIN 81 MG/1
81 TABLET ORAL DAILY
Status: DISCONTINUED | OUTPATIENT
Start: 2019-01-01 | End: 2019-01-01

## 2019-01-01 RX ORDER — MORPHINE SULFATE 2 MG/ML
2 INJECTION, SOLUTION INTRAMUSCULAR; INTRAVENOUS EVERY 2 HOUR PRN
Status: DISCONTINUED | OUTPATIENT
Start: 2019-01-01 | End: 2019-01-01

## 2019-01-01 RX ORDER — ALLOPURINOL 300 MG/1
300 TABLET ORAL DAILY
Status: DISCONTINUED | OUTPATIENT
Start: 2019-01-01 | End: 2019-01-01

## 2019-01-01 RX ORDER — DOCUSATE SODIUM 100 MG/1
100 CAPSULE, LIQUID FILLED ORAL 2 TIMES DAILY
Status: DISCONTINUED | OUTPATIENT
Start: 2019-01-01 | End: 2019-01-01

## 2019-01-01 RX ORDER — HYDROCODONE BITARTRATE AND ACETAMINOPHEN 5; 325 MG/1; MG/1
1 TABLET ORAL EVERY 4 HOURS PRN
Qty: 10 TABLET | Refills: 0 | Status: SHIPPED | OUTPATIENT
Start: 2019-01-01 | End: 2019-01-01

## 2019-01-01 RX ORDER — SODIUM CHLORIDE 9 MG/ML
INJECTION, SOLUTION INTRAVENOUS
Status: COMPLETED
Start: 2019-01-01 | End: 2019-01-01

## 2019-01-01 RX ORDER — LOSARTAN POTASSIUM 25 MG/1
25 TABLET ORAL DAILY
Status: DISCONTINUED | OUTPATIENT
Start: 2019-01-01 | End: 2019-01-01

## 2019-01-01 RX ORDER — ENOXAPARIN SODIUM 100 MG/ML
40 INJECTION SUBCUTANEOUS DAILY
Qty: 11.2 ML | Refills: 0 | Status: SHIPPED | OUTPATIENT
Start: 2019-01-01 | End: 2019-01-02

## 2019-01-01 RX ORDER — OXYCODONE HYDROCHLORIDE 5 MG/1
5 TABLET ORAL EVERY 4 HOURS PRN
Status: DISCONTINUED | OUTPATIENT
Start: 2019-01-01 | End: 2019-01-01

## 2019-01-01 RX ORDER — TRAMADOL HYDROCHLORIDE 50 MG/1
50 TABLET ORAL EVERY 4 HOURS PRN
Qty: 10 TABLET | Refills: 0 | Status: SHIPPED | OUTPATIENT
Start: 2019-01-01

## 2019-01-01 RX ORDER — ONDANSETRON 2 MG/ML
4 INJECTION INTRAMUSCULAR; INTRAVENOUS EVERY 6 HOURS PRN
Status: DISCONTINUED | OUTPATIENT
Start: 2019-01-01 | End: 2019-01-01

## 2019-01-01 RX ORDER — LIDOCAINE HYDROCHLORIDE 10 MG/ML
0.5 INJECTION, SOLUTION INFILTRATION; PERINEURAL ONCE AS NEEDED
Status: ACTIVE | OUTPATIENT
Start: 2019-01-01 | End: 2019-01-01

## 2019-01-01 RX ORDER — ACETAMINOPHEN AND CODEINE PHOSPHATE 300; 30 MG/1; MG/1
1 TABLET ORAL EVERY 4 HOURS PRN
Status: DISCONTINUED | OUTPATIENT
Start: 2019-01-01 | End: 2019-01-01

## 2019-01-01 RX ORDER — ACETAMINOPHEN 325 MG/1
650 TABLET ORAL EVERY 4 HOURS PRN
Status: DISCONTINUED | OUTPATIENT
Start: 2019-01-01 | End: 2019-01-01

## 2019-01-01 RX ORDER — CIPROFLOXACIN 500 MG/1
500 TABLET, FILM COATED ORAL DAILY
Status: DISCONTINUED | OUTPATIENT
Start: 2019-01-01 | End: 2019-01-01

## 2019-01-01 RX ORDER — PRAVASTATIN SODIUM 20 MG
20 TABLET ORAL DAILY
Status: DISCONTINUED | OUTPATIENT
Start: 2019-01-01 | End: 2019-01-01

## 2019-01-01 RX ORDER — TRAMADOL HYDROCHLORIDE 50 MG/1
50 TABLET ORAL EVERY 6 HOURS PRN
Qty: 60 TABLET | Refills: 0 | Status: SHIPPED | OUTPATIENT
Start: 2019-01-01 | End: 2019-01-02

## 2019-01-01 RX ORDER — HYDROCODONE BITARTRATE AND ACETAMINOPHEN 5; 325 MG/1; MG/1
2 TABLET ORAL EVERY 4 HOURS PRN
Status: DISCONTINUED | OUTPATIENT
Start: 2019-01-01 | End: 2019-01-01

## 2019-01-01 RX ORDER — MORPHINE SULFATE 2 MG/ML
1 INJECTION, SOLUTION INTRAMUSCULAR; INTRAVENOUS EVERY 2 HOUR PRN
Status: DISCONTINUED | OUTPATIENT
Start: 2019-01-01 | End: 2019-01-01

## 2019-01-01 RX ORDER — TRAMADOL HYDROCHLORIDE 50 MG/1
50 TABLET ORAL EVERY 6 HOURS PRN
Status: DISCONTINUED | OUTPATIENT
Start: 2019-01-01 | End: 2019-01-01

## 2019-01-01 RX ORDER — NYSTATIN 100000 U/G
1 CREAM TOPICAL 2 TIMES DAILY
Status: DISCONTINUED | OUTPATIENT
Start: 2019-01-01 | End: 2019-01-01

## 2019-01-01 RX ORDER — METOCLOPRAMIDE HYDROCHLORIDE 5 MG/ML
5 INJECTION INTRAMUSCULAR; INTRAVENOUS EVERY 8 HOURS PRN
Status: DISCONTINUED | OUTPATIENT
Start: 2019-01-01 | End: 2019-01-01

## 2019-01-01 RX ORDER — METOPROLOL SUCCINATE 25 MG/1
25 TABLET, EXTENDED RELEASE ORAL DAILY
Status: DISCONTINUED | OUTPATIENT
Start: 2019-01-01 | End: 2019-01-01

## 2019-01-01 RX ORDER — FLUCONAZOLE 200 MG/1
200 TABLET ORAL DAILY
Status: DISCONTINUED | OUTPATIENT
Start: 2019-01-01 | End: 2019-01-01

## 2019-01-01 RX ORDER — QUETIAPINE 100 MG/1
100 TABLET, FILM COATED ORAL NIGHTLY
Status: DISCONTINUED | OUTPATIENT
Start: 2019-01-01 | End: 2019-01-01

## 2019-01-01 RX ORDER — OXYCODONE HYDROCHLORIDE 5 MG/1
5 TABLET ORAL EVERY 4 HOURS PRN
Qty: 60 TABLET | Refills: 0 | Status: SHIPPED | OUTPATIENT
Start: 2019-01-01 | End: 2019-01-02

## 2019-01-01 RX ORDER — ESCITALOPRAM OXALATE 10 MG/1
10 TABLET ORAL DAILY
Status: DISCONTINUED | OUTPATIENT
Start: 2019-01-01 | End: 2019-01-01

## 2019-01-01 RX ORDER — SENNOSIDES 8.6 MG
17.2 TABLET ORAL NIGHTLY
Status: DISCONTINUED | OUTPATIENT
Start: 2019-01-01 | End: 2019-01-01

## 2019-01-01 RX ORDER — MORPHINE SULFATE 4 MG/ML
4 INJECTION, SOLUTION INTRAMUSCULAR; INTRAVENOUS EVERY 2 HOUR PRN
Status: DISCONTINUED | OUTPATIENT
Start: 2019-01-01 | End: 2019-01-01

## 2019-01-01 RX ORDER — SODIUM CHLORIDE 0.9 % (FLUSH) 0.9 %
3 SYRINGE (ML) INJECTION AS NEEDED
Status: DISCONTINUED | OUTPATIENT
Start: 2019-01-01 | End: 2019-01-01

## 2019-01-01 RX ORDER — PANTOPRAZOLE SODIUM 20 MG/1
20 TABLET, DELAYED RELEASE ORAL
Status: DISCONTINUED | OUTPATIENT
Start: 2019-01-01 | End: 2019-01-01

## 2019-01-01 RX ORDER — METOCLOPRAMIDE HYDROCHLORIDE 5 MG/ML
10 INJECTION INTRAMUSCULAR; INTRAVENOUS EVERY 8 HOURS PRN
Status: DISCONTINUED | OUTPATIENT
Start: 2019-01-01 | End: 2019-01-01

## 2019-01-01 RX ORDER — LIDOCAINE 50 MG/G
1 PATCH TOPICAL DAILY
Status: DISCONTINUED | OUTPATIENT
Start: 2019-01-01 | End: 2019-01-01

## 2019-01-01 RX ORDER — TRAMADOL HYDROCHLORIDE 50 MG/1
50 TABLET ORAL EVERY 6 HOURS PRN
Qty: 60 TABLET | Refills: 0 | Status: SHIPPED | OUTPATIENT
Start: 2019-01-01 | End: 2019-01-01

## 2019-01-01 NOTE — PROGRESS NOTES
[facility] Hospitalist Progress Note     CC: Hospital Follow up    PCP: Anson Morales MD       Assessment/Plan:     Active Problems:    Wound infection    Mr. Solano is a 70 yo male [address] [employer] sig for bladder ca s/p chemo, BPH, CKD 3, CAD, depression, HTN, HLD 1.6, was 2.2 at OSH but improved to baseline with low rate IVF  -monitor cre 1.33 -> 1.86 post op  -monitor UOP closely     #Coronary artery disease  -hold asa and palvix given upcoming surgery per Dr. Diana Zheng with surgery need to hold for 28 days  -BB [facility] facility      #Chronic bilateral low back pain with bilateral sciatica/chronic opiate use  -T3#, OXY and prn morphine for now  - wean as able     #Obesity- BMI 36  -o/p weight loss endeavors     #Insomnia  -seroquel started per psych last admission with de 6. 9*  8.2*   HCT  23.4*  23.0*  21.6*  24.7*   MCV  89.0  89.1   --   89.5   MCH  29.8  29.3   --   29.8   MCHC  33.5  32.8   --   33.3   RDW  15.6*  16.2*   --   17.2*   WBC  7.0  6.0   --   10.9   PLT  212  200   --   107*         Recent Labs   Lab  12/3 01/01/19 0856     Normal Saline Flush, Normal Saline Flush, oxyCODONE HCl, oxyCODONE HCl, TraMADol HCl, Normal Saline Flush, acetaminophen, PEG 3350, magnesium hydroxide, bisacodyl, ondansetron HCl

## 2019-01-01 NOTE — PLAN OF CARE
Achieve highest/safest level of mobility/gait Not Progressing    PT & OT saw pt this am, sat pt on side of bed, significant exertion for pt to stablize.    Verbalizes/displays adequate comfort level or patient's stated pain goal Progressing    Oxycodone x2

## 2019-01-01 NOTE — PHYSICAL THERAPY NOTE
PHYSICAL THERAPY HIP EVALUATION - INPATIENT     Room Number: 238/238-A  Evaluation Date: 1/1/2019  Type of Evaluation: Initial  Physician Order: PT Eval and Treat    Presenting Problem: revision left JOHN, antibiotic spacer placed, femoral component removed fatigued and needs to lie back down. Dependent for repositioning in bed.  Bunny boot placed on RLE that is noted to be rotated externally but he is able to straighten partially to neutral. Davi Brooks is aware and reports she asked ortho MD about this and is fi [facility]) 2016   • Personal history of antineoplastic chemotherapy    • Sleep apnea     CPAP       Past Surgical History  Past Surgical History:   Procedure Laterality Date   • CAROTID ENDARTERECTOMY Bilateral 2010   • HIP TOTAL ARTHROPLASTY REVISION Right 12/ Standing: Not tested  Dynamic Standing: Not tested    ADDITIONAL TESTS     Edema: (see RN note)  Edema Location: entire right leg    ACTIVITY TOLERANCE  Pulse: 99(with activity, 83 at rest)  Heart Rate Source: Monitor  Resp: 22  BP: 104/81  BP Location: Ri better, go back to rehab   Goal #1 Patient is able to demonstrate supine - sit EOB @ level: moderate assistance following posterior hip precautions   Goal #1   Current Status    Goal #2 Patient is able to demonstrate transfers Sit to/from Stand at [employer]

## 2019-01-01 NOTE — PROGRESS NOTES
[address] Patient Status:  Inpatient    1940 MRN [id number]   Location [facility] 2W/SW Attending Yanira Hernandez MD   Hosp Day # 4 PCP Ginny Escalante MD     Subjective:  Procedure(s):  [address] following and managing antibiotics  DC Drain when output less than 30 cc a shift  Toe-touch weightbearing right lower extremity  PT/OT for transfers only no active ambulation  Hip precautions  -Lovenox for DVT prophylaxis for 28 days– would like to hold Pl Oral Daily PRN   bisacodyl (DULCOLAX) rectal suppository 10 mg 10 mg Rectal Daily PRN   ondansetron HCl (ZOFRAN) injection 4 mg 4 mg Intravenous Q6H PRN   Meropenem (MERREM) 500 mg in sodium chloride 0.9% 100 mL  mg Intravenous Q12H   Vancomycin HCl

## 2019-01-01 NOTE — OPERATIVE REPORT
Operative Report      Camila Bernheim Patient Status:  Inpatient    1940 MRN [id number]   Location [facility] 2W/ Attending Roxanne Estrada MD   Date of Procedure  2018 PCP Violeta Velasquez MD       Patient Name: Camila Bernheim David Barreto [id number] Right 1   SLEEVE CBL 2MM BMP ORTH CMB - [id number]  SLEEVE CBL 2MM BMP ORTH CMB  EH [employer] [id number] Right 1       Specimen:   ID Type Source Tests Collected by Time Destination   1 : 4. RETAINED HARDWARE RIGHT HIP Tissu transfusion. Discussed risk of blood clot and wound healing complications. We discussed he would likely have a decrease in his function even after a complete success due to the extensiveness of the surgery.   Patient family wished to proceed    Procedure: the bone piece we then carefully worked around the exposed implant with a microsagittal saw and osteotomes. We were then able to remove the stem. We then turned our attention up towards the acetabulum.   We placed her explant device into our acetabular co polyethylene acetabular component and held it into place until the cement dried. Once the cement was dried on both the acetabulum and stem. I malleted the stem into the femur. It sat down a little farther than the trial head.   The osteotomy still remain

## 2019-01-01 NOTE — PROGRESS NOTES
[facility] AND [facility] Infectious Disease Progress Note    Unknown Kamini Patient Status:  Inpatient    1940 MRN [id number]   Location [facility] 2W/SW Attending Alena Garner MD   Hosp Day # 4 PCP Whit Marin MD     Subjective:  Pt sle (DULCOLAX) rectal suppository 10 mg, 10 mg, Rectal, Daily PRN  •  ondansetron HCl (ZOFRAN) injection 4 mg, 4 mg, Intravenous, Q6H PRN  •  Meropenem (MERREM) 500 mg in sodium chloride 0.9% 100 mL MBP, 500 mg, Intravenous, Q12H  •  Vancomycin HCl (VANCOCIN)

## 2019-01-01 NOTE — OCCUPATIONAL THERAPY NOTE
OCCUPATIONAL THERAPY EVALUATION - INPATIENT      Room Number: 238/238-A  Evaluation Date: 1/1/2019  Type of Evaluation: Initial  Presenting Problem: (explant and spacer placement)    Physician Order: IP Consult to Occupational Therapy  Reason for Therapy: chronic back pain   • Bladder cancer (HCC)    • BPH (benign prostatic hyperplasia)    • CKD (chronic kidney disease) stage 3, GFR 30-59 ml/min (HCC)    • Coronary atherosclerosis    • DDD (degenerative disc disease), lumbar    • Depression    • Eczema PAIN ASSESSMENT  Ratin  Location: (RT hip)  Management Techniques: Relaxation;Repositioning    ACTIVITY TOLERANCE  Poor- to fatigues quickly with pain increasing to 7/10 with mobility    COGNITION  Alert and follows simple commands  Initially pt ap

## 2019-01-02 RX ORDER — OXYCODONE HYDROCHLORIDE 5 MG/1
5 TABLET ORAL EVERY 4 HOURS PRN
Qty: 60 TABLET | Refills: 0 | Status: SHIPPED | OUTPATIENT
Start: 2019-01-02 | End: 2019-01-04

## 2019-01-02 RX ORDER — ENOXAPARIN SODIUM 100 MG/ML
40 INJECTION SUBCUTANEOUS DAILY
Qty: 11.2 ML | Refills: 0 | Status: SHIPPED | OUTPATIENT
Start: 2019-01-02 | End: 2019-01-04

## 2019-01-02 RX ORDER — ACETAMINOPHEN 325 MG/1
650 TABLET ORAL ONCE
Status: COMPLETED | OUTPATIENT
Start: 2019-01-02 | End: 2019-01-02

## 2019-01-02 RX ORDER — TRAMADOL HYDROCHLORIDE 50 MG/1
50 TABLET ORAL EVERY 6 HOURS PRN
Qty: 60 TABLET | Refills: 0 | Status: SHIPPED | OUTPATIENT
Start: 2019-01-02 | End: 2019-01-04

## 2019-01-02 RX ORDER — SODIUM CHLORIDE 0.9 % (FLUSH) 0.9 %
10 SYRINGE (ML) INJECTION AS NEEDED
Status: DISCONTINUED | OUTPATIENT
Start: 2019-01-02 | End: 2019-01-07

## 2019-01-02 RX ORDER — SODIUM CHLORIDE 9 MG/ML
INJECTION, SOLUTION INTRAVENOUS ONCE
Status: COMPLETED | OUTPATIENT
Start: 2019-01-02 | End: 2019-01-02

## 2019-01-02 NOTE — PROGRESS NOTES
[facility] AND [facility] Infectious Disease  Progress Note    Micheal Broussard Patient Status:  Inpatient    1940 MRN [id number]   Location [facility] 2W/SW Attending Ayde Noriega MD   Hosp Day # 5 PCP Oracio Walton MD     Subjective:  Lionel Santamaria per family no certain plans for a 2nd stage  - On IV vancomycin and meropenem ongoing for now, will streamline tomorrow if OR cultures remain negative     2.  PVD with healing scabs noted to his foot  - No infection     3.  MRSA carriage swab negative

## 2019-01-02 NOTE — PROGRESS NOTES
[facility] Hospitalist Progress Note     CC: Hospital Follow up    PCP: Nguyễn Ibarra MD       Assessment/Plan:     Active Problems:    Wound infection    Mr. Yehuda Soulier is a 68 yo male [employer] sig for bladder ca s/p chemo, BPH, CKD 3, CAD, depression, HTN, HLD monitor CBC closely,     #LIN on CKD (chronic kidney disease) stage 3, GFR 30-59 ml/min ([facility])  -baseline creat 1.6, was 2.2 at OSH but improved    -monitor cre 1.33 -> 1.86 -> 1.94 -> 1.73  -monitor UOP closely     #Coronary artery disease  -holding asa an sleep     #PAD (peripheral artery disease) ([facility] [address]  -will resume aspirin once h/h stable but would recommend to hold plavix  surgery     #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 cataplexy  -provigil was held since prior admit as pt was having insomnia while hospitalized over, erythema noted in groin and arm pits  Neuro: AO*3, motor intact, no sensory deficits  Psyc: appropriate mood and affect      Data Review:       Labs:     Recent Labs   Lab  01/01/19   0436  01/01/19   1450  01/01/19   2304  01/02/19   0542   RBC  2.7 Pantoprazole Sodium  20 mg Oral QAM AC   • Pravastatin Sodium  20 mg Oral Nightly   • QUEtiapine Fumarate  100 mg Oral Nightly   • Vancomycin HCl  125 mg Oral Daily   • Senna  17.2 mg Oral Nightly   • meropenem  500 mg Intravenous Q12H   • vancomycin  1.5

## 2019-01-02 NOTE — PROGRESS NOTES
[address] Patient Status:  Inpatient    1940 MRN [id number]   Location [facility] 2W/SW Attending Cris Jiménez MD   Hosp Day # 5 PCP Tamera King MD     Subjective:  Procedure(s):  [address] SPACER   LOS: 4 days     ID following and managing antibiotics  DC Drain when output less than 30 cc a shift  Toe-touch weightbearing right lower extremity  PT/OT for transfers only no active ambulation  Hip precautions  -Lovenox for DVT prophylaxis for 28 Intravenous PRN   acetaminophen (TYLENOL) tab 650 mg 650 mg Oral Q6H PRN   PEG 3350 (MIRALAX) powder packet 17 g 17 g Oral Daily PRN   magnesium hydroxide (MILK OF MAGNESIA) 400 MG/5ML suspension 30 mL 30 mL Oral Daily PRN   bisacodyl (DULCOLAX) rectal sup

## 2019-01-02 NOTE — PROGRESS NOTES
120 [facility] dosing service    Follow-up Pharmacokinetic Consult for Vancomycin Dosing     Carmela Newman is a 69year old male who is being treated for cellulitis. Patient is on day 6 of Vancomycin 1.5 gm IV Q 24 hours. Goal trough is 15-20 ug/mL. on Trough of 23.3 ug/mL, pharmacokinetics, 106.2kg weight and renal function)    2. Pharmacy will re-check Vancomycin trough levels prior to 3rd dose. Goal trough level 15-20 ug/mL.     3.  Pharmacy will need BUN/Scr daily while on Vancomycin to assess re

## 2019-01-02 NOTE — PAYOR COMM NOTE
REF:  [id number] - DAYS PENDING IN IEXCHANGE-  CLINICALS 12/28/18- 12/31/18 FAXED ON 12/31/18, FAXING CLINICALS 1/1/19- 1/2/19 REQUESTING ADDITIONAL DAYS      1/1/18  Assessment/Plan:      Active Problems:    Wound infection     Mr. Demond Lopez is a 70 yo male 3, GFR 30-59 ml/min ([facility])  -baseline creat 1.6, was 2.2 at OSH but improved to baseline with low rate IVF  -monitor cre 1.33 -> 1.86 post op  -monitor UOP closely     #Coronary artery disease  -hold asa and palvix given upcoming surgery per Dr. Diana Zheng with insomnia while hospitalized and at facility      #Chronic bilateral low back pain with bilateral sciatica/chronic opiate use  -T3#, OXY and prn morphine for now  - wean as able     #Obesity- BMI 36  -o/p weight loss endeavors     FN:  - IVF: will stop IVF GFRNAA  44*  51*  34*   CA  8.6  8.5  6.7*   NA  140  141  139   K  4.0  4.0  4.6   CL  110  111*  112*   CO2  23  26  21*       Meds:      • enoxaparin  40 mg Subcutaneous Nightly   • MINERIN   Topical Daily   • nystatin   Topical BID   • allopurinol  3 fever  - BC at OSH 1/2 bottles with [facility], will follow, request records  - IV vanc and meropenem ordered will continue  - on ppx oral vanco  - Ortho and ID consulted  - 12/31 s/p explant of right JOHN, with placement of antibiotic spacer with Dr. Chicho Flores cardiologist on Dr. Demetrio Hendricks on 11/23/18 at [facility] cardiology [place], and was cleared for surgery   - recent ECHO done prior to surgery: ECHO 12/6/18  EF 50-55%, LVH mild, no sig valvular abnormalities, diastolic dys, mild MR, TR  - this would be pa 148/71        Intake/Output:     Intake/Output Summary (Last 24 hours) at 1/2/2019 1342  Last data filed at 1/2/2019 0546      Gross per 24 hour   Intake 2666 ml   Output 1605 ml   Net 1061 ml         Exam    Gen: No acute distress, alert and oriented x3, HCl, TraMADol HCl, Normal Saline Flush, acetaminophen, PEG 3350, magnesium hydroxide, bisacodyl, ondansetron HCl

## 2019-01-02 NOTE — PHYSICAL THERAPY NOTE
PHYSICAL THERAPY TREATMENT NOTE - INPATIENT     Room Number: 238/238-A       Presenting Problem: (L hip revision)    Problem List  Active Problems:    Wound infection      PHYSICAL THERAPY ASSESSMENT     Pt received supine in bed agreeable to PT/OT co vlad commode, etc.): Unable   -   Moving from lying on back to sitting on the side of the bed?: A Lot   How much help from another person does the patient currently need. ..   -   Moving to and from a bed to a chair (including a wheelchair)?: Total   -   Need to

## 2019-01-02 NOTE — CM/SW NOTE
Case Management update:  S/p Explant right total hip arthroplasty with placement of antibiotic spacer-with multiple services following. [employer] personnel April, called with authorization.  # [id number], authorized stay from 1/3-1/7/19;  ([phone])

## 2019-01-02 NOTE — OCCUPATIONAL THERAPY NOTE
Important Medication Changes: none    Further testing to be done:none        Next follow up visit: In office in 6 Months              HERE IS SOME IMPORTANT INFORMATION FOR OUR PATIENTS    If you need refills- call your pharmacist and they will contact our office.    If you have medical concerns call    [phone]. ( [facility] Office)                                                          [phone]  ( [place] office)                                                          [phone] ( [facility] office)                                                           [phone]( [facility]  Office)                   If you have a question during office hours call  and to make appointment [phone]. You will eventually speak with my nurses. If you need to speak to me directly, let them know and I will get back to you as soon as possible.  Better yet, you can consider signing up for [employer], our popular online communication portal to schedule an appointment, ask for a refill, or contact our staff. Go to:  [email or web address]    Alex Valladares MD   OCCUPATIONAL THERAPY TREATMENT NOTE - INPATIENT        Room Number: 238/238-A           Presenting Problem: (explant and spacer placement)    Problem List  Active Problems:    Wound infection      OCCUPATIONAL THERAPY ASSESSMENT     Chart review complete, ASSESSMENT  Rating: (does not quantify)  Location: (RT hip)  Management Techniques: Relaxation;Repositioning     ACTIVITY TOLERANCE  Tolerates EOB activity today ~5m with up to max a for safety     ACTIVITIES OF DAILY LIVING ASSESSMENT  AM-PAC ‘6-Clicks’ I

## 2019-01-02 NOTE — PROGRESS NOTES
[facility]    Cardiology Progress Note    Lilian Comment Patient Status:  Inpatient    1940 MRN [id number]   Location [facility] 2W/SW Attending Radha Hart MD   Hosp Day # 5 PCP Cassie Mcbride MD       Impression/Plan: 117/90    Intake/Output:     Intake/Output Summary (Last 24 hours) at 1/2/2019 0924  Last data filed at 1/2/2019 0546  Gross per 24 hour   Intake 2666 ml   Output 1815 ml   Net 851 ml       Last 3 Weights  12/28/18 1956 : 234 lb 3.2 oz (106.2 kg)  12/17/18 1. 50  1.33  1.86*  1.94*  1.73*   CA  8.4*  8.6  8.5  6.7*  7.0*  7.2*   MG  2.1  1.8  1.7*  1.6*   --   2.0   GLU  106*  103*  98  127*  117*  91       Recent Labs   Lab  12/28/18   2241   ALT  12*   AST  15   ALB  1.9*       No results for input(s): TROP Daily PRN   bisacodyl (DULCOLAX) rectal suppository 10 mg 10 mg Rectal Daily PRN   ondansetron HCl (ZOFRAN) injection 4 mg 4 mg Intravenous Q6H PRN   Meropenem (MERREM) 500 mg in sodium chloride 0.9% 100 mL  mg Intravenous Q12H   Vancomycin HCl (VAN

## 2019-01-03 RX ORDER — 0.9 % SODIUM CHLORIDE 0.9 %
VIAL (ML) INJECTION
Status: COMPLETED
Start: 2019-01-03 | End: 2019-01-03

## 2019-01-03 RX ORDER — SODIUM CHLORIDE 9 MG/ML
INJECTION, SOLUTION INTRAVENOUS
Status: DISPENSED
Start: 2019-01-03 | End: 2019-01-03

## 2019-01-03 NOTE — PROGRESS NOTES
120 [facility] Dosing Service  Antibiotic Dosing    Unknown Kamini is a 69year old male for whom pharmacy is dosing Micafungin for treatment of  PJI infection . Tina Victor Other antibiotics (Not dosed by pharmacy): none    Allergies: has No Known Allergies.     Vi

## 2019-01-03 NOTE — OCCUPATIONAL THERAPY NOTE
OCCUPATIONAL THERAPY TREATMENT NOTE - INPATIENT    Room Number: 442/442-A         Presenting Problem: (explant and spacer placement)     Problem List  Active Problems:    Wound infection      OCCUPATIONAL THERAPY ASSESSMENT   RN cleared pt for participatio conservation/work simplification techniques;ADL training;Functional transfer training;Patient/Family education;Patient/Family training;Equipment eval/education; Compensatory technique education    SUBJECTIVE  \"I'd like to sit in the chair    OBJECTIVE  Pre 1x  Comment: ongoing          Patient will independently recall posterior hip precautions 1x  Comment: ongoing            Goals  on:19  Frequency:3-5x/week

## 2019-01-03 NOTE — PLAN OF CARE
SKIN/TISSUE INTEGRITY - ADULT    • Skin integrity remains intact Not Progressing          Pt has excoriation to buttocks. Cream applied and will continue to monitor.      DISCHARGE PLANNING    • Discharge to home or other facility with appropriate resources

## 2019-01-03 NOTE — PROGRESS NOTES
[facility] Infectious Disease Progress Note    Millie Julien Patient Status:  Inpatient    1940 MRN [id number]   Location [facility] 2W/SW Attending Connor Jones MD   Hosp Day # 6 PCP Estrella Mera MD     Subjective:  Pt wit 400 MG/5ML suspension 30 mL, 30 mL, Oral, Daily PRN  •  bisacodyl (DULCOLAX) rectal suppository 10 mg, 10 mg, Rectal, Daily PRN  •  ondansetron HCl (ZOFRAN) injection 4 mg, 4 mg, Intravenous, Q6H PRN  •  Meropenem (MERREM) 500 mg in sodium chloride 0.9% 10 NP  1/1/2019  10:02 AM

## 2019-01-03 NOTE — PROGRESS NOTES
[facility] Hospitalist Progress Note     CC: Hospital Follow up    PCP: Oracio Walton MD       Assessment/Plan:     Active Problems:    Wound infection    Mr. Michelle Strange is a 68 yo male [employer] sig for bladder ca s/p chemo, BPH, CKD 3, CAD, depression, HTN, HLD (monitor with so many pRBC's)  - monitor CBC closely,     #LIN on CKD (chronic kidney disease) stage 3, GFR 30-59 ml/min ([facility])  -baseline creat 1.6, was 2.2 at OSH but improved    -monitor cre 1.33 -> 1.86 -> 1.94 -> 1.73  -monitor UOP closely     #Gale protocol  -encourage use with all sleep     #PAD (peripheral artery disease) ([facility] [address]  -will resume aspirin once h/h stable but would recommend to hold plavix  surgery     #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 cataplexy  -provigil was held since prior admit as pt was h extremities  Neuro: Grossly normal, CN intact, sensory intact  Psych: Affect- normal  SKIN: dry scaly, flaking skin over entire body  EXT: no edema          Data Review:       Labs:     Recent Labs   Lab  01/01/19   1450  01/01/19   0414  01/02/19   1940 docusate sodium  100 mg Oral BID   • escitalopram  20 mg Oral Daily   • ferrous sulfate  325 mg Oral BID with meals   • Metoprolol Succinate ER  25 mg Oral Daily   • Pantoprazole Sodium  20 mg Oral QAM AC   • Pravastatin Sodium  20 mg Oral Nightly   • QUEt

## 2019-01-03 NOTE — PHYSICAL THERAPY NOTE
PHYSICAL THERAPY TREATMENT NOTE - INPATIENT     Room Number: 442/442-A       Presenting Problem: (L hip revision)    Problem List  Active Problems:    Wound infection      PHYSICAL THERAPY ASSESSMENT     Consulted w/ RN prior to seeing pt for PT/OT co-vlad RESTRICTION  Weight Bearing Restriction: R lower extremity        R Lower Extremity: Toe Touch Weight Bearing       PAIN ASSESSMENT   Rating: (did not rate)  Location: RLE  Management Techniques: Activity promotion; Body mechanics;Repositioning    BALANCE Current Status Max A x 2   Goal #2 Patient is able to demonstrate transfers Sit to/from Stand at assistance level: moderate assistance maintaining TTWB RLE with RW      Goal #2  Current Status MAX A x 2   Goal #3 Patient is able to perform bed to/from ch

## 2019-01-03 NOTE — PROGRESS NOTES
[facility] HOSP - [facility]    Cardiology Progress Note    Dara Hamilton Patient Status:  Inpatient    1940 MRN [id number]   Location [facility] 2W/SW Attending Mally Elizondo MD   Hosp Day # 6 PCP Anson Morales MD       Impression/Plan: 1/3/2019 0725  Last data filed at 1/3/2019 0600  Gross per 24 hour   Intake 756 ml   Output 1160 ml   Net -404 ml       Last 3 Weights  12/28/18 1956 : 234 lb 3.2 oz (106.2 kg)  12/17/18 1544 : 225 lb 9.6 oz (102.3 kg)  12/06/18 1118 : 228 lb (103.4 kg)  1 AST  15   ALB  1.9*       No results for input(s): TROP in the last 168 hours.     Allergies:   No Known Allergies    Medications:    Current Facility-Administered Medications:  Normal Saline Flush 0.9 % injection 10 mL 10 mL Intravenous PRN   Vancomycin

## 2019-01-03 NOTE — PLAN OF CARE
Problem: Patient Centered Care  Goal: Patient preferences are identified and integrated in the patient's plan of care  Interventions:  - What would you like us to know as we care for you?   - Provide timely, complete, and accurate information to patient/fa Assess pain using appropriate pain scale  - Administer analgesics based on type and severity of pain and evaluate response  - Implement non-pharmacological measures as appropriate and evaluate response  - Consider cultural and social influences on pain and form as appropriate  - Assess patient's ability to be responsible for managing their own health  - Refer to Case Management Department for coordinating discharge planning if the patient needs post-hospital services based on physician/LIP order or complex n

## 2019-01-04 RX ORDER — FUROSEMIDE 10 MG/ML
20 INJECTION INTRAMUSCULAR; INTRAVENOUS ONCE
Status: COMPLETED | OUTPATIENT
Start: 2019-01-04 | End: 2019-01-04

## 2019-01-04 RX ORDER — 0.9 % SODIUM CHLORIDE 0.9 %
VIAL (ML) INJECTION
Status: DISPENSED
Start: 2019-01-04 | End: 2019-01-04

## 2019-01-04 RX ORDER — FLUCONAZOLE 200 MG/1
200 TABLET ORAL EVERY 24 HOURS
Status: DISCONTINUED | OUTPATIENT
Start: 2019-01-04 | End: 2019-01-23

## 2019-01-04 RX ORDER — OXYCODONE HYDROCHLORIDE 5 MG/1
5 TABLET ORAL EVERY 4 HOURS PRN
Qty: 60 TABLET | Refills: 0 | Status: SHIPPED | OUTPATIENT
Start: 2019-01-04 | End: 2019-01-06

## 2019-01-04 RX ORDER — ENOXAPARIN SODIUM 100 MG/ML
40 INJECTION SUBCUTANEOUS DAILY
Qty: 11.2 ML | Refills: 0 | Status: SHIPPED | OUTPATIENT
Start: 2019-01-04 | End: 2019-02-01

## 2019-01-04 RX ORDER — TRAMADOL HYDROCHLORIDE 50 MG/1
50 TABLET ORAL EVERY 6 HOURS PRN
Qty: 60 TABLET | Refills: 0 | Status: SHIPPED | OUTPATIENT
Start: 2019-01-04 | End: 2019-01-23

## 2019-01-04 NOTE — PROGRESS NOTES
[facility] HOSP - [facility]    Cardiology Progress Note    Tressa Wall Patient Status:  Inpatient    1940 MRN [id number]   Location [facility] 2W/SW Attending Rita Gómez MD   Hosp Day # 7 PCP Georgette Kelley MD       Impression/Plan: Intake/Output Summary (Last 24 hours) at 1/4/2019 0646  Last data filed at 1/4/2019 0600  Gross per 24 hour   Intake 315 ml   Output 938 ml   Net -623 ml       Last 3 Weights  12/28/18 1956 : 234 lb 3.2 oz (106.2 kg)  12/17/18 1544 : 225 lb 9.6 oz (102 --    GLU  103*  98  127*  117*  91  107*  101*       Recent Labs   Lab  12/28/18   2241   ALT  12*   AST  15   ALB  1.9*       No results for input(s): TROP in the last 168 hours.     Allergies:   No Known Allergies    Medications:    Current Facility-[facility]

## 2019-01-04 NOTE — OCCUPATIONAL THERAPY NOTE
OCCUPATIONAL THERAPY TREATMENT NOTE - INPATIENT        Room Number: 442/442-A           Presenting Problem: (explant and spacer placement)    Problem List  Active Problems:    Wound infection      OCCUPATIONAL THERAPY ASSESSMENT   RN cleared pt for partici training;Functional transfer training;Patient/Family education;Patient/Family training;Equipment eval/education; Compensatory technique education    SUBJECTIVE  \"you filthy rat!!!!\" while clutching R groin     OBJECTIVE  Precautions: JOHN - posterior;Drain

## 2019-01-04 NOTE — PHYSICAL THERAPY NOTE
PHYSICAL THERAPY TREATMENT NOTE - INPATIENT     Room Number: 442/442-A       Presenting Problem: (L hip revision)    Problem List  Active Problems:    Wound infection      PHYSICAL THERAPY ASSESSMENT     Patient received supine in bed; pt's dtr present at Discharge Recommendations: Sub-acute rehabilitation     PLAN  PT Treatment Plan: Bed mobility; Endurance; Energy conservation;Patient education; Family education;Gait training;Transfer training    SUBJECTIVE  \"YOU FILTHY RAT! \"- pt pointing to groin    OBJEC present    CURRENT GOALS   Goals to be met by: 1/15/19  Patient Goal Patient's self-stated goal is: to get better, go back to rehab   Goal #1 Patient is able to demonstrate supine - sit EOB @ level: moderate assistance following posterior hip precautions

## 2019-01-04 NOTE — PROGRESS NOTES
[address] Patient Status:  Inpatient    1940 MRN [id number]   Location [facility] 2W/SW Attending Rosanna Luna MD   Hosp Day # 7 PCP Estephania Choi MD     Subjective:  Procedure(s):  [address] (LOVENOX) 40 MG/0.4ML injection 40 mg 40 mg Subcutaneous Nightly   minerin (EUCERIN) cream  Topical Daily   oxyCODONE HCl (OXY-IR) cap/tab 5 mg 5 mg Oral Q4H PRN   oxyCODONE HCl (OXY-IR) cap/tab 10 mg 10 mg Oral Q4H PRN   TraMADol HCl (ULTRAM) tab 50 mg 50

## 2019-01-04 NOTE — CM/SW NOTE
245pm: CAN was notified the IV micafungin will be discontinued and will be put on PO  fluconazole. CAN notified Sid Adler with [facility] who is aware. Plan is for possible discharge this weekend. If okay to discharge, please contact Sid Adler to arrange.  Insu

## 2019-01-04 NOTE — PLAN OF CARE
SKIN/TISSUE INTEGRITY - ADULT    • Skin integrity remains intact Not Progressing          Pt bottom still excoriated. Barrier cream applied to bottom and pt on gel mattress. Dressing to R hip C/D/I. RANDAL dressing x2 intact. Mepilex placed on bottom.     DIS

## 2019-01-04 NOTE — PROGRESS NOTES
[facility] Hospitalist Progress Note     CC: Hospital Follow up    PCP: Rad Traylor MD       Assessment/Plan:     Active Problems:    Wound infection    Mr. Chuck Fritz is a 68 yo male [employer] sig for bladder ca s/p chemo, BPH, CKD 3, CAD, depression, HTN, HLD s/p 8 Units of pRBC's on 12/31, 1 unit on 1/2   - calcium 7,2 this am stable (monitor with so many pRBC's)  - monitor CBC closely,     #LIN on CKD (chronic kidney disease) stage 3, GFR 30-59 ml/min ([facility])  -baseline creat 1.6, was 2.2 at OSH but improved diastolic dys, mild MR, TR     #SAM on CPAP  -cpap protocol  -encourage use with all sleep     #PAD (peripheral artery disease) ([facility] [address]  -will resume aspirin once h/h stable but would recommend to hold plavix  surgery     #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 cataplex scaly, flaking skin over entire body  EXT: no edema      Data Review:       Labs:     Recent Labs   Lab  01/02/19   0542   01/03/19   0500  01/03/19   1348  01/04/19   0607   RBC  2.34*   --   2.60*   --   2.55*   HGB  6.9*   < >  7.7*  8.4*  7.6*   HCT  2

## 2019-01-04 NOTE — PAYOR COMM NOTE
REF:  [id number] - DAYS PENDING IN IEXCHANGE-  CLINICALS 12/28/18- 12/31/18 FAXED ON 12/31/18, FAXED CLINICALS 1/1/19- 1/2/19 ON 1/2/19 -  REQUESTING ADDITIONAL DAYS      1/2/19    Assessment/Plan:      Active Problems:    Wound infection       Cristela Seip is 1 unit on 1/2   - calcium 7,2 this am stable (monitor with so many pRBC's)  - monitor CBC closely,      #LIN on CKD (chronic kidney disease) stage 3, GFR 30-59 ml/min ([facility])  -baseline creat 1.6, was 2.2 at OSH but improved    -monitor cre 1.33 -> 1.86 -> 1 mild MR, TR     #SAM on CPAP  -cpap protocol  -encourage use with all sleep     #PAD (peripheral artery disease) ([facility] [address]  -will resume aspirin once h/h stable but would recommend to hold plavix  surgery     #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 cataplexy  -provigil wa  88.9   MCH  29.8  28.7   --   29.4   MCHC  33.3  32.2   --   33.1   RDW  17.2*  16.8*   --   17.2*   WBC  10.9  12.2*   --   8.6   PLT  107*  112*   --   99*                      Recent Labs   Lab  01/01/19   9500  01/01/19   7773  01/02/19   7145   GLU   with revision on 12/6  - #s/p I&D with poly and head exchange and antibiotic beads on 12/18, DC'ed on IV invanz  - ID followed last admit, PICC line placed 12/18, plan for 6 weeks IV abx with invanz and plan on po suppression thereafter, prior OR cx with e 12/6/18  EF 50-55%, LVH mild, no sig valvular abnormalities, diastolic dys, mild MR, TR  - tele, cards following  - asa 81mg, need to start when h/h stable     Pre op  - patient without chest pain or sob, but obvious functional limitations  - ECG similar t same. Having some pain today. No N/V. Had large BM today     OBJECTIVE:     Blood pressure 142/68, pulse 82, temperature 97.5 °F (36.4 °C), temperature source Oral, resp. rate 18, height 167.6 cm (5' 6\"), weight 234 lb 3.2 oz (106.2 kg), SpO2 99 %.      Te ALB  1.9*            Imaging:  Xr Hip W Or Wo Pelvis 1 View, Right (cpt=73501)     Result Date: 12/31/2018  CONCLUSION:   Interval revision right hip arthroplasty with expected soft tissue swelling and gas.  No radiographic evidence of immediate hardware beads placed on 12/18 DC'ed to SNF on 12/22 with Jefferson Aguirre, who presented to [facility] [facility] on 12/27 with weakness and drainage from wound concern for re-infection and was transferred to [place] [address] 12/28 for further management, s/p explant of R JOHN 12/31     # plavix for 28 days (while on lovneox) with bleeding concerns.     -BB continued   -statin continued  - severe CAD with diffuse LAD disease and Lcx with PCI and acute closure, stress test in 12/2017 was reported as normal  -patient with pre op eval by mary able     #Obesity- BMI 36  -o/p weight loss endeavors     #Insomnia  -seroquel started per psych last admission with delerium/AMS  -continue 100 mg nightly        FN:  - IVF: none  - Diet: reg     DVT Prophy: lovneox  Atrophy: IS  Lines: PIV        Subject Sodium Chloride         • micafungin (MYCAMINE) IVPB  100 mg Intravenous Q24H   • enoxaparin  40 mg Subcutaneous Nightly   • MINERIN   Topical Daily   • nystatin   Topical BID   • allopurinol  300 mg Oral Daily   • docusate sodium  100 mg Oral BID   • esci

## 2019-01-04 NOTE — PROGRESS NOTES
[facility] AND [facility] Infectious Disease  Progress Note    Nayan Guthrie Patient Status:  Inpatient    1940 MRN [id number]   Location [facility] 4W/SW/SE Attending Nader Smith MD   Hosp Day # 7 PCP Benjamín Vunog MD     Subject explantation of hardware and placement of an antibiotic spacer - per family no certain plans for a 2nd stage, cultures with candida parapsilosis  - On IV meropenem and IV micafungin     2.  PVD with healing scabs noted to his foot  - No infection     3.  M

## 2019-01-05 RX ORDER — SODIUM CHLORIDE 9 MG/ML
INJECTION, SOLUTION INTRAVENOUS ONCE
Status: COMPLETED | OUTPATIENT
Start: 2019-01-05 | End: 2019-01-05

## 2019-01-05 RX ORDER — SODIUM CHLORIDE 0.9 % (FLUSH) 0.9 %
10 SYRINGE (ML) INJECTION AS NEEDED
Status: DISCONTINUED | OUTPATIENT
Start: 2019-01-05 | End: 2019-01-07

## 2019-01-05 NOTE — PROGRESS NOTES
[facility] Hospitalist Progress Note     CC: Hospital Follow up    PCP: Kalyani Gold MD       Assessment/Plan:     Active Problems:    Wound infection    Mr. Vida Rizvi is a 70 yo male [address] [employer] sig for bladder ca s/p chemo, BPH, CKD 3, CAD, depression, HTN, HLD -> 7.1 today  - s/p 8 Units of pRBC's on 12/31, 1 unit on 1/2   - calcium 7,2 stable (monitor with so many pRBC's)  - will give 1 unit pRBC today    #LIN on CKD (chronic kidney disease) stage 3, GFR 30-59 ml/min ([facility])  -baseline creat 1.6, was 2.2 at OSH b abnormalities, diastolic dys, mild MR, TR     #SAM on CPAP  -cpap protocol  -encourage use with all sleep     #PAD (peripheral artery disease) ([facility] [address]  -will resume aspirin once h/h stable but would recommend to hold plavix  surgery     #Primary narcolepsy w +BS  MSK: moves all extremities  Neuro: Grossly normal, CN intact, sensory intact  Psych: Affect- normal  SKIN: dry scaly, flaking skin over entire body  EXT: no edema      Data Review:       Labs:     Recent Labs   Lab  01/03/19   0500   01/04/19   0711 HCl

## 2019-01-05 NOTE — PLAN OF CARE
Peggi Matters did well overnight. More alert and oriented. For pain management of the Right Hip pain we used Tramadol and ice packs prn. This worked for him.  The family was concerned about him receiving the Oxycodone which seemed to make him confused and hallucinat

## 2019-01-05 NOTE — PHYSICAL THERAPY NOTE
PHYSICAL THERAPY HIP TREATMENT NOTE - INPATIENT    Room Number: 442/442-A            Presenting Problem: (L hip revision)    Problem List  Active Problems:    Wound infection      PHYSICAL THERAPY ASSESSMENT     Pt ok to be seen per RN Janeth Pryor.  Reviewed pre Bearing       PAIN ASSESSMENT   Rating: 10  Location: R hip with activity  Management Techniques: Activity promotion; Body mechanics;Repositioning;Breathing techniques    BALANCE  Static Sitting: Poor +  Dynamic Sitting: Poor  Static Standing: Not tested  D moderate assistance maintaining TTWB RLE with RW      Goal #2  Current Status NT   Goal #3 Patient is able to perform bed to/from chair transfers with RW with moderate assistance and maintain TTWB RLE   Goal #3   Current Status NT   Goal #4 Patient will to

## 2019-01-05 NOTE — PROGRESS NOTES
[facility]    Cardiology Progress Note    Melody Elder Patient Status:  Inpatient    1940 MRN [id number]   Location [facility] 2W/SW Attending Ivy Min MD   Hosp Day # 8 PCP Mya Blelo MD       Impression/Plan: 20  BP: 122/47    Intake/Output:     Intake/Output Summary (Last 24 hours) at 1/5/2019 0625  Last data filed at 1/5/2019 0528  Gross per 24 hour   Intake 390 ml   Output 4768 ml   Net -4378 ml       Last 3 Weights  12/28/18 1956 : 234 lb 3.2 oz (106.2 kg) 2.0  2.0   --    GLU  103*  98  127*  117*  91  107*  101*       No results for input(s): ALT, AST, ALB, AMYLASE, LIPASE, LDH in the last 168 hours. Invalid input(s): ALPHOS, TBIL, DBIL, TPROT    No results for input(s): TROP in the last 168 hours.     A

## 2019-01-06 RX ORDER — FUROSEMIDE 10 MG/ML
20 INJECTION INTRAMUSCULAR; INTRAVENOUS ONCE
Status: COMPLETED | OUTPATIENT
Start: 2019-01-06 | End: 2019-01-06

## 2019-01-06 RX ORDER — NYSTATIN 100000 U/G
1 CREAM TOPICAL 2 TIMES DAILY
Qty: 1 TUBE | Refills: 0 | Status: SHIPPED | OUTPATIENT
Start: 2019-01-06 | End: 2019-01-01 | Stop reason: ALTCHOICE

## 2019-01-06 RX ORDER — OXYCODONE HYDROCHLORIDE AND ACETAMINOPHEN 5; 325 MG/1; MG/1
1 TABLET ORAL EVERY 4 HOURS PRN
Status: CANCELLED | OUTPATIENT
Start: 2019-01-06

## 2019-01-06 RX ORDER — FLUCONAZOLE 200 MG/1
200 TABLET ORAL DAILY
Qty: 42 TABLET | Refills: 0 | Status: SHIPPED | OUTPATIENT
Start: 2019-01-06 | End: 2019-01-23

## 2019-01-06 NOTE — PROGRESS NOTES
[address] Patient Status:  Inpatient    1940 MRN [id number]   Location [facility] 2W/SW Attending Aravind Leal MD   Hosp Day # 9 PCP Licha Kelly MD     Subjective:  Procedure(s):  [address] 10 mL Intravenous PRN   Enoxaparin Sodium (LOVENOX) 40 MG/0.4ML injection 40 mg 40 mg Subcutaneous Nightly   minerin (EUCERIN) cream  Topical Daily   oxyCODONE HCl (OXY-IR) cap/tab 5 mg 5 mg Oral Q4H PRN   TraMADol HCl (ULTRAM) tab 50 mg 50 mg Oral Q6H PRN

## 2019-01-06 NOTE — PROGRESS NOTES
[facility]    Cardiology Progress Note    Kolton Herrera Patient Status:  Inpatient    1940 MRN [id number]   Location [facility] 2W/SW Attending Arturo Stanley MD   Hosp Day # 9 PCP Sherrell Centeno MD       Impression/Plan: °C)  Pulse: 83  Resp: 18  BP: 133/74    Intake/Output:     Intake/Output Summary (Last 24 hours) at 1/6/2019 1359  Last data filed at 1/6/2019 0700  Gross per 24 hour   Intake 400 ml   Output 1630 ml   Net -1230 ml       Last 3 Weights  12/28/18 1956 : 234 1. 39  1.38   CA  8.5  6.7*   < >  7.2*  7.3*  7.6*  7.4*  7.8*   MG  1.7*  1.6*   --   2.0  2.0   --    --    --    GLU  98  127*   < >  91  107*  101*  91  97    < > = values in this interval not displayed.        No results for input(s): ALT, AST, ALB, AM Daily PRN   ondansetron HCl (ZOFRAN) injection 4 mg 4 mg Intravenous Q6H PRN

## 2019-01-06 NOTE — PHYSICAL THERAPY NOTE
PHYSICAL THERAPY HIP TREATMENT NOTE - INPATIENT    Room Number: 442/442-A            Presenting Problem: (L hip revision)    Problem List  Active Problems:    Wound infection      PHYSICAL THERAPY ASSESSMENT     RN Claudia Hylton is ok to see this pt for PT vlad 10/10 during activity)  Location: R hip  Management Techniques: Activity promotion; Body mechanics; Relaxation;Repositioning    BALANCE  Static Sitting: Fair -  Dynamic Sitting: Poor +  Static Standing: Not tested  Dynamic Standing: Not tested    AM-PAC '6-C to/from chair transfers with RW with moderate assistance and maintain TTWB RLE   Goal #3   Current Status NT   Goal #4 Patient will tolerate standing x 3 min with BUE support on RW and maintain TTWB RLE   Goal #4   Current Status Unable   Goal #5 Patient v

## 2019-01-06 NOTE — PROGRESS NOTES
[facility] Hospitalist Progress Note     CC: Hospital Follow up    PCP: Sarah Ge MD       Assessment/Plan:     Active Problems:    Wound infection    Mr. Nancy Brown is a 68 yo male [address] [employer] sig for bladder ca s/p chemo, BPH, CKD 3, CAD, depression, HTN, HLD -> 7.1 today  - s/p 8 Units of pRBC's on 12/31, 1 unit on 1/2   - calcium 7,2 stable (monitor with so many pRBC's)  - will give 1 unit pRBC today    #LIN on CKD (chronic kidney disease) stage 3, GFR 30-59 ml/min ([facility])  -baseline creat 1.6, was 2.2 at OSH b abnormalities, diastolic dys, mild MR, TR     #SAM on CPAP  -cpap protocol  -encourage use with all sleep     #PAD (peripheral artery disease) ([facility] [address]  -will resume aspirin once h/h stable but would recommend to hold plavix  surgery     #Primary narcolepsy w +BS  MSK: moves all extremities  Neuro: Grossly normal, CN intact, sensory intact  Psych: Affect- normal  SKIN: dry scaly, flaking skin over entire body  EXT: no edema      Data Review:       Labs:     Recent Labs   Lab  01/04/19   0607   01/05/19   0566 ondansetron HCl

## 2019-01-07 ENCOUNTER — APPOINTMENT (OUTPATIENT)
Dept: PICC SERVICES | Facility: HOSPITAL | Age: 79
DRG: 466 | End: 2019-01-07
Attending: HOSPITALIST
Payer: MEDICARE

## 2019-01-07 PROCEDURE — 02HV33Z INSERTION OF INFUSION DEVICE INTO SUPERIOR VENA CAVA, PERCUTANEOUS APPROACH: ICD-10-PCS | Performed by: INTERNAL MEDICINE

## 2019-01-07 RX ORDER — ASPIRIN 81 MG/1
81 TABLET ORAL DAILY
Status: DISCONTINUED | OUTPATIENT
Start: 2019-01-07 | End: 2019-01-09

## 2019-01-07 RX ORDER — LORAZEPAM 0.5 MG/1
0.5 TABLET ORAL ONCE
Status: DISCONTINUED | OUTPATIENT
Start: 2019-01-07 | End: 2019-01-07

## 2019-01-07 RX ORDER — SODIUM CHLORIDE 0.9 % (FLUSH) 0.9 %
10 SYRINGE (ML) INJECTION AS NEEDED
Status: DISCONTINUED | OUTPATIENT
Start: 2019-01-07 | End: 2019-01-23

## 2019-01-07 RX ORDER — LIDOCAINE HYDROCHLORIDE 10 MG/ML
0.5 INJECTION, SOLUTION INFILTRATION; PERINEURAL ONCE AS NEEDED
Status: ACTIVE | OUTPATIENT
Start: 2019-01-07 | End: 2019-01-07

## 2019-01-07 NOTE — PROGRESS NOTES
Vascular Access Note inserted by Jessy Valencia RN    Vascular Access Screening:   Allergies to Lidocaine: no  Allergies to Latex: no  Presence of Pacemaker/Defibrillator: No  Mastectomy with Lymph Node Dissection: No  AV Fistula / AV Graft: No  Dialysis Cat

## 2019-01-07 NOTE — PROGRESS NOTES
[facility] Hospitalist Progress Note     CC: Hospital Follow up    PCP: Tamera King MD       Assessment/Plan:     Active Problems:    Wound infection    Mr. Dianelys Edge is a 70 yo male [address] [employer] sig for bladder ca s/p chemo, BPH, CKD 3, CAD, depression, HTN, HLD -> 7.1    - s/p 8 Units of pRBC's on 12/31, 1 unit on 1/2   - calcium 7,2 stable (monitor with so many pRBC's)  - received 1 unit pRBC 1/5/19    #LIN on CKD (chronic kidney disease) stage 3, GFR 30-59 ml/min ([facility])  -baseline creat 1.6, was 2.2 at OSH but i abnormalities, diastolic dys, mild MR, TR     #SAM on CPAP  -cpap protocol  -encourage use with all sleep     #PAD (peripheral artery disease) ([facility] [address]  -will resume aspirin once h/h stable but would recommend to hold plavix  surgery     #Primary narcolepsy w non-tender, non-distended, +BS  MSK: moves all extremities  Neuro: Grossly normal, CN intact, sensory intact  Psych: Affect- normal  SKIN: dry scaly, flaking skin over entire body  EXT: no edema      Data Review:       Labs:     Recent Labs   Lab  01/05/19

## 2019-01-07 NOTE — PLAN OF CARE
Patient's hip dressing noted to be saturated and peeling off. Also noted buzzing and the orange light flashing on the RANDAL pump. Attempted to change the dressing for a proper seal but noted the RANDAL tubings to be out as well as the hemovac drains.  Incision

## 2019-01-07 NOTE — PROGRESS NOTES
Attending addendum:  I have seen and examined patient, discussed with NP. Agree with assessment and plan as outlined below.         [facility]    Assessment and Plan:   ASSESSMENT:    1) R JOHN c/b recurrent infections, s/p hardware explant 0510   RBC  2.86*   HGB  8.4*   HCT  25.1*   MCV  87.7   MCH  29.3   MCHC  33.4   RDW  17.1*   WBC  7.8   PLT  138*     No results for input(s): INR in the last 168 hours.

## 2019-01-07 NOTE — PHYSICAL THERAPY NOTE
PHYSICAL THERAPY HIP TREATMENT NOTE - INPATIENT    Room Number: 442/442-A            Presenting Problem: (L hip revision)    Problem List  Active Problems:    Wound infection      PHYSICAL THERAPY ASSESSMENT   Pt is received in the bed and cleared for PT. Sitting: Fair -  Dynamic Sitting: Poor +  Static Standing: Not tested  Dynamic Standing: Not tested  ACTIVITY TOLERANCE                         O2 WALK                  AM-PAC '6-Clicks' INPATIENT SHORT FORM - BASIC MOBILITY  How much difficulty does the p chair 2 assist   Goal #2 Patient is able to demonstrate transfers Sit to/from Stand at assistance level: moderate assistance maintaining TTWB RLE with RW      Goal #2  Current Status NT   Goal #3 Patient is able to perform bed to/from chair transfers with

## 2019-01-07 NOTE — OCCUPATIONAL THERAPY NOTE
OCCUPATIONAL THERAPY TREATMENT NOTE - INPATIENT    Room Number: 442/442-A         Presenting Problem: (explant and spacer placement)     Problem List  Active Problems:    Wound infection      OCCUPATIONAL THERAPY ASSESSMENT     RN cleared pt for participat transfer training;Patient/Family education;Patient/Family training;Equipment eval/education; Compensatory technique education    SUBJECTIVE  Agreeable to activity     OBJECTIVE  Precautions: JOHN - posterior;Drain(s)    WEIGHT BEARING RESTRICTION  Weight Lucy  Comment: ongoing            Goals  on:19  Frequency:3-5x/week

## 2019-01-08 NOTE — PAYOR COMM NOTE
VRO:  [id number] - DAYS PENDING IN Johan Shen-  CLINICALS 12/28/18- 12/31/18 FAXED ON 12/31/18, FAXED CLINICALS 1/1/19- 1/2/19 ON 1/2/19, CLINICAL UPDATE 1/3/19- 1/4/19 FAXED ON 1/4/19 - FAXING UPDATE FOR 1/5/19- 1/7/18  REQUESTING ADDITIONAL DAYS      1/5/ surgery with acute blood loss  - intra op blood loss reportedly of 1.3- 2L  -prior hgb 7.8-. 7.2 -> 7.8- > 7.5 -> 6.9 (POST OP) -> 8.2-> 6.9 -> 1 unit ordered -> 8 -> 7.1 today  - s/p 8 Units of pRBC's on 12/31, 1 unit on 1/2   - calcium 7,2 stable (monito now     #Ischemic cardiomyopathy EF 55%  -monitor fluid status closely  -continue BB, resume arb when able  - ECHO 12/6/18  EF 50-55%, LVH mild, no sig valvular abnormalities, diastolic dys, mild MR, TR     #SAM on CPAP  -cpap protocol  -encourage use with 01/05/19   1115   RBC  2.60*   --   2.55*   --   2.39*   --    HGB  7.7*   < >  7.6*  7.6*  7.1*  7.1*   HCT  23.3*   --   22.7*   --   21.4*   --    MCV  89.6   --   89.0   --   89.7   --    MCH  29.4   --   29.9   --   29.7   --    MCHC  32.8   --   33.5 DC'ed to SNF on 12/22 with Krystin Rossi, who presented to [facility] [address] on 12/27 with weakness and drainage from wound concern for re-infection and was transferred to [place] 12/28 for further management, s/p explant of R JOHN 12/31     # Infected Right Hip  plavix for 28 days (while on lovneox) with bleeding concerns.     -BB continued   -statin continued  - severe CAD with diffuse LAD disease and Lcx with PCI and acute closure, stress test in 12/2017 was reported as normal  -patient with pre op eval by mary able     #Obesity- BMI 36  -o/p weight loss endeavors     FN:  - IVF: none  - Diet: reg     DVT Prophy: lovenox  Atrophy: IS  Lines: PIV         Subjective:      More confused this AM. Son at bedside.  Hg stable after 1 unit PRBC transfusion yesterday.     138   K  4.1  3.7  4.1   CL  109  107  106   CO2  22  24  25       Meds:      • furosemide  20 mg Intravenous Once   • ertapenem  1 g Intravenous Q24H   • fluconazole  200 mg Oral Q24H   • enoxaparin  40 mg Subcutaneous Nightly   • MINERIN   Topical Daily fever  - BC at OSH 1/2 bottles with [facility], will follow, request records  - IV vanc and meropenem ordered will continue  - on ppx oral vanco  - Ortho and ID consulted  - 12/31 s/p explant of right JOHN, with placement of antibiotic spacer with Dr. Caitlin Mayers on Dr. Jessica Juarez on 11/23/18 at [facility] cardiology institute, and was cleared for surgery   - recent ECHO done prior to surgery: ECHO 12/6/18  EF 50-55%, LVH mild, no sig valvular abnormalities, diastolic dys, mild MR, TR  - this would be patient 3rd dawson data filed at 1/7/2019 0610      Gross per 24 hour   Intake 90 ml   Output 3006 ml   Net -2916 ml            Exam    GEN: NAD, some confusion  HEENT: EOMI, PERRLA, R eye subconjunctival hemorrhage laterally improved  Neck: Supple, no JVD  Pulm: CTAB, no cr       Normal Saline Flush, Normal Saline Flush, TraMADol HCl, Normal Saline Flush, acetaminophen, PEG 3350, magnesium hydroxide, bisacodyl, ondansetron HCl

## 2019-01-08 NOTE — PROGRESS NOTES
[facility] HOSP - [facility]    Cardiology Progress Note    Sabino Meckel Patient Status:  Inpatient    1940 MRN [id number]   Location [facility] 4W/SW/SE Attending Josselin Pena MD   Hosp Day # 11 PCP Segundo Ferro MD       [facility] °C)  Pulse: 87  Resp: 20  BP: 130/55    Intake/Output:     Intake/Output Summary (Last 24 hours) at 1/8/2019 0840  Last data filed at 1/8/2019 0508  Gross per 24 hour   Intake 219 ml   Output 1450 ml   Net -1231 ml       Last 3 Weights  12/28/18 1956 : 234 7. 2*  7.3*  7.6*  7.4*  7.8*  7.9*  7.7*   MG  2.0  2.0   --    --    --    --    --    GLU  91  107*  101*  91  97  104*  101*       No results for input(s): ALT, AST, ALB, AMYLASE, LIPASE, LDH in the last 168 hours.     Invalid input(s): ALPHOS, TBIL, DBI MD  1/8/2019  8:40 AM

## 2019-01-08 NOTE — PLAN OF CARE
DISCHARGE PLANNING    • Discharge to home or other facility with appropriate resources Progressing    Planning d/c to [facility] when medically cleared- possibly tomorrow if patient less confused      Impaired Functional Mobility    • Achieve highest/safe

## 2019-01-08 NOTE — PHYSICAL THERAPY NOTE
PHYSICAL THERAPY HIP TREATMENT NOTE - INPATIENT    Room Number: 442/442-A            Presenting Problem: (L hip revision)    Problem List  Active Problems:    Wound infection      PHYSICAL THERAPY ASSESSMENT   Pt is received in the bed and cleared for PT. much help from another person does the patient currently need. ..   -   Moving to and from a bed to a chair (including a wheelchair)?: Total   -   Need to walk in hospital room?: Total   -   Climbing 3-5 steps with a railing?: Total     AM-PAC Score:  Raw S precautions and safety concerns independently   Goal #5   Current Status IN PROGRESS Educated   Goal #6 Patient independently performs home exercise program for ROM/strengthening per the instructions provided in preparation for discharge.    Goal #6  Darrell

## 2019-01-08 NOTE — PROGRESS NOTES
[facility] Hospitalist Progress Note     CC: Hospital Follow up    PCP: Steven Baugh MD       Assessment/Plan:     Active Problems:    Wound infection    Mr. Sagrario Cevallos is a 68 yo male [address] [employer] sig for bladder ca s/p chemo, BPH, CKD 3, CAD, depression, HTN, HLD -> 7.1    - s/p 8 Units of pRBC's on 12/31, 1 unit on 1/2, 1 unit 1/5   - received 1 unit pRBC 1/5/19  - d/w Dr. Chuckie Salmeron, hold lovenox for now    #LIN on CKD (chronic kidney disease) stage 3, GFR 30-59 ml/min ([facility])  -baseline creat 1.6, was 2.2 at OSH but imp diastolic dys, mild MR, TR     #SAM on CPAP  -cpap protocol  -encourage use with all sleep     #PAD (peripheral artery disease) ([facility] [address]  -will resume aspirin once h/h stable but would recommend to hold plavix  surgery     #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 cataplex RRR, no murmurs  ABD: Soft, non-tender, non-distended, +BS  MSK: moves all extremities  Neuro: Grossly normal, CN intact, sensory intact  Psych: Affect- normal  SKIN: dry scaly, flaking skin over entire body  EXT: no edema      Data Review:       Labs:

## 2019-01-09 ENCOUNTER — APPOINTMENT (OUTPATIENT)
Dept: CT IMAGING | Facility: HOSPITAL | Age: 79
DRG: 466 | End: 2019-01-09
Attending: ORTHOPAEDIC SURGERY
Payer: MEDICARE

## 2019-01-09 ENCOUNTER — APPOINTMENT (OUTPATIENT)
Dept: INTERVENTIONAL RADIOLOGY/VASCULAR | Facility: HOSPITAL | Age: 79
DRG: 466 | End: 2019-01-09
Attending: RADIOLOGY
Payer: MEDICARE

## 2019-01-09 ENCOUNTER — ANESTHESIA EVENT (OUTPATIENT)
Dept: INTERVENTIONAL RADIOLOGY/VASCULAR | Facility: HOSPITAL | Age: 79
End: 2019-01-09

## 2019-01-09 ENCOUNTER — ANESTHESIA (OUTPATIENT)
Dept: INTERVENTIONAL RADIOLOGY/VASCULAR | Facility: HOSPITAL | Age: 79
End: 2019-01-09

## 2019-01-09 PROCEDURE — 73700 CT LOWER EXTREMITY W/O DYE: CPT | Performed by: ORTHOPAEDIC SURGERY

## 2019-01-09 PROCEDURE — B41F1ZZ FLUOROSCOPY OF RIGHT LOWER EXTREMITY ARTERIES USING LOW OSMOLAR CONTRAST: ICD-10-PCS | Performed by: RADIOLOGY

## 2019-01-09 PROCEDURE — B41G1ZZ FLUOROSCOPY OF LEFT LOWER EXTREMITY ARTERIES USING LOW OSMOLAR CONTRAST: ICD-10-PCS | Performed by: RADIOLOGY

## 2019-01-09 PROCEDURE — 90792 PSYCH DIAG EVAL W/MED SRVCS: CPT | Performed by: OTHER

## 2019-01-09 RX ORDER — 0.9 % SODIUM CHLORIDE 0.9 %
VIAL (ML) INJECTION
Status: COMPLETED
Start: 2019-01-09 | End: 2019-01-09

## 2019-01-09 RX ORDER — LIDOCAINE HYDROCHLORIDE 20 MG/ML
INJECTION, SOLUTION EPIDURAL; INFILTRATION; INTRACAUDAL; PERINEURAL
Status: COMPLETED
Start: 2019-01-09 | End: 2019-01-09

## 2019-01-09 RX ORDER — SODIUM CHLORIDE 9 MG/ML
INJECTION, SOLUTION INTRAVENOUS
Status: DISPENSED
Start: 2019-01-09 | End: 2019-01-09

## 2019-01-09 RX ORDER — ESCITALOPRAM OXALATE 10 MG/1
10 TABLET ORAL DAILY
Status: DISCONTINUED | OUTPATIENT
Start: 2019-01-10 | End: 2019-01-23

## 2019-01-09 RX ORDER — SODIUM CHLORIDE 0.9 % (FLUSH) 0.9 %
10 SYRINGE (ML) INJECTION AS NEEDED
Status: DISCONTINUED | OUTPATIENT
Start: 2019-01-09 | End: 2019-01-10

## 2019-01-09 RX ORDER — SODIUM CHLORIDE 9 MG/ML
INJECTION, SOLUTION INTRAVENOUS ONCE
Status: COMPLETED | OUTPATIENT
Start: 2019-01-09 | End: 2019-01-09

## 2019-01-09 RX ORDER — DIPHENHYDRAMINE HYDROCHLORIDE 50 MG/ML
INJECTION INTRAMUSCULAR; INTRAVENOUS
Status: DISCONTINUED
Start: 2019-01-09 | End: 2019-01-09 | Stop reason: WASHOUT

## 2019-01-09 RX ORDER — SODIUM CHLORIDE 9 MG/ML
INJECTION, SOLUTION INTRAVENOUS
Status: COMPLETED
Start: 2019-01-09 | End: 2019-01-09

## 2019-01-09 RX ORDER — MIDAZOLAM HYDROCHLORIDE 1 MG/ML
INJECTION INTRAMUSCULAR; INTRAVENOUS
Status: DISCONTINUED
Start: 2019-01-09 | End: 2019-01-09 | Stop reason: WASHOUT

## 2019-01-09 NOTE — PLAN OF CARE
Patient: Linda Carvajal  MRN: [id number]  : 1940  Allergies: No Known Allergies      IR MD/ APN Pre-Procedure Plan  (Inpatients Only)    Date of IR Procedure: 2019  Procedure to be performed: Right leg angiogram with possible intervention  Lat

## 2019-01-09 NOTE — CONSULTS
NEPHROLOGY CONSULT NOTE     DATE: 1/9/2019    Requesting Physician: Dr. Virgel Cheadle     Reason for Consult: LIN on CKD      HISTORY OF PRESENT ILLNESS: Kayden Gaytan is 69year old male with history of HTN, HLD, CKD stage 3 (prior to admission sCr had ran REPLACEMENT Left 98, 2014       FAMILY HISTORY:   Family History   Problem Relation Age of Onset   • Hypertension Father    • Heart Disorder Father    • Heart Disorder Mother         MI   • Cancer Mother         Breast CA   • Heart Disorder Brother Oral BID   escitalopram (LEXAPRO) tablet 20 mg 20 mg Oral Daily   ferrous sulfate EC tab 325 mg 325 mg Oral BID with meals   Metoprolol Succinate ER (Toprol XL) 24 hr tab 25 mg 25 mg Oral Daily   Pantoprazole Sodium (PROTONIX) EC tab 20 mg 20 mg Oral QAM A Tab Take 20 mg by mouth daily. [DISCONTINUED] Acetaminophen-Codeine #3 300-30 MG Oral Tab Take 1-2 tablets by mouth every 4 (four) hours as needed for Pain.    [DISCONTINUED] aspirin  MG Oral Tab EC Take 1 tablet (325 mg total) by mouth 2 (two) time anemia requiring blood transfusion.    Also with recent  IV contrast with angiogram on 1/9   - urine Na 24 and FeNa consistent with pre-renal state  - check UA   - will give 500 cc of NS at 50cc/hr and reassess   - follow renal fxn and I/Os    - avoid hypot

## 2019-01-09 NOTE — CM/SW NOTE
Francine Salmeron  Ph: [phone]  Fax: [phone]  Case ID #: [id number]    [facility] [place]    PLAN: [facility] CLAUDIA Herrera, [address]

## 2019-01-09 NOTE — CM/SW NOTE
Information resubmitted to [employer] for insurance re-auth. Insurance approval has been obtained. Insurance [facility] is [id number] and it is good from 1/9/19-1/11/19 for [facility]. The pt. Is not medically stable at this time.   SW will continue to fol

## 2019-01-09 NOTE — PROGRESS NOTES
[facility] Hospitalist Progress Note     CC: Hospital Follow up    PCP: Nya Bowie MD       Assessment/Plan:     Active Problems:    Wound infection    Mr. Helane Lundborg is a 70 yo male [address] [employer] sig for bladder ca s/p chemo, BPH, CKD 3, CAD, depression, HTN, HLD intra op blood loss reportedly of 1.3- 2L  -prior hgb 7.8-. 7.2 -> 7.8- > 7.5 -> 6.9 (POST OP) -> 8.2-> 6.9 -> 1 unit ordered -> 8 -> 7.1    - s/p 8 Units of pRBC's on 12/31, 1 unit on 1/2, 1 unit 1/5, 1 unit 1/9  - received 1 unit pRBC 1/5/19  - d/w Dr. Sharifa Rivera prior admit as pt was having insomnia while hospitalized and at facility      #Chronic bilateral low back pain with bilateral sciatica/chronic opiate use  -T3#, OXY and prn morphine for now  - wean as able     #Obesity- BMI 36  -o/p weight loss endeavors 240 ml   Output 275 ml   Net -35 ml       Last 3 Weights  12/28/18 1956 : 234 lb 3.2 oz (106.2 kg)  12/17/18 1544 : 225 lb 9.6 oz (102.3 kg)  12/06/18 1118 : 228 lb (103.4 kg)  11/30/18 1456 : 226 lb (102.5 kg)      Exam    GEN: NAD, sleepy, less confused residual femur in this region there is a soft tissue dense focus measuring approximately 55 x 32 x 70 mm likely representing a combination of medullary bone and blood products.   Known cortical fractures involving the mid and distal shaft of the right femur magnesium hydroxide, bisacodyl, ondansetron HCl

## 2019-01-09 NOTE — WOUND PROGRESS NOTE
Pt seen for incisional wound vac placement per Dr. Elspeth Nyhan. Pt is s/p \"EXPLANT RIGHT TOTAL HIP ARTHROPLASTY WITH PLACEMENT OF ANTIBIOTIC SPACER \"  Bedside Rn in room to assist with positioning patient on the left side for right hip wound vac placement.

## 2019-01-09 NOTE — CONSULTS
[facility]    Report of Consultation    Kolton Herrera Patient Status:  Inpatient    1940 MRN [id number]   Location [facility] 4W/SW/SE Attending Cruz Moore MD   Hosp Day # 12 PCP Sherrell Centeno MD     Date of himself and he has been taking his CPAP machine off at night. Patient exhibited response to internal stimuli. Patient deny any substance abuse or withdrawal syndrome.   Patient denied any homicidal or suicidal ideation.     Information from family that PROCEDURE UNLISTED      fusion   • TOTAL HIP REPLACEMENT Right 98, 99   • TOTAL HIP REPLACEMENT Left 98, 2014       Family History  Family History   Problem Relation Age of Onset   • Hypertension Father    • Heart Disorder Father    • Heart Disorder Mother Oral Nightly   Normal Saline Flush 0.9 % injection 3 mL 3 mL Intravenous PRN   acetaminophen (TYLENOL) tab 650 mg 650 mg Oral Q6H PRN   PEG 3350 (MIRALAX) powder packet 17 g 17 g Oral Daily PRN   magnesium hydroxide (MILK OF MAGNESIA) 400 MG/5ML suspension Laboratory Data:  Lab Results   Component Value Date    WBC 6.3 01/09/2019    HGB 7.3 (L) 01/09/2019    HCT 21.7 (L) 01/09/2019     01/09/2019    CREATSERUM 2.11 (H) 01/09/2019    BUN 35 (H) 01/09/2019     01/09/2019    K 4.0 01/09/2019 determine the density of this fluid. Correlate for any drainage.    Dictated by (CST): Anoop Saba MD on 1/09/2019 at 17:17     Approved by (CST): Anoop Saba MD on 1/09/2019 at 17:31            Vital Signs:     Blood pressure 121/56, pulse 83, temperatu Visit:  Orders Placed This Encounter      Vancomycin Trough, Serum      Magnesium      Comp Metabolic Panel (14)      CBC With Differential With Platelet      Prothrombin Time (PT)      Sed Rate, Westergren (Automated)      C-Reactive Protein      Basic Me Once      Specimen to Pathology, Tissue      MRSA Screen by PCR Once      MRSA Screen by PCR Once      Anaerobic Culture      Anaerobic Culture      Anaerobic Culture      Tissue Aerobic Culture      Tissue Aerobic Culture      Tissue Aerobic Culture

## 2019-01-09 NOTE — PROGRESS NOTES
Attending addendum:  I have seen and examined patient, discussed with NP  Agree with assessment and plan as below. Hgb continues to drop post-op, to IR today for evaluation of bleeding source.   Hold anti-platelet therapy  Remainder as below      Torito Tomass Bladder spasms     Ischemic cardiomyopathy     Malignant neoplasm of urinary bladder, unspecified site [facility])     Chronic bilateral low back pain with bilateral sciatica     Preop testing     Delirium due to another medical condition     Major depressive d Labs   Lab  01/03/19   0459   01/05/19   0550  01/06/19   0510  01/07/19   0510  01/08/19   0615  01/09/19   0550   NA  137   < >  139  138  138  140  138   K  3.9   < >  3.7  4.1  4.0  4.1  4.0   CL  110   < >  107  106  105  103  103   CO2  20*   < >  24 (SENOKOT) tab TABS 17.2 mg 17.2 mg Oral Nightly   Normal Saline Flush 0.9 % injection 3 mL 3 mL Intravenous PRN   acetaminophen (TYLENOL) tab 650 mg 650 mg Oral Q6H PRN   PEG 3350 (MIRALAX) powder packet 17 g 17 g Oral Daily PRN   magnesium hydroxide (MILK

## 2019-01-09 NOTE — BRIEF PROCEDURE NOTE
[facility] HOSP - [facility]  Procedure Note    Chelsi Austin Patient Status:  Inpatient    1940 MRN [id number]   Location [facility] Attending Taylor Gallo MD   Hosp Day # 12 PCP Nguyễn Ibarra MD     Pro

## 2019-01-09 NOTE — PLAN OF CARE
ANXIETY    • Will report anxiety at manageable levels Not Progressing        CONFUSION    • Confusion, delirium, dementia or psychosis is improved or at baseline Not Progressing        Impaired Cognition    • Patient will exhibit improved attention, though precautions maintained.

## 2019-01-09 NOTE — OCCUPATIONAL THERAPY NOTE
Attempted to see pt for OT tx. RN Sharyn Love) requests hold therapy due to persistent bleeding and cont'd work up. Pt went for an arteriogram this morning. Will HOLD and f/u tomorrow (1/10) as appropriate.

## 2019-01-09 NOTE — PROGRESS NOTES
Procedure hand off report given to Isha Wright RN. Procedural access site is dry and intact with not signs and symptoms of bleeding and hematoma. Groin check done with Isha Wright RN, upon transfer to floor. Pt is back to his baseline.

## 2019-01-09 NOTE — PROGRESS NOTES
[address] Patient Status:  Inpatient    1940 MRN [id number]   Location [facility] 2W/SW Attending Alena Garner MD   Hosp Day # 12 PCP Whit Marin MD     Subjective:  Procedure(s):  EXPLANT RIGHT TOTAL HIP ARTH Facility-Administered Medications:  Normal Saline Flush 0.9 % injection 10 mL 10 mL Intravenous PRN   0.9%  NaCl infusion  Intravenous Once   sodium chloride 0.9 % infusion      Normal Saline Flush 0.9 % injection 10 mL 10 mL Intravenous PRN   ertapenem (I

## 2019-01-10 RX ORDER — ACETAMINOPHEN 325 MG/1
650 TABLET ORAL ONCE
Status: COMPLETED | OUTPATIENT
Start: 2019-01-10 | End: 2019-01-10

## 2019-01-10 RX ORDER — SODIUM CHLORIDE 9 MG/ML
INJECTION, SOLUTION INTRAVENOUS ONCE
Status: COMPLETED | OUTPATIENT
Start: 2019-01-10 | End: 2019-01-10

## 2019-01-10 RX ORDER — SODIUM CHLORIDE 0.9 % (FLUSH) 0.9 %
10 SYRINGE (ML) INJECTION AS NEEDED
Status: DISCONTINUED | OUTPATIENT
Start: 2019-01-10 | End: 2019-01-20

## 2019-01-10 NOTE — PROGRESS NOTES
[facility] Hospitalist Progress Note     CC: Hospital Follow up    PCP: Ginny Escalante MD       Assessment/Plan:     Active Problems:    Wound infection    Mr. Shiv Fuentes is a 68 yo male [address] [employer] sig for bladder ca s/p chemo, BPH, CKD 3, CAD, depression, HTN, HLD intra op blood loss reportedly of 1.3- 2L  -prior hgb 7.8-. 7.2 -> 7.8- > 7.5 -> 6.9 (POST OP) -> 8.2-> 6.9 -> 1 unit ordered -> 8 -> 7.1  ----> 6.3 -> 1 unit-> 7.3 -> 6.9  - s/p 8 Units of pRBC's on 12/31, 1 unit on 1/2, 1 unit 1/5, 1 unit 1/9, 1 unit 1/1 surgery     #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 cataplexy  -provigil was held since prior admit as pt was having insomnia while hospitalized and at facility      #Chronic bilateral low back pain with bilateral sciatica/chronic opiate use  -T3#, OXY and prn morphin 24 hours) at 1/10/2019 1029  Last data filed at 1/10/2019 0848  Gross per 24 hour   Intake 900 ml   Output 1275 ml   Net -375 ml       Last 3 Weights  12/28/18 1956 : 234 lb 3.2 oz (106.2 kg)  12/17/18 1544 : 225 lb 9.6 oz (102.3 kg)  12/06/18 1118 : 228 l tissue dense focus measuring approximately 55 x 32 x 70 mm likely representing a combination of medullary bone and blood products.   Known cortical fractures involving the mid and distal shaft of the right femur are redemonstrated, which are encircled by ce

## 2019-01-10 NOTE — WOUND PROGRESS NOTE
Pt seen for wound vac check. Pt offers no complaints re wound vac. Dressing to right hip is c/d/i. There is a moderate amount of serosanguinous drainage in canister. Wound vac is running at 100 mmHg continuous pressure.  Next wound vac dressing change is pl

## 2019-01-10 NOTE — PLAN OF CARE
ANXIETY    • Will report anxiety at manageable levels Progressing        CONFUSION    • Confusion, delirium, dementia or psychosis is improved or at baseline Progressing        DISCHARGE PLANNING    • Discharge to home or other facility with appropriate re

## 2019-01-10 NOTE — PROGRESS NOTES
Attending addendum:  I have seen and examined patient, discussed with NP  Agree with assessment and plan as below. Hgb continues to drop post-op, to IR today for evaluation of bleeding source.   Hold anti-platelet therapy  Remainder as below      Howe adenopathy. Neck: No JVD, carotids 1+, no bruits. Cardiac: Regular rate and rhythm. S1, S2 normal. No murmur, pericardial rub, S3, thrill, heave or extra cardiac sounds. Lungs: Clear without wheezes, rales, rhonchi or dullness.   Diminished throughoutAbd (DIFLUCAN) tab 200 mg 200 mg Oral Q24H   minerin (EUCERIN) cream  Topical Daily   TraMADol HCl (ULTRAM) tab 50 mg 50 mg Oral Q6H PRN   nystatin (MYCOSTATIN) 535472 UNIT/GM cream  Topical BID   allopurinol (ZYLOPRIM) tab 300 mg 300 mg Oral Daily   docusate

## 2019-01-10 NOTE — PHYSICAL THERAPY NOTE
PHYSICAL THERAPY HIP TREATMENT NOTE - INPATIENT    Room Number: 442/442-A            Presenting Problem: (L hip revision)    Problem List  Active Problems:    Wound infection      PHYSICAL THERAPY ASSESSMENT   Pt is received in the bed and cleared for PT. side of the bed?: A Lot   How much help from another person does the patient currently need. ..   -   Moving to and from a bed to a chair (including a wheelchair)?: Total   -   Need to walk in hospital room?: Total   -   Climbing 3-5 steps with a railing?: and/or demonstrates all precautions and safety concerns independently   Goal #5   Current Status IN PROGRESS Educated   Goal #6 Patient independently performs home exercise program for ROM/strengthening per the instructions provided in preparation for disc

## 2019-01-10 NOTE — PROGRESS NOTES
NEPHROLOGY DAILY PROGRESS NOTE     Follow up Reason: LIN     SUBJECTIVE:  Received 1 u pRBCs today for drop in Hb  Continues to have RLE pain   Denies SOB, remains on some oxygen       OBJECTIVE:    Total Intake/Output:    Intake/Output Summary (Last 24 ho mg 650 mg Oral Q6H PRN   PEG 3350 (MIRALAX) powder packet 17 g 17 g Oral Daily PRN   magnesium hydroxide (MILK OF MAGNESIA) 400 MG/5ML suspension 30 mL 30 mL Oral Daily PRN   bisacodyl (DULCOLAX) rectal suppository 10 mg 10 mg Rectal Daily PRN   Amanda Tai 01/10/2019    BUN 32 (H) 01/10/2019    CREATSERUM 1.78 (H) 01/10/2019    WBC 4.9 01/10/2019    HCT 20.8 (L) 01/10/2019     01/10/2019       IMAGING:  Ct Lower Ext Right (wo Iv)(cpt=73700)    Result Date: 1/9/2019  CONCLUSION:  1.   Post right hip art continued anemia and has required multiple blood transfusions. He is s/p  R leg angiogram on 1/9.   Nephrology is consulted for LIN    Non-oliguric LIN on CKD   - prior to admission sCr had ranged from 1.5-1.8. sCr now worsening to 2.11   - etiology of LIN

## 2019-01-10 NOTE — PAYOR COMM NOTE
QDT:  [id number] - APPROVED 12/28/19-1/2/19 PER IEXCHANGE-  FAXED CLINICALS 1/1/19- 1/2/19 ON 1/2/19, CLINICAL UPDATE 1/3/19- 1/4/19 FAXED ON 1/4/19 - FAXED UPDATE FOR 1/5/19- 1/7/18 ON 1/8/19, FAXING UPDATE 1/8/19-1/10/19  REQUESTING ADDITIONAL DAYS surgery with acute blood loss  - intra op blood loss reportedly of 1.3- 2L  -prior hgb 7.8-. 7.2 -> 7.8- > 7.5 -> 6.9 (POST OP) -> 8.2-> 6.9 -> 1 unit ordered -> 8 -> 7.1    - s/p 8 Units of pRBC's on 12/31, 1 unit on 1/2, 1 unit 1/5   - received 1 unit pR now     #Ischemic cardiomyopathy EF 55%  -monitor fluid status closely  -continue BB, resume arb when able  - ECHO 12/6/18  EF 50-55%, LVH mild, no sig valvular abnormalities, diastolic dys, mild MR, TR     #SAM on CPAP  -cpap protocol  -encourage use with WBC  7.1  7.8  7.0   PLT  127*  138*  135*                  Recent Labs   Lab  01/06/19   0510  01/07/19   0510  01/08/19   0615   GLU  97  104*  101*   BUN  24*  23*  25*   CREATSERUM  1.38  1.51*  1.53*   GFRAA  57*  51*  50*   GFRNAA  49*  44*  43* head exchange and antibiotic beads on 12/18, DC'ed on IV invanz  - ID followed last admit, PICC line placed 12/18, plan for 6 weeks IV abx with invanz and plan on po suppression thereafter, prior OR cx with enterobacter aerogenes, also on IV micafungin for concerns.     -BB continued   -statin continued  - severe CAD with diffuse LAD disease and Lcx with PCI and acute closure, stress test in 12/2017 was reported as normal  -patient with pre op eval by primary cardiologist on Dr. Sujatha Jorgensen on 11/23/18 at [facility]     FN:  - IVF: none  - Diet: reg     DVT Prophy: lovneox  Atrophy: IS  Lines: PIV       Subjective:      Hg 6.3 today, continues to have oozing from incision site.      OBJECTIVE:     Blood pressure 121/56, pulse 83, temperature 97.4 °F (36.3 Elongated right femoral stem is present.   There is a known fracture around the intertrochanteric region of the femoral component, and between the anterior aspect of the arthroplasty in the residual femur in this region there is a soft tissue dense focus me Oral Nightly   • Vancomycin HCl  125 mg Oral Daily   • Senna  17.2 mg Oral Nightly              1/10/19   Assessment/Plan:      Active Problems:    Wound infection     Mr. Nancy Lange is a 70 yo male [employer] sig for bladder ca s/p chemo, BPH, CKD 3, CAD, HTN, loss  - intra op blood loss reportedly of 1.3- 2L  -prior hgb 7.8-. 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

## 2019-01-11 PROCEDURE — 99233 SBSQ HOSP IP/OBS HIGH 50: CPT | Performed by: OTHER

## 2019-01-11 RX ORDER — QUETIAPINE 100 MG/1
100 TABLET, FILM COATED ORAL NIGHTLY
Status: DISCONTINUED | OUTPATIENT
Start: 2019-01-11 | End: 2019-01-23

## 2019-01-11 NOTE — OCCUPATIONAL THERAPY NOTE
Pt not seen for OT at this time secondary to pt in bed, mumbling with swearing, not oriented . Attempted to orient pt, with pt stating 'well just get out and get in your car'. Pt appearing agitated with nursing notified.  Will follow up with pt next date as

## 2019-01-11 NOTE — PROGRESS NOTES
NEPHROLOGY DAILY PROGRESS NOTE     Follow up Reason: LIN     SUBJECTIVE:  Resting in bed   Continues to have pain and is asking for pain medication      OBJECTIVE:    Total Intake/Output:    Intake/Output Summary (Last 24 hours) at 1/11/2019 2145  Last chepe powder packet 17 g 17 g Oral Daily PRN   magnesium hydroxide (MILK OF MAGNESIA) 400 MG/5ML suspension 30 mL 30 mL Oral Daily PRN   bisacodyl (DULCOLAX) rectal suppository 10 mg 10 mg Rectal Daily PRN   ondansetron HCl (ZOFRAN) injection 4 mg 4 mg Intraveno 1.71 (H) 01/11/2019    WBC 5.0 01/11/2019    HCT 23.1 (L) 01/11/2019     01/11/2019       IMAGING:  Ct Lower Ext Right (wo Iv)(cpt=73700)    Result Date: 1/9/2019  CONCLUSION:  1. Post right hip arthroplasty.   Elongated right femoral stem is presen transfusions. He is s/p  R leg angiogram on 1/9.   Nephrology is consulted for LIN    Non-oliguric LIN on CKD   - prior to admission sCr had ranged from 1.5-1.8. sCr now worsened to 2.11   - etiology of LIN may be 2/2 renal hypoperfusion in the setting of a

## 2019-01-11 NOTE — PLAN OF CARE
Repositioned as tolerated. Skin care and pericare provided. Leal catheter intact. Wound vac to right hip intact. Tolerating PO intake. Confused and agitated at times, reoriented as needed.

## 2019-01-11 NOTE — WOUND PROGRESS NOTE
Pt seen for wound vac change, PCT present to assist with positioning. Pt has a wound vac over the staples in the right hip. There is a moderate amount of serosanguinous drainage in the canister.  The vac dressing was removed, wound cleansed with saline, ski

## 2019-01-11 NOTE — OCCUPATIONAL THERAPY NOTE
Chart reviewed. Upon arrival, pt not in room. Discussed with TAJ Rayo. Pt is off unit at interventional radiology for aspiration of R hip. Will follow-up this PM as pt is appropriate.

## 2019-01-11 NOTE — PHYSICAL THERAPY NOTE
PHYSICAL THERAPY HIP TREATMENT NOTE - INPATIENT    Room Number: 442/442-A            Presenting Problem: (L hip revision)    Problem List  Active Problems:    Wound infection      PHYSICAL THERAPY ASSESSMENT   Pt is received in the bed and cleared for PT. on and standing up from a chair with arms (e.g., wheelchair, bedside commode, etc.): Unable   -   Moving from lying on back to sitting on the side of the bed?: A Lot   How much help from another person does the patient currently need. ..   -   Moving to and discharge.    Goal #6  Current Status IN PROGRESS

## 2019-01-11 NOTE — PROGRESS NOTES
Patient is a 69year old  male with history of bladder cancer, BPH, CKD, CAD, HTN and depression who presented to the hospital for wound infection.      Consult Duration     The patient seen for 35 minutes over 50% consulting and managing treatme ECHO 8/17, EF > 50%, concentric LVH   • ARRIOLA (nonalcoholic steatohepatitis) 2016   • Obesity, Class III, BMI 40-49.9 (morbid obesity) ([facility] [address]    • Osteoarthritis    • Peripheral vascular disease ([facility] [address] 2016   • Personal history of antineoplastic chemotherapy Daily   TraMADol HCl (ULTRAM) tab 50 mg 50 mg Oral Q6H PRN   nystatin (MYCOSTATIN) 901907 UNIT/GM cream  Topical BID   allopurinol (ZYLOPRIM) tab 300 mg 300 mg Oral Daily   docusate sodium (COLACE) cap 100 mg 100 mg Oral BID   ferrous sulfate EC tab 325 mg Succinate ER 25 MG Oral Tablet 24 Hr Take 1 tablet by mouth daily. Pravastatin Sodium 20 MG Oral Tab Take 20 mg by mouth daily.    [DISCONTINUED] Acetaminophen-Codeine #3 300-30 MG Oral Tab Take 1-2 tablets by mouth every 4 (four) hours as needed for Pain shows gross anatomic position. 2.  There are skin staples over the lateral aspect of the right hip.   Within the subcutaneous fat beneath the skin staples there is a fluid collection measuring approximately 6 x 3 x 20 cm (AP x transverse x CC) however it is condition who presented to the hospital for wound infection. Patient exhibiting alternation in his mood and cognition. At this point, I would recommend the following approach:      1. Focus on education and support.   2.  Psychotherapy focusing on insi Random Once      Arterial blood gas      Hemoglobin & Hematocrit      Urinalysis with Culture Reflex Once      Hemoglobin      Basic Metabolic Panel (8)      CBC With Differential With Platelet      Magnesium      Urinalysis with Culture Reflex Once      I

## 2019-01-11 NOTE — PROGRESS NOTES
[facility] Hospitalist Progress Note     CC: Hospital Follow up    PCP: Henry Siemens, MD       Assessment/Plan:     Active Problems:    Wound infection    Mr. Tracie Rodríguez is a 70 yo male [address] [employer] sig for bladder ca s/p chemo, BPH, CKD 3, CAD, depression, HTN, HLD only on tylenol and occasionally on tramadol for pain  - Psyc following  - likely multifactorial delirium     Acute blood loss anemia  -multifactorial with CKD, infection, recent surgery with acute blood loss  - intra op blood loss reportedly of 1.3- 2L  - dys, mild MR, TR     #SAM on CPAP  -cpap protocol  -encourage use with all sleep     #PAD (peripheral artery disease) ([facility] [address]  -will resume aspirin once h/h stable but would recommend to hold plavix  surgery     #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 cataplexy  -provig [98.7 °F (37.1 °C)-98.9 °F (37.2 °C)] 98.7 °F (37.1 °C)  Pulse:  [] 101  Resp:  [16-18] 16  BP: (106-124)/(55-90) 106/72      Intake/Output:    Intake/Output Summary (Last 24 hours) at 1/11/2019 1510  Last data filed at 1/10/2019 1708  Gross per 24 h fracture around the intertrochanteric region of the femoral component, and between the anterior aspect of the arthroplasty in the residual femur in this region there is a soft tissue dense focus measuring approximately 55 x 32 x 70 mm likely representing a acetaminophen, PEG 3350, magnesium hydroxide, bisacodyl, ondansetron HCl

## 2019-01-11 NOTE — PROGRESS NOTES
Attending addendum:  I have seen and examined patient, discussed with NP  Agree with assessment and plan as below. Hgb continues to drop post-op, to IR today for evaluation of bleeding source.   Hold anti-platelet therapy  Remainder as below      [facility] dullness. Diminished throughoutAbdomen: Soft, non-tender. No organosplenomegally, mass or rebound, BS-present. Extremities: Without clubbing, cyanosis or edema. Peripheral pulses are 2+. Neurologic: No motor or coordinational deficit.   Skin: Warm and d allopurinol (ZYLOPRIM) tab 300 mg 300 mg Oral Daily   docusate sodium (COLACE) cap 100 mg 100 mg Oral BID   ferrous sulfate EC tab 325 mg 325 mg Oral BID with meals   Metoprolol Succinate ER (Toprol XL) 24 hr tab 25 mg 25 mg Oral Daily   Pantoprazole Sod

## 2019-01-12 NOTE — PROGRESS NOTES
[facility] Hospitalist Progress Note     CC: Hospital Follow up    PCP: Whit Marin MD       Assessment/Plan:     Active Problems:    Wound infection    Episodic mood disorder [facility])    Mr. Kina Veliz is a 68 yo male [address] [employer] sig for bladder ca s/p chemo, BPH, no signs of acute infection  - only on tylenol and occasionally on tramadol for pain  - Psyc following  - likely multifactorial delirium     Acute blood loss anemia  -multifactorial with CKD, infection, recent surgery with acute blood loss  - intra op bloo ECHO 12/6/18  EF 50-55%, LVH mild, no sig valvular abnormalities, diastolic dys, mild MR, TR     #SAM on CPAP  -cpap protocol  -encourage use with all sleep     #PAD (peripheral artery disease) ([address]  -will resume aspirin once h/h stable but would recommen resp. rate 20, height 5' 6\" (1.676 m), weight 234 lb 3.2 oz (106.2 kg), SpO2 99 %.     Temp:  [96.6 °F (35.9 °C)-98.6 °F (37 °C)] 98.6 °F (37 °C)  Pulse:  [65-78] 65  Resp:  [18-20] 20  BP: (136-138)/(50-56) 136/56      Intake/Output:    Intake/Output Summ a known fracture around the intertrochanteric region of the femoral component, and between the anterior aspect of the arthroplasty in the residual femur in this region there is a soft tissue dense focus measuring approximately 55 x 32 x 70 mm likely repres Saline Flush, acetaminophen, PEG 3350, magnesium hydroxide, bisacodyl, ondansetron HCl

## 2019-01-12 NOTE — PHYSICAL THERAPY NOTE
PHYSICAL THERAPY HIP TREATMENT NOTE - INPATIENT    Room Number: 442/442-A            Presenting Problem: (L hip revision)    Problem List  Active Problems:    Wound infection    Episodic mood disorder (HCC)      PHYSICAL THERAPY ASSESSMENT   Pt is received bedclothes, sheets and blankets)?: A Lot   -   Sitting down on and standing up from a chair with arms (e.g., wheelchair, bedside commode, etc.): Unable   -   Moving from lying on back to sitting on the side of the bed?: A Lot   How much help from another p program for ROM/strengthening per the instructions provided in preparation for discharge.    Goal #6  Current Status IN PROGRESS

## 2019-01-12 NOTE — PLAN OF CARE
Patient confused and agitated in the evening. Required frequent reorientation. Repositioned with pillow support. Incontinent of urine and stool, pericare provided. Lotion applied throughout body.  Patient finally able to settle himself down and slept soundl

## 2019-01-12 NOTE — PROGRESS NOTES
[facility]    Cardiology Progress Note    Kolton Herrera Patient Status:  Inpatient    1940 MRN [id number]   Location [facility] 4W/SW/SE Attending Arturo Stanley MD   Hosp Day # 15 PCP Sherrell Centeno MD       Impression/Kathy ([address]      Objective:   Temp: 96.6 °F (35.9 °C)  Pulse: 78  Resp: 18  BP: 138/52    Intake/Output:     Intake/Output Summary (Last 24 hours) at 1/12/2019 1140  Last data filed at 1/12/2019 0900  Gross per 24 hour   Intake 297 ml   Output 700 ml   Net -403 7. 7*  7.6*  7.6*  7.9*  8.3*   MG   --    --    --    --   1.9   GLU  101*  107*  95  100*  101*       No results for input(s): ALT, AST, ALB, AMYLASE, LIPASE, LDH in the last 168 hours.     Invalid input(s): ALPHOS, TBIL, DBIL, TPROT    No results for inpu

## 2019-01-13 RX ORDER — 0.9 % SODIUM CHLORIDE 0.9 %
VIAL (ML) INJECTION
Status: COMPLETED
Start: 2019-01-13 | End: 2019-01-13

## 2019-01-13 RX ORDER — ASPIRIN 81 MG/1
81 TABLET ORAL DAILY
Status: DISCONTINUED | OUTPATIENT
Start: 2019-01-13 | End: 2019-01-23

## 2019-01-13 RX ORDER — MAGNESIUM OXIDE 400 MG (241.3 MG MAGNESIUM) TABLET
400 TABLET ONCE
Status: COMPLETED | OUTPATIENT
Start: 2019-01-13 | End: 2019-01-13

## 2019-01-13 NOTE — PLAN OF CARE
Impaired Functional Mobility    • Achieve highest/safest level of mobility/gait Not Progressing          ANXIETY    • Will report anxiety at manageable levels Progressing        CONFUSION    • Confusion, delirium, dementia or psychosis is improved or at ba

## 2019-01-13 NOTE — PROGRESS NOTES
[address] [facility] [address] Patient Status:  Inpatient    1940 MRN [id number]   Location [facility] 2W/SW Attending Cris Jiménez MD   Hosp Day # 12 PCP Tamera King MD     Subjective:  Procedure(s):  EXPLANT RIGHT TOTAL HIP ARTH Facility-Administered Medications:  magnesium oxide (MAG-OX) tab 400 mg 400 mg Oral Once   QUEtiapine Fumarate (SEROQUEL) tab 100 mg 100 mg Oral Nightly   Normal Saline Flush 0.9 % injection 10 mL 10 mL Intravenous PRN   escitalopram (LEXAPRO) tablet 10 mg

## 2019-01-13 NOTE — PHYSICAL THERAPY NOTE
PHYSICAL THERAPY HIP TREATMENT NOTE - INPATIENT    Room Number: 442/442-A            Presenting Problem: (L hip revision)    Problem List  Active Problems:    Wound infection    Episodic mood disorder ([facility] [address]      PHYSICAL THERAPY ASSESSMENT     RN Ciera Jaxtr is activity: pt unable to rate but expressed p)  Location: R medial aspect of thigh and groin  Management Techniques: Activity promotion; Body mechanics; Relaxation;Repositioning    BALANCE  Static Sitting: Fair +  Dynamic Sitting: Fair  Static Standing: Depend moderate assistance maintaining TTWB RLE with RW      Goal #2  Current Status Max A x 2   Goal #3 Patient is able to perform bed to/from chair transfers with RW with moderate assistance and maintain TTWB RLE   Goal #3   Current Status SPT, max A x 2, 100%

## 2019-01-13 NOTE — PLAN OF CARE
ANXIETY    • Will report anxiety at manageable levels Progressing        CONFUSION    • Confusion, delirium, dementia or psychosis is improved or at baseline NOt Progressing        DISCHARGE PLANNING    • Discharge to home or other facility with appropriat

## 2019-01-13 NOTE — PROGRESS NOTES
[facility]    [place]    Unknown Kaimni Patient Status:  Inpatient    1940 MRN [id number]   Location [facility] 4W/SW/SE Attending Alena Garner MD   Hosp Day # 16 PCP Whit Marin MD       Impression/Kathy Wound infection     Episodic mood disorder (HCC)      Objective:   Temp: 97.6 °F (36.4 °C)  Pulse: 78  Resp: 20  BP: 141/62    Intake/Output:     Intake/Output Summary (Last 24 hours) at 1/13/2019 0934  Last data filed at 1/13/2019 0600  Gross per 24 hour 7. 6*  7.9*  8.3*  8.5   MG   --    --    --   1.9  1.8   GLU  107*  95  100*  101*  95       No results for input(s): ALT, AST, ALB, AMYLASE, LIPASE, LDH in the last 168 hours.     Invalid input(s): ALPHOS, TBIL, DBIL, TPROT    No results for input(s): TROP

## 2019-01-13 NOTE — PROGRESS NOTES
NEPHROLOGY DAILY PROGRESS NOTE     Follow up Reason: LIN     SUBJECTIVE:  Resting in bed   No acute events   Eating OK     OBJECTIVE:    Total Intake/Output:    Intake/Output Summary (Last 24 hours) at 1/12/2019 1950  Last data filed at 1/12/2019 Pargi 1 PRN   magnesium hydroxide (MILK OF MAGNESIA) 400 MG/5ML suspension 30 mL 30 mL Oral Daily PRN   bisacodyl (DULCOLAX) rectal suppository 10 mg 10 mg Rectal Daily PRN   ondansetron HCl (ZOFRAN) injection 4 mg 4 mg Intravenous Q6H PRN       Medications Prior 01/12/2019    HCT 23.6 (L) 01/12/2019     01/12/2019       IMAGING:         ASSESSMENT AND PLAN:   This is 69year old male with history of HTN, HLD, CKD stage 3 (prior to admission sCr had ranged from 1.5-1.8).  Hx R hip replacement with revision on

## 2019-01-13 NOTE — PROGRESS NOTES
[facility] Hospitalist Progress Note     CC: Hospital Follow up    PCP: Georgette Kelley MD       Assessment/Plan:     Active Problems:    Wound infection    Episodic mood disorder [facility])    Mr. Ana Rosa Solis is a 70 yo male [address] sig for bladder ca s/p chemo, BPH, with IR, rec draining underlying seroma to ensure proper wound healing, plan for this 1/14, NPO at midnight    Encephalopathy  - confused intermittently, some hallucinations  - no signs of acute infection  - only on tylenol and occasionally on tramadol for normal initially but increased prior to d/c  -continue BB   -holding arb for now    #Ischemic cardiomyopathy EF 55%  -monitor fluid status closely  -continue BB, resume arb when able  - ECHO 12/6/18  EF 50-55%, LVH mild, no sig valvular abnormalities, staton interactive, feeling better, but was confused earlier this morning, no fevers or chills, no chest pain or sob    OBJECTIVE:    Blood pressure 122/65, pulse 71, temperature 97.7 °F (36.5 °C), temperature source Oral, resp.  rate 20, height 5' 6\" (1.676 m), • aspirin EC  81 mg Oral Daily   • QUEtiapine Fumarate  100 mg Oral Nightly   • escitalopram  10 mg Oral Daily   • ertapenem  1 g Intravenous Q24H   • fluconazole  200 mg Oral Q24H   • MINERIN   Topical Daily   • nystatin   Topical BID   • allopurinol

## 2019-01-13 NOTE — PROGRESS NOTES
NEPHROLOGY DAILY PROGRESS NOTE     Follow up Reason: LIN     SUBJECTIVE:  Resting in bed   No acute events   Eating OK     OBJECTIVE:    Total Intake/Output:    Intake/Output Summary (Last 24 hours) at 1/13/2019 1051  Last data filed at 1/13/2019 0900  Radhames (MIRALAX) powder packet 17 g 17 g Oral Daily PRN   magnesium hydroxide (MILK OF MAGNESIA) 400 MG/5ML suspension 30 mL 30 mL Oral Daily PRN   bisacodyl (DULCOLAX) rectal suppository 10 mg 10 mg Rectal Daily PRN   ondansetron HCl (ZOFRAN) injection 4 mg 4 mg CREATSERUM 1.52 (H) 01/13/2019    WBC 5.7 01/13/2019    HCT 25.0 (L) 01/13/2019     01/13/2019       IMAGING:         ASSESSMENT AND PLAN:   This is 69year old male with history of HTN, HLD, CKD stage 3 (prior to admission sCr had ranged from 1.5-1

## 2019-01-14 ENCOUNTER — APPOINTMENT (OUTPATIENT)
Dept: INTERVENTIONAL RADIOLOGY/VASCULAR | Facility: HOSPITAL | Age: 79
DRG: 466 | End: 2019-01-14
Attending: ORTHOPAEDIC SURGERY
Payer: MEDICARE

## 2019-01-14 PROCEDURE — 0S9930Z DRAINAGE OF RIGHT HIP JOINT WITH DRAINAGE DEVICE, PERCUTANEOUS APPROACH: ICD-10-PCS | Performed by: RADIOLOGY

## 2019-01-14 RX ORDER — HALOPERIDOL 5 MG/ML
2 INJECTION INTRAMUSCULAR ONCE
Status: COMPLETED | OUTPATIENT
Start: 2019-01-14 | End: 2019-01-14

## 2019-01-14 RX ORDER — SODIUM CHLORIDE 9 MG/ML
INJECTION, SOLUTION INTRAVENOUS
Status: COMPLETED
Start: 2019-01-14 | End: 2019-01-14

## 2019-01-14 RX ORDER — LIDOCAINE HYDROCHLORIDE 20 MG/ML
INJECTION, SOLUTION EPIDURAL; INFILTRATION; INTRACAUDAL; PERINEURAL
Status: COMPLETED
Start: 2019-01-14 | End: 2019-01-14

## 2019-01-14 RX ORDER — MIDAZOLAM HYDROCHLORIDE 1 MG/ML
INJECTION INTRAMUSCULAR; INTRAVENOUS
Status: DISCONTINUED
Start: 2019-01-14 | End: 2019-01-14 | Stop reason: WASHOUT

## 2019-01-14 RX ORDER — MAGNESIUM OXIDE 400 MG (241.3 MG MAGNESIUM) TABLET
3 TABLET NIGHTLY
Status: DISCONTINUED | OUTPATIENT
Start: 2019-01-14 | End: 2019-01-23

## 2019-01-14 NOTE — PHYSICAL THERAPY NOTE
PHYSICAL THERAPY HIP TREATMENT NOTE - INPATIENT    Room Number: 442/442-A            Presenting Problem: (L hip revision)    Problem List  Active Problems:    Wound infection    Episodic mood disorder (HCC)      PHYSICAL THERAPY ASSESSMENT   Per  DR Nino Patel mechanics; Relaxation;Repositioning    BALANCE  Static Sitting: Fair +  Dynamic Sitting: Fair  Static Standing: Dependent  Dynamic Standing: Not tested    AM-PAC '6-Clicks' INPATIENT SHORT FORM - BASIC MOBILITY  How much difficulty does the patient currentl and maintain TTWB RLE   Goal #3   Current Status NT   Goal #4 Patient will tolerate standing x 3 min with BUE support on RW and maintain TTWB RLE   Goal #4   Current Status Unable   Goal #5 Patient verbalizes and/or demonstrates all precautions and safety

## 2019-01-14 NOTE — PROGRESS NOTES
Attending addendum:  I have seen and examined patient, discussed with NP. Agree with assessment and plan as outlined below.     [facility]    Cardiology Progress Note    Ty Counts Patient Status:  Inpatient    1940 MRN [id number] Delirium due to another medical condition     Major depressive disorder, recurrent episode, moderate (HCC)     Infected prosthesis of right hip (HCC)     Wound infection     Episodic mood disorder (HCC)      Objective:   Temp: 97.8 °F (36.6 °C)  Pulse: 8 139  138  143  141  141   K  3.9  4.3  4.3  4.3  4.3   CL  107  105  106  107  106   CO2  25  24  25  25  24   BUN  32*  28*  26*  23*  26*   CREATSERUM  1.78*  1.71*  1.41  1.52*  1.64*   CA  7.6*  7.9*  8.3*  8.5  8.6   MG   --    --   1.9  1.8  1.9   GL suppository 10 mg 10 mg Rectal Daily PRN   ondansetron HCl (ZOFRAN) injection 4 mg 4 mg Intravenous Q6H PRN

## 2019-01-14 NOTE — PROGRESS NOTES
[facility] dosing service    Initial Pharmacokinetic Consult for Vancomycin Dosing     Janett Contreras is a 69year old male admitted on 12/28 who is being treated for cellulitis. Pharmacy has been asked to dose Vancomycin by Dr. Jae Mayes.     He has No Kno CULTURE     Status: None (Preliminary result)    Collection Time: 01/14/19 10:12 AM   Result Value Ref Range    Aerobic Smear 1+ WBCs seen N/A    Aerobic Smear No organisms seen N/A   2. URINE CULTURE, ROUTINE     Status: None    Collection Time: 01/11/19

## 2019-01-14 NOTE — PROGRESS NOTES
[facility] AND [facility] Infectious Disease  Progress Note    Dara Hamilton Patient Status:  Inpatient    1940 MRN [id number]   Location [facility] 4W/SW/SE Attending Mally Elizondo MD   Hosp Day # 16 PCP Anson Morales MD     Subjective:  Rashel Cruz 12/06  - Secondary to erythema/drainage s/p OR washout 12/18  - OR cultures with enterobacter, 6 weeks of IV planned initially   - Readmitted with worsening erythema, drainage a couple weeks into his therapy  - s/p explantation of hardware and placement of

## 2019-01-14 NOTE — PROGRESS NOTES
NEPHROLOGY DAILY PROGRESS NOTE     Follow up Reason: LIN     SUBJECTIVE:  R hip aspirated this AM, drain in place   Combative today - requiring restraints     OBJECTIVE:    Total Intake/Output:    Intake/Output Summary (Last 24 hours) at 1/14/2019 1213  La mg 20 mg Oral Nightly   Vancomycin HCl (FIRVANQ) 50 MG/ML oral solution 125 mg 125 mg Oral Daily   Senna (SENOKOT) tab TABS 17.2 mg 17.2 mg Oral Nightly   Normal Saline Flush 0.9 % injection 3 mL 3 mL Intravenous PRN   acetaminophen (TYLENOL) tab 650 mg 65 tablet (300 mg total) by mouth daily. LABS:  Patient Labs Reviewed in Detail.  Pertinent Labs as follows:  Lab Results   Component Value Date     01/14/2019    K 4.3 01/14/2019     01/14/2019    CO2 24 01/14/2019    CA 8.6 01/14/2019    BU

## 2019-01-14 NOTE — PLAN OF CARE
ANXIETY    • Will report anxiety at manageable levels Not Progressing        CONFUSION    • Confusion, delirium, dementia or psychosis is improved or at baseline Not Progressing        DISCHARGE PLANNING    • Discharge to home or other facility with approp

## 2019-01-14 NOTE — WOUND PROGRESS NOTE
Wound Care Follow up:     Patient seen at bedside with PCT and Dhara Aivna RN present. Patient was repositioned and vac dressing was changed. Mepitel one was applied to staples for protection. RN updated and instructed to call with any questions or concerns.

## 2019-01-14 NOTE — PAYOR COMM NOTE
[id number] - APPROVED 19-19 PER IEXCHANGE-  FAXED CLINICALS 19- 19 ON 19, CLINICAL UPDATE 1/3/19- 19 FAXED ON 19 - FAXED UPDATE FOR 19- 18 ON 19, FAXED UPDATE 19-1/10/19 ON 1/10/19, FAXING UPDATE 19- is completed to avoid risk of bleeding complications.  Lovenox held 1/8 due to Hg drop  - PT/OT/SW     Acute blood loss anemia  -multifactorial with CKD, infection, recent surgery with acute blood loss  - intra op blood loss reportedly of 1.3- 2L  -prior hg diastolic dys, mild MR, TR     #SAM on CPAP  -cpap protocol  -encourage use with all sleep     #PAD (peripheral artery disease) ([facility] [address]  -will resume aspirin once h/h stable but would recommend to hold plavix  surgery     #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 cataplex Grossly normal, CN intact, sensory intact  Psych: Affect- normal  SKIN: dry scaly, flaking skin over entire body  EXT: no edema     Labs:              Recent Labs   Lab  01/08/19   0615    01/09/19   0550  01/09/19   1634  01/10/19   0508   RBC  2.72*   -- imaging and the cephalad extent of this collection cannot be determined. This likely represents a postprocedural fluid collection such as a seroma however a hematoma or infectious collection could give this appearance.   Due to beam hardening artifact caus exchange and antibiotic beads on 12/18, DC'ed on IV invanz  - ID followed last admit, PICC line placed 12/18, plan for 6 weeks IV abx with invanz and plan on po suppression thereafter, prior OR cx with enterobacter aerogenes, also on IV micafungin for cand stable  - renal consulted, holding home lasix     #Coronary artery disease  -holding asa and palvix given surgery, discussed with Dr. Andre Yen who is ok with asa 81mg when h/h stable, but recommended to hold plavix for 28 days (while on lovneox) with bleeding TR  - this would be patient 3rd surgery this month  - continuing on BB, statin  - patient needs urgent surgery   - patient is HIGH RISK for surgery, would not recommend further testing prior to surgery, optimized with BB and volume status  - cards will fol 24          Meds:      • QUEtiapine Fumarate  150 mg Oral Nightly   • escitalopram  10 mg Oral Daily   • ertapenem  1 g Intravenous Q24H   • fluconazole  200 mg Oral Q24H   • MINERIN   Topical Daily   • nystatin   Topical BID   • allopurinol  300 mg Oral placement of antibiotic spacer with Dr. Caitlin Mehta  - Toe-touch weightbearing right lower extremity, PT/OT for transfers only no active ambulation, Hip precautions  - per ortho Lovenox for DVT prophylaxis for 28 days– would like to hold Plavix, but is ok with a with diffuse LAD disease and Lcx with PCI and acute closure, stress test in 12/2017 was reported as normal  -patient with pre op eval by primary cardiologist on Dr. Samira Elizondo on 11/23/18 at [facility] cardiology [place], and was cleared for surgery   - EC for bleeding)  Atrophy: IS  Lines: PIV        Subjective:      AO*3 this morning, more interactive, feeling better, but was confused earlier this morning, no fevers or chills, no chest pain or sob     OBJECTIVE:     Blood pressure 122/65, pulse 71, tempera 20 mg Oral QAM AC   • Pravastatin Sodium  20 mg Oral Nightly   • Vancomycin HCl  125 mg Oral Daily   • Senna  17.2 mg Oral Nightly

## 2019-01-14 NOTE — PROGRESS NOTES
Heide Shukla  [id number]  1/14/2019    Post procedure/ recovery hand-off report given to Doug Jonas RN. Patient's vital signs are stable. Procedural access site is dry and intact with no signs and symptoms of bleeding/ hematoma. TOOTIE drain in place.     Alcides Luong

## 2019-01-14 NOTE — PROCEDURES
[facility] HOSP - [facility]  Procedure Note    Millie Julien Patient Status:  Inpatient    1940 MRN [id number]   Location [facility] Attending Connor Jones MD   Hosp Day # 16 PCP Estrella Mera MD     Procedure: Kayla Andres

## 2019-01-14 NOTE — PROGRESS NOTES
[facility] Hospitalist Progress Note     CC: Hospital Follow up    PCP: Cassie Mcbride MD       Assessment/Plan:     Active Problems:    Wound infection    Episodic mood disorder [facility])    Mr. Rahul Frank is a 68 yo male [address] sig for bladder ca s/p chemo, BPH, discussed with Dr. Steven Landaverde, whom discussed with IR, rec draining underlying seroma to ensure proper wound healing, plan for this 1/14  - s/p IR placement of drain on 1/14 continue to monitor output    Encephalopathy  - confused intermittently, some hallucina [facility]ology [facility], and was cleared for surgery   - ECHO 12/6/18  EF 50-55%, LVH mild, no sig valvular abnormalities, diastolic dys, mild MR, TR  - tele, cards following  - asa 81mg resumed 1/13     #Essential hypertension- bp low normal initially but in PIV     Dispo: rehab pending stable Hg and MS     Discussed with son over phone 1/13    Questions/concerns were discussed with patient and/or family by bedside.     Crescencio Miguel MD  [facility] Hospitalist        Subjective:     Agitated this morning, confused hallucin 106   CO2  25  25  24       No results for input(s): ALT, AST, ALB, AMYLASE, LIPASE, LDH in the last 168 hours.     Invalid input(s): ALPHOS, TBIL, DBIL, TPROT      Imaging:          Meds:     • melatonin  3 mg Oral Nightly   • vancomycin  20 mg/kg (Adjust

## 2019-01-15 RX ORDER — QUETIAPINE 25 MG/1
25 TABLET, FILM COATED ORAL NIGHTLY PRN
Status: DISCONTINUED | OUTPATIENT
Start: 2019-01-15 | End: 2019-01-23

## 2019-01-15 RX ORDER — SODIUM CHLORIDE 9 MG/ML
INJECTION, SOLUTION INTRAVENOUS CONTINUOUS
Status: DISCONTINUED | OUTPATIENT
Start: 2019-01-15 | End: 2019-01-17

## 2019-01-15 NOTE — PLAN OF CARE
Impaired Functional Mobility    • Achieve highest/safest level of mobility/gait Not Progressing        NEUROLOGICAL - ADULT    • Achieves stable or improved neurological status Not Progressing          ANXIETY    • Will report anxiety at manageable levels

## 2019-01-15 NOTE — PROGRESS NOTES
[facility] AND [facility] Infectious Disease  Progress Note    Heide Shukla Patient Status:  Inpatient    1940 MRN [id number]   Location [facility] 4W/SW/SE Attending Cris Jiménez MD   Hosp Day # 25 PCP Tamera King MD     Subjective:  Feli Kelsey spacer - per family no certain plans for a 2nd stage, OR cultures with candida parapsilosis  - On IV meropenem (off invanz, adjusted to lower dose given AMS), IV vancomycin, and p.o.  Fluconazole  - s/p IR drain placement 1/14 for bleeding complications

## 2019-01-15 NOTE — PROGRESS NOTES
NEPHROLOGY DAILY PROGRESS NOTE     Follow up Reason: LIN     SUBJECTIVE:  AMS - no better  Still in restraints   Cr is up     OBJECTIVE:    Total Intake/Output:    Intake/Output Summary (Last 24 hours) at 1/15/2019 1204  Last data filed at 1/15/2019 1059 [facility]) 50 MG/ML oral solution 125 mg 125 mg Oral Daily   Senna (SENOKOT) tab TABS 17.2 mg 17.2 mg Oral Nightly   Normal Saline Flush 0.9 % injection 3 mL 3 mL Intravenous PRN   acetaminophen (TYLENOL) tab 650 mg 650 mg Oral Q6H PRN   PEG 3350 (MIRALAX) LABS:  Patient Labs Reviewed in Detail.  Pertinent Labs as follows:  Lab Results   Component Value Date     01/15/2019    K 4.2 01/15/2019     01/15/2019    CO2 22 01/15/2019    CA 8.4 (L) 01/15/2019    BUN 30 (H) 01/15/2019    CREATSERUM

## 2019-01-15 NOTE — WOUND PROGRESS NOTE
Wound Care Follow up: Wound Vac in place with good seal.  Will continue to follow and next vac change will be on Wednesday 1/16. Call with any questions or concerns.

## 2019-01-15 NOTE — OCCUPATIONAL THERAPY NOTE
OCCUPATIONAL THERAPY RE-EVALUATION NOTE - INPATIENT    Room Number: 442/442-A         Presenting Problem: (explant and spacer placement)     Problem List  Active Problems:    Wound infection    Episodic mood disorder (HCC)      OCCUPATIONAL THERAPY ASSESSM RECOMMENDATIONS  OT Discharge Recommendations: Sub-acute rehabilitation  OT Device Recommendations: TBD    PLAN  OT Treatment Plan: Balance activities; Energy conservation/work simplification techniques;ADL training;Functional transfer training;Patient/Fami perform bed mobiltiy with mod A for particiaption in supine self care 1x  Comment:     Patient will independently recall posterior hip precautions 1x  Comment:                   Goals  on:19  Frequency:3-5x/week                  Patient self-sta

## 2019-01-15 NOTE — CM/SW NOTE
1138am- CAN received auth from [employer] for the pt. To return to [facility]. Olivia Epifanio [id number] good from 1/15-19-1/17/19.   CAN notified Dr. Jose Cheung of the [facility].   ---------------------------------------------------------------  0940- Updated i

## 2019-01-15 NOTE — PHYSICAL THERAPY NOTE
PHYSICAL THERAPY HIP TREATMENT NOTE - INPATIENT    Room Number: 442/442-A            Presenting Problem: (L hip revision)    Problem List  Active Problems:    Wound infection    Episodic mood disorder (HCC)      PHYSICAL THERAPY ASSESSMENT   Pt  Educated o Total   -   Need to walk in hospital room?: Total   -   Climbing 3-5 steps with a railing?: Total     AM-PAC Score:  Raw Score: 8   Approx Degree of Impairment: 86.62%   Standardized Score (AM-PAC Scale): 28.58   CMS Modifier (G-Code): CM    FUNCTIONAL GODWIN

## 2019-01-15 NOTE — PLAN OF CARE
ANXIETY    • Will report anxiety at manageable levels Not Progressing        CONFUSION    • Confusion, delirium, dementia or psychosis is improved or at baseline Not Progressing        Impaired Cognition    • Patient will exhibit improved attention, though

## 2019-01-15 NOTE — PROGRESS NOTES
[facility] Hospitalist Progress Note     CC: Hospital Follow up    PCP: Tamera King MD       Assessment/Plan:     Active Problems:    Wound infection    Episodic mood disorder [facility])    Mr. Dianelys Edge is a 68 yo male [address] [employer] sig for bladder ca s/p chemo, BPH, discussed with Dr. Gay Rodriguez, whom discussed with IR, rec draining underlying seroma to ensure proper wound healing, plan for this 1/14  - s/p IR placement of drain on 1/14 continue to monitor output    Encephalopathy  - confused intermittently, some hallucina cardiologist on Dr. Verena Spence on 11/23/18 at [facility] cardiology [place], and was cleared for surgery   - ECHO 12/6/18  EF 50-55%, LVH mild, no sig valvular abnormalities, diastolic dys, mild MR, TR  - tele, cards following  - asa 81mg resumed 1/13    lovneox (held for bleeding)  Atrophy: IS  Lines: PIV     Dispo: rehab pending stable Hg and MS     Discussed with son over phone 1/14    Questions/concerns were discussed with patient and/or family by bedside.     Syed Segal MD  [facility] Hospitalist        Subject NA  141  141  142   K  4.3  4.3  4.2   CL  107  106  108   CO2  25  24  22       No results for input(s): ALT, AST, ALB, AMYLASE, LIPASE, LDH in the last 168 hours.     Invalid input(s): ALPHOS, TBIL, DBIL, TPROT      Imaging:  Ir Miscellaneous    Result

## 2019-01-15 NOTE — PROGRESS NOTES
[facility]    Cardiology Progress Note    Lilian Comment Patient Status:  Inpatient    1940 MRN [id number]   Location [facility] 4W/SW/SE Attending Radha Hart MD   Hosp Day # 18 PCP Cassie Mcbride MD       Impression/Kathy °C)  Pulse: 84  Resp: 20  BP: 124/43    Intake/Output:     Intake/Output Summary (Last 24 hours) at 1/15/2019 1236  Last data filed at 1/15/2019 1052  Gross per 24 hour   Intake 100 ml   Output 155 ml   Net -55 ml       Last 3 Weights  12/28/18 1956 : 234 107*       No results for input(s): ALT, AST, ALB, AMYLASE, LIPASE, LDH in the last 168 hours. Invalid input(s): ALPHOS, TBIL, DBIL, TPROT    No results for input(s): TROP in the last 168 hours.     Allergies:   No Known Allergies    Medications:    Curr (DULCOLAX) rectal suppository 10 mg 10 mg Rectal Daily PRN   ondansetron HCl (ZOFRAN) injection 4 mg 4 mg Intravenous Q6H PRN       Glen Gandhi MD  1/15/2019  12:36 PM

## 2019-01-15 NOTE — PAYOR COMM NOTE
REF: [id number] APPROVED 12/28/18-1/2/19, 1/2/19-1/13/19 PENDING PER NERY, CLINICALS FAXED THROUGH 1/13/19-  FAXING CLINICAL UPDATE 1/13/19-1/15/19 REQUESTING ADDITIONAL DAYS        1/13/19  Assessment/Plan:      Active Problems:    Wound infection    1/13 per DR. Kaycee Purdy  - discussed with Dr. Josh Lopez, whom discussed with IR, rec draining underlying seroma to ensure proper wound healing, plan for this 1/14, NPO at midnight     Encephalopathy  - confused intermittently, some hallucinations  - no signs of acu 81mg resumed 1/13     #Essential hypertension- bp low normal initially but increased prior to d/c  -continue BB   -holding arb for now     #Ischemic cardiomyopathy EF 55%  -monitor fluid status closely  -continue BB, resume arb when able  - ECHO 12/6/18  E SpO2 100 %.      Temp:  [97.6 °F (36.4 °C)-99.1 °F (37.3 °C)] 97.7 °F (36.5 °C)  Pulse:  [71-98] 71  Resp:  [20] 20  BP: (122-142)/(40-65) 122/65      Exam    GEN: NAD,  ,    HEENT: NC AT  Neck: Supple, no JVD  Pulm: CTAB, no crackles or wheezes  CV: RRR, n HLD, SAM on cpap, PVD, chronic low back pain, chronic opiate use, obesity bmi 36, ARRIOLA, OA with prior R hip replacement with revision on 12/6, then reinfection with readmit to el s/p I&D with poly and head exchange with ab's beads placed on 12/18 DC'ed to infection  - only on tylenol and occasionally on tramadol for pain  - Psyc following  - likely multifactorial delirium   - pulling at lines, place right wrist restraint 1/14     Left upper ext cellulitis   - erythema, no swelling  - Vanco started  - ID to BB   -holding arb for now     #Ischemic cardiomyopathy EF 55%  -monitor fluid status closely  -continue BB, resume arb when able  - ECHO 12/6/18  EF 50-55%, LVH mild, no sig valvular abnormalities, diastolic dys, mild MR, TR     #SAM on CPAP  -cpap protoco HEENT: NC AT  Neck: Supple, no JVD  Pulm: CTAB, no crackles or wheezes  CV: RRR, no murmurs  ABD: Soft, non-tender, non-distended, +BS  MSK: moves all extremities, dressing and wound vac in place on right hip, right upper ext  Neuro: Grossly normal, CN i is up      OBJECTIVE:     Total Intake/Output:     Intake/Output Summary (Last 24 hours) at 1/15/2019 1204  Last data filed at 1/15/2019 1052      Gross per 24 hour   Intake 100 ml   Output 155 ml   Net -55 ml         PHYSICAL EXAM:  /43 (BP Location Nightly   Normal Saline Flush 0.9 % injection 3 mL 3 mL Intravenous PRN   acetaminophen (TYLENOL) tab 650 mg 650 mg Oral Q6H PRN   PEG 3350 (MIRALAX) powder packet 17 g 17 g Oral Daily PRN   magnesium hydroxide (MILK OF MAGNESIA) 400 MG/5ML suspension 30 m Value Date      01/15/2019     K 4.2 01/15/2019      01/15/2019     CO2 22 01/15/2019     CA 8.4 (L) 01/15/2019     BUN 30 (H) 01/15/2019     CREATSERUM 1.83 (H) 01/15/2019     WBC 6.5 01/15/2019     HCT 25.0 (L) 01/15/2019      01/15/20    24 hour drain output: 135cc        Results:   Labs:        Lab Results   Component Value Date     WBC 6.5 01/15/2019     RBC 2.85 01/15/2019     HGB 8.2 01/15/2019     HCT 25.0 01/15/2019     MCV 87.7 01/15/2019     MCH 28.6 01/15/2019     MCHC 32.6 0

## 2019-01-15 NOTE — PROGRESS NOTES
[facility]  Progress Note    Dar Robbins Patient Status:  Inpatient    1940 MRN [id number]   Location [facility] 4W/SW/SE Attending Elysia Jarvis MD   Hosp Day # 25 PCP Chery Grace MD       Subjective:       Objective

## 2019-01-16 RX ORDER — SODIUM CHLORIDE 9 MG/ML
INJECTION, SOLUTION INTRAVENOUS
Status: COMPLETED
Start: 2019-01-16 | End: 2019-01-16

## 2019-01-16 RX ORDER — ENOXAPARIN SODIUM 100 MG/ML
40 INJECTION SUBCUTANEOUS DAILY
Status: DISCONTINUED | OUTPATIENT
Start: 2019-01-16 | End: 2019-01-23

## 2019-01-16 RX ORDER — 0.9 % SODIUM CHLORIDE 0.9 %
VIAL (ML) INJECTION
Status: COMPLETED
Start: 2019-01-16 | End: 2019-01-16

## 2019-01-16 NOTE — PHYSICAL THERAPY NOTE
PHYSICAL THERAPY TREATMENT NOTE - INPATIENT     Room Number: 442/442-A       Presenting Problem: (L hip revision)    Problem List  Active Problems:    Wound infection    Episodic mood disorder (HCC)      PHYSICAL THERAPY ASSESSMENT     Pt seen daily.  Pt ed Moving from lying on back to sitting on the side of the bed?: A Lot   How much help from another person does the patient currently need. ..   -   Moving to and from a bed to a chair (including a wheelchair)?: Total   -   Need to walk in hospital room?: Tota

## 2019-01-16 NOTE — PROGRESS NOTES
[facility]    Cardiology Progress Note    Ty Counts Patient Status:  Inpatient    1940 MRN [id number]   Location [facility] 4W/SW/SE Attending Aravind Leal MD   Hosp Day # 23 PCP Licha Kelly MD       Impression/Kathy infection     Episodic mood disorder (HCC)      Objective:   Temp: 97.8 °F (36.6 °C)  Pulse: 91  Resp: 18  BP: 131/61    Intake/Output:     Intake/Output Summary (Last 24 hours) at 1/16/2019 1152  Last data filed at 1/16/2019 1100  Gross per 24 hour   [place] ALPHOS, TBIL, DBIL, TPROT    No results for input(s): TROP in the last 168 hours.     Allergies:   No Known Allergies    Medications:    Current Facility-Administered Medications:  0.9%  NaCl infusion  Intravenous Continuous   Meropenem (MERREM) 500 mg in s Trevon Jeffrey MD  1/16/2019  11:52 AM

## 2019-01-16 NOTE — PROGRESS NOTES
[facility] AND [facility] Infectious Disease  Progress Note    Dara Hamilton Patient Status:  Inpatient    1940 MRN [id number]   Location [facility] 4W/SW/SE Attending Mally Elizondo MD   Hosp Day # 23 PCP Anson Morales MD     Subjective:  [employer] spacer - per family no certain plans for a 2nd stage, OR cultures with candida parapsilosis  - On IV meropenem (off invanz, adjusted to lower dose given AMS), IV vancomycin, and p.o.  Fluconazole  - s/p IR drain placement 1/14 for bleeding complications

## 2019-01-16 NOTE — PROGRESS NOTES
[facility] Hospitalist Progress Note     CC: Hospital Follow up    PCP: Jocelyn Holloway MD       Assessment/Plan:     Active Problems:    Wound infection    Episodic mood disorder [facility])    Mr. Madelaine Loving is a 68 yo male [employer] sig for bladder ca s/p chemo, BPH, ok to resume asa 1/13 per DR. Gomez, ok to resume lovneox 1/16  - discussed with Dr. Deborah Huitron, whom discussed with IR, rec draining underlying seroma to ensure proper wound healing, plan for this 1/14  - s/p IR placement of drain on 1/14 continue to monitor o acute closure, stress test in 12/2017 was reported as normal  -patient with pre op eval by primary cardiologist on Dr. Demetrio Hendricks on 11/23/18 at [facility] cardiology [place], and was cleared for surgery   - ECHO 12/6/18  EF 50-55%, LVH mild, no sig valvul status  - cards will follow loyda operatively     FN:  - IVF: none  - Diet: reg     DVT Prophy: lovneox resume 1/16  Atrophy: IS  Lines: picc pulled on 1/16, PIV placed, replace PICC when Mental status improved     Dispo: rehab pending stable Hg and MS 01/15/19   0650  01/16/19   0614   GLU  94  107*  103*   BUN  26*  30*  30*   CREATSERUM  1.64*  1.83*  1.77*   GFRAA  46*  40*  42*   GFRNAA  40*  35*  36*   CA  8.6  8.4*  8.3*   NA  141  142  144   K  4.3  4.2  4.3   CL  106  108  112*   CO2  24  22  21

## 2019-01-16 NOTE — PLAN OF CARE
Impaired Cognition    • Patient will exhibit improved attention, thought processing and/or memory Not Progressing        NEUROLOGICAL - ADULT    • Achieves stable or improved neurological status Not Progressing          ANXIETY    • Will report anxiety at

## 2019-01-16 NOTE — PLAN OF CARE
ANXIETY    • Will report anxiety at manageable levels Not Progressing        Impaired Cognition    • Patient will exhibit improved attention, thought processing and/or memory Not Progressing        Impaired Functional Mobility    • Achieve highest/safest l

## 2019-01-16 NOTE — PROGRESS NOTES
NEPHROLOGY DAILY PROGRESS NOTE     Follow up Reason: LIN     SUBJECTIVE:  Remains altered but less combative   Pulled his PICC line this am     OBJECTIVE:    Total Intake/Output:    Intake/Output Summary (Last 24 hours) at 1/16/2019 1235  Last data filed a 50 MG/ML oral solution 125 mg 125 mg Oral Daily   Senna (SENOKOT) tab TABS 17.2 mg 17.2 mg Oral Nightly   Normal Saline Flush 0.9 % injection 3 mL 3 mL Intravenous PRN   acetaminophen (TYLENOL) tab 650 mg 650 mg Oral Q6H PRN   PEG 3350 (MIRALAX) powder pac LABS:  Patient Labs Reviewed in Detail.  Pertinent Labs as follows:  Lab Results   Component Value Date     01/16/2019    K 4.3 01/16/2019     (H) 01/16/2019    CO2 21 (L) 01/16/2019    CA 8.3 (L) 01/16/2019    BUN 30 (H) 01/16/2019    CRE

## 2019-01-16 NOTE — OCCUPATIONAL THERAPY NOTE
OCCUPATIONAL THERAPY TREATMENT NOTE - INPATIENT    Room Number: 442/442-A         Presenting Problem: (explant and spacer placement)     Problem List  Active Problems:    Wound infection    Episodic mood disorder ([facility] [address]      OCCUPATIONAL THERAPY ASSESSMENT currently need…  -   Putting on and taking off regular lower body clothing?: Total  -   Bathing (including washing, rinsing, drying)?: A Lot  -   Toileting, which includes using toilet, bedpan or urinal? : A Lot  -   Putting on and taking off regular upper

## 2019-01-16 NOTE — PROGRESS NOTES
[facility]  Progress Note    Tressa Wall Patient Status:  Inpatient    1940 MRN [id number]   Location [facility] 4W/SW/SE Attending Rita Gómez MD   Hosp Day # 23 PCP Georgette Kelley MD       Subjective:       Objective

## 2019-01-16 NOTE — WOUND PROGRESS NOTE
Pt seen for right hip incisional vac dressing change. Bedside rn present to assist with positioning. The pt had  pulled on the right hip paulo drain and dressing was loosened.  Dressing removed,  Area was cleansed with saline and a new biopatch and drain dress

## 2019-01-17 RX ORDER — DEXTROSE AND SODIUM CHLORIDE 5; .45 G/100ML; G/100ML
INJECTION, SOLUTION INTRAVENOUS CONTINUOUS
Status: DISCONTINUED | OUTPATIENT
Start: 2019-01-17 | End: 2019-01-19

## 2019-01-17 NOTE — PROGRESS NOTES
[facility] AND [facility] Infectious Disease  Progress Note    Carmela Newman Patient Status:  Inpatient    1940 MRN [id number]   Location [facility] 4W/SW/SE Attending Yesica Walker MD   Hosp Day # 20 PCP MD Deana Garcia spacer - per family no certain plans for a 2nd stage, OR cultures with candida parapsilosis  - On IV meropenem (off invanz, adjusted to lower dose given AMS), IV vancomycin, and p.o.  Fluconazole  - s/p IR drain placement 1/14 for bleeding complications

## 2019-01-17 NOTE — PROGRESS NOTES
[facility] HOSP - [facility]    Cardiology Progress Note    Latrice Llamas Patient Status:  Inpatient    1940 MRN [id number]   Location [facility] 4W/SW/SE Attending Janeth Dubose MD   Hosp Day # 20 PCP Yesica Macias MD       Impression/Kathy 144/97    Intake/Output:     Intake/Output Summary (Last 24 hours) at 1/17/2019 0848  Last data filed at 1/17/2019 0600  Gross per 24 hour   Intake 1016 ml   Output 46 ml   Net 970 ml       Last 3 Weights  12/28/18 1956 : 234 lb 3.2 oz (106.2 kg)  12/17/18 approximately 6 x 3 x 20 cm (AP x transverse x CC) however it is not completely included in   the field of imaging and the cephalad extent of this collection cannot be determined.   This likely represents a postprocedural fluid collection such as a seroma h Nightly   Normal Saline Flush 0.9 % injection 10 mL 10 mL Intravenous PRN   escitalopram (LEXAPRO) tablet 10 mg 10 mg Oral Daily   Normal Saline Flush 0.9 % injection 10 mL 10 mL Intravenous PRN   fluconazole (DIFLUCAN) tab 200 mg 200 mg Oral Q24H   mineri

## 2019-01-17 NOTE — PROGRESS NOTES
NEPHROLOGY DAILY PROGRESS NOTE     Follow up Reason: LIN     SUBJECTIVE:  Remains altered but less combative   Na trending up   Cr better    OBJECTIVE:    Total Intake/Output:    Intake/Output Summary (Last 24 hours) at 1/17/2019 1230  Last data filed at 1 Pravastatin Sodium (PRAVACHOL) tab 20 mg 20 mg Oral Nightly   Vancomycin HCl (FIRVANQ) 50 MG/ML oral solution 125 mg 125 mg Oral Daily   Senna (SENOKOT) tab TABS 17.2 mg 17.2 mg Oral Nightly   Normal Saline Flush 0.9 % injection 3 mL 3 mL Intravenous PRN needed. allopurinol 300 MG Oral Tab Take 1 tablet (300 mg total) by mouth daily. LABS:  Patient Labs Reviewed in Detail.  Pertinent Labs as follows:  Lab Results   Component Value Date     (H) 01/17/2019    K 4.3 01/17/2019     (H) 01/17

## 2019-01-17 NOTE — PLAN OF CARE
ANXIETY    • Will report anxiety at manageable levels Not Progressing        CONFUSION    • Confusion, delirium, dementia or psychosis is improved or at baseline Not Progressing        Delirium    • Minimize duration of delirium Not Progressing        DISC running. Tolerating PO fluids. Poor appetite. Plan to replace picc line when mental status improves. Taking PO meds one at a time with sips of water. Needs assistance with feeding. Patient frequently redirected and reoriented. Hallucinating.  Having tremors

## 2019-01-17 NOTE — PAYOR COMM NOTE
REF: [id number] APPROVED 12/28/18-1/2/19, 1/2/19-1/13/19 PENDING PER IEX[employer]ARRERA, CLINICALS FAXED THROUGH 1/13/19- FAXED CLINICAL UPDATE 1/13/19-1/15/19 ON 1/15/19 , FAXING UPDATE 1/16/19- 1/17/19 REQUESTING ADDITIONAL DAYS      1/16/18  Assessment/Plan:     until this is completed to avoid risk of bleeding complications. Lovenox/asa held 1/8 due to Hg drop, ok to resume asa 1/13 per DR. Gomez, ok to resume lovneox 1/16  - discussed with Dr. Shalini Atkins, whom discussed with IR, rec draining underlying seroma to ensu all AC    -BB continued   -statin continued  - severe CAD with diffuse LAD disease and Lcx with PCI and acute closure, stress test in 12/2017 was reported as normal  -patient with pre op eval by primary cardiologist on Dr. Aydee Beasley on 11/23/18 at [address] [place] [address] operatively     FN:  - IVF: none  - Diet: reg     DVT Prophy: lovenox resume 1/16  Atrophy: IS  Lines: picc pulled on 1/16, PIV placed, replace PICC when Mental status improved      Subjective:      Confused, pulling at lines today, able to follow commands MINERIN   Topical Daily   • nystatin   Topical BID   • allopurinol  300 mg Oral Daily   • docusate sodium  100 mg Oral BID   • ferrous sulfate  325 mg Oral BID with meals   • Metoprolol Succinate ER  25 mg Oral Daily   • Pantoprazole Sodium  20 mg Oral QAM Normal Saline Flush 0.9 % injection 10 mL 10 mL Intravenous PRN   fluconazole (DIFLUCAN) tab 200 mg 200 mg Oral Q24H   minerin (EUCERIN) cream   Topical Daily   TraMADol HCl (ULTRAM) tab 50 mg 50 mg Oral Q6H PRN   nystatin (MYCOSTATIN) 711928 UNIT/GM cre docusate sodium 100 MG Oral Cap Take 100 mg by mouth 2 (two) times daily. escitalopram 20 MG Oral Tab Take 1 tablet (20 mg total) by mouth daily. Metoprolol Succinate ER 25 MG Oral Tablet 24 Hr Take 1 tablet by mouth daily.    Pravastatin Sodium 20 MG

## 2019-01-17 NOTE — PROGRESS NOTES
[facility] Hospitalist Progress Note     CC: Hospital Follow up    PCP: Smita Still MD       Assessment/Plan:     Active Problems:    Wound infection    Episodic mood disorder [facility])    Mr. John Medina is a 70 yo male [employer] sig for bladder ca s/p chemo, BPH, place since surgery  - Toe-touch weightbearing right lower extremity, PT/OT for transfers only no active ambulation, Hip precautions  - per ortho Lovenox for DVT prophylaxis for 28 days– would like to hold Plavix, but is ok with aspirin until this is compl recommended to hold plavix for 28 days (while on lovneox) with bleeding concerns, holding all AC    -BB continued   -statin continued  - severe CAD with diffuse LAD disease and Lcx with PCI and acute closure, stress test in 12/2017 was reported as normal this month  - continuing on BB, statin  - patient needs urgent surgery   - patient is HIGH RISK for surgery, would not recommend further testing prior to surgery, optimized with BB and volume status  - cards will follow loyda operatively     FN:  - IVF: non Affect confused  SKIN: dry scaly, flaking skin over entire body  EXT: no edema      Data Review:       Labs:     Recent Labs   Lab  01/15/19   0650  01/16/19   0614  01/17/19   0919   RBC  2.85*  2.88*  2.91*   HGB  8.2*  8.3*  8.3*   HCT  25.0*  25.7*  26

## 2019-01-17 NOTE — PLAN OF CARE
ANXIETY    • Will report anxiety at manageable levels Progressing        CONFUSION    • Confusion, delirium, dementia or psychosis is improved or at baseline Progressing        Delirium    • Minimize duration of delirium Progressing        Diabetes/Glucose

## 2019-01-17 NOTE — WOUND PROGRESS NOTE
Pt seen for wound vac check. Vac is running at 125 mmHg and a patent seal is detected. There is sanguinous drainage in canister. Next dressing change is planned for tomorrow 1/18.

## 2019-01-18 RX ORDER — HALOPERIDOL 5 MG/ML
2 INJECTION INTRAMUSCULAR EVERY 6 HOURS PRN
Status: DISCONTINUED | OUTPATIENT
Start: 2019-01-18 | End: 2019-01-23

## 2019-01-18 NOTE — PROGRESS NOTES
[facility] Hospitalist Progress Note     CC: Hospital Follow up    PCP: Gianni Fuchs MD       Assessment/Plan:     Active Problems:    Wound infection    Episodic mood disorder [facility])    Mr. Forrest Sinegr is a 70 yo male [employer] sig for bladder ca s/p chemo, BPH, place since surgery  - Toe-touch weightbearing right lower extremity, PT/OT for transfers only no active ambulation, Hip precautions  - per ortho Lovenox for DVT prophylaxis for 28 days– would like to hold Plavix, but is ok with aspirin until this is compl recommended to hold plavix for 28 days (while on lovneox) with bleeding concerns, holding all AC    -BB continued   -statin continued  - severe CAD with diffuse LAD disease and Lcx with PCI and acute closure, stress test in 12/2017 was reported as normal this month  - continuing on BB, statin  - patient needs urgent surgery   - patient is HIGH RISK for surgery, would not recommend further testing prior to surgery, optimized with BB and volume status  - cards will follow loyda operatively     FN:  - IVF: non 2.88*   HGB  8.3*  8.3*  8.3*   HCT  25.7*  26.0*  25.8*   MCV  89.3  89.5  89.6   MCH  29.0  28.6  29.0   MCHC  32.5  32.0  32.3   RDW  17.0*  16.7*  17.0*   WBC  7.1  6.1  5.7   PLT  193  198  193         Recent Labs   Lab  01/16/19   0614  01/17/19   09

## 2019-01-18 NOTE — PROGRESS NOTES
[address] Patient Status:  Inpatient    1940 MRN [id number]   Location [facility] 2W/SW Attending Elysia Jarvis MD   Hosp Day # 21 PCP Chery Grace MD     Subjective:  Procedure(s):  EXPLANT RIGHT TOTAL HIP ARTH dextrose 5 %-0.45 % NaCl infusion  Intravenous Continuous   Vancomycin HCl (VANCOCIN) 1,250 mg in sodium chloride 0.9 % 500 mL IVPB 15 mg/kg (Adjusted) Intravenous Q24H   Enoxaparin Sodium (LOVENOX) 40 MG/0.4ML injection 40 mg 40 mg Subcutaneous Daily

## 2019-01-18 NOTE — OCCUPATIONAL THERAPY NOTE
OCCUPATIONAL THERAPY TREATMENT NOTE - INPATIENT    Room Number: 442/442-A         Presenting Problem: (explant and spacer placement)     Problem List  Active Problems:    Wound infection    Episodic mood disorder ([facility] [address]      OCCUPATIONAL THERAPY ASSESSMENT clothing?: Total  -   Bathing (including washing, rinsing, drying)?: A Lot  -   Toileting, which includes using toilet, bedpan or urinal? : Total  -   Putting on and taking off regular upper body clothing?: A Lot  -   Taking care of personal grooming such

## 2019-01-18 NOTE — PROGRESS NOTES
[facility] dosing service    Follow-up Pharmacokinetic Consult for Vancomycin Dosing     Larry No is a 69year old male who is being treated for cellulitis. Patient is on day 4 of Vancomycin 1250mg IV Q 24 H. Goal trough is 15-20 ug/mL.     He CULTURE     Status: Abnormal    Collection Time: 01/14/19 10:12 AM   Result Value Ref Range    Aerobic Culture Result 1+ growth Candida albicans (A) N/A    Aerobic Smear 1+ WBCs seen N/A    Aerobic Smear No organisms seen N/A   3. URINE CULTURE, ROUTINE

## 2019-01-18 NOTE — CM/SW NOTE
Plan is for the pt. To return to [facility] when he is medically stable. CAN spoke with Daniela and confirmed the pt's Verneda Sitter is good until Monday 1/21. If the pt.  Does not discharge over the weekend, new insurance authorization will be needed from E

## 2019-01-18 NOTE — PROGRESS NOTES
[facility] HOSP - [facility]    Cardiology Progress Note    Cynthia Garg Patient Status:  Inpatient    1940 MRN [id number]   Location [facility] 4W/SW/SE Attending Patricia Emmanuel MD   Hosp Day # 21 PCP Natalie Leong MD       Impression/Kathy ([address]      Objective:   Temp: 97 °F (36.1 °C)  Pulse: 97  Resp: 18  BP: 133/106    Intake/Output:     Intake/Output Summary (Last 24 hours) at 1/18/2019 1050  Last data filed at 1/18/2019 0510  Gross per 24 hour   Intake 2641 ml   Output 315 ml   Net 2326 the right hip.   Within the subcutaneous fat beneath the skin staples there is a fluid collection measuring approximately 6 x 3 x 20 cm (AP x transverse x CC) however it is not completely included in   the field of imaging and the cephalad extent of this co 500 mg in sodium chloride 0.9% 100 mL  mg Intravenous Q24H   QUEtiapine Fumarate (SEROQUEL) tab 25 mg 25 mg Oral Nightly PRN   melatonin tab TABS 3 mg 3 mg Oral Nightly   aspirin EC tab 81 mg 81 mg Oral Daily   QUEtiapine Fumarate (SEROQUEL) tab 100

## 2019-01-18 NOTE — CM/SW NOTE
CTL progression of care note:  Pt was restless,combative and agitated during the night shift - MD ordered Haldol 2mg - pt was more cooperative and kept CPAP in place. Wound vac dressing changed today per Wound Care RN.  Per MD note vac can be discontinue

## 2019-01-18 NOTE — PAYOR COMM NOTE
REF: [id number]  APPROVED  12/28/18-1/17/19  REQUESTING ADDITIONAL DAYS      1/17/19  SUBJECTIVE:  Remains altered but less combative   Na trending up   Cr better     OBJECTIVE:     Total Intake/Output:     Intake/Output Summary (Last 24 hours) at 1/17/201 (PROTONIX) EC tab 20 mg 20 mg Oral QAM AC   Pravastatin Sodium (PRAVACHOL) tab 20 mg 20 mg Oral Nightly   Vancomycin HCl (FIRVANQ) 50 MG/ML oral solution 125 mg 125 mg Oral Daily   Senna (SENOKOT) tab TABS 17.2 mg 17.2 mg Oral Nightly   Normal Saline Flush Powd Pack Take 17 g by mouth daily as needed. allopurinol 300 MG Oral Tab Take 1 tablet (300 mg total) by mouth daily.         LABS:  Patient Labs Reviewed in Detail.  Pertinent Labs as follows:            Lab Results   Component Value Date      (H) 40 mg, Subcutaneous, Daily  •  Meropenem (MERREM) 500 mg in sodium chloride 0.9% 100 mL MBP, 500 mg, Intravenous, Q24H  •  QUEtiapine Fumarate (SEROQUEL) tab 25 mg, 25 mg, Oral, Nightly PRN  •  melatonin tab TABS 3 mg, 3 mg, Oral, Nightly  •  aspirin EC ta Axillary, resp. rate 18, height 5' 6\" (1.676 m), weight 237 lb 11.2 oz (107.8 kg), SpO2 92 %. Temp (24hrs), Av.5 °F (36.4 °C), Min:96.8 °F (36 °C), Max:98.4 °F (36.9 °C)        HEENT: Exam is unremarkable.     Lungs: Clear to auscultation bilaterally

## 2019-01-18 NOTE — PROGRESS NOTES
NEPHROLOGY DAILY PROGRESS NOTE     Follow up Reason: LIN     SUBJECTIVE:  Remains altered but less combative   Na improved  Cr stable    OBJECTIVE:    Total Intake/Output:    Intake/Output Summary (Last 24 hours) at 1/18/2019 1436  Last data filed at 1/18/ 25 mg Oral Daily   Pantoprazole Sodium (PROTONIX) EC tab 20 mg 20 mg Oral QAM AC   Pravastatin Sodium (PRAVACHOL) tab 20 mg 20 mg Oral Nightly   Vancomycin HCl (FIRVANQ) 50 MG/ML oral solution 125 mg 125 mg Oral Daily   Senna (SENOKOT) tab TABS 17.2 mg 17. 2 (two) times daily. PEG 3350 Oral Powd Pack Take 17 g by mouth daily as needed. allopurinol 300 MG Oral Tab Take 1 tablet (300 mg total) by mouth daily. LABS:  Patient Labs Reviewed in Detail.  Pertinent Labs as follows:  Lab Results   Component

## 2019-01-18 NOTE — PHYSICAL THERAPY NOTE
PHYSICAL THERAPY TREATMENT NOTE - INPATIENT     Room Number: 442/442-A       Presenting Problem: (L hip revision)    Problem List  Active Problems:    Wound infection    Episodic mood disorder (HCC)      PHYSICAL THERAPY ASSESSMENT     Pt seen daily.  Pt ed person does the patient currently need. ..   -   Moving to and from a bed to a chair (including a wheelchair)?: A Lot   -   Need to walk in hospital room?: Total   -   Climbing 3-5 steps with a railing?: Total     AM-PAC Score:  Raw Score: 9   Approx Degree

## 2019-01-18 NOTE — WOUND PROGRESS NOTE
Pt seen for right hip incisional vac dressing change. Amanda Starks RN present to assist with positioning. The right hip wound vac dressing was removed, area cleansed with saline. The staples remain intact, edges approximated.  Mepitel one non adherent was pl

## 2019-01-18 NOTE — PROGRESS NOTES
[facility] AND [facility] Infectious Disease Progress Note    Smiley Dailey Patient Status:  Inpatient    1940 MRN [id number]   Location [facility] 2W/SW Attending Carlitos Correia MD   Hosp Day # 21 PCP Martell Lorenzo MD     Subjective:  Pt co 50 MG/ML oral solution 125 mg, 125 mg, Oral, Daily  •  Senna (SENOKOT) tab TABS 17.2 mg, 17.2 mg, Oral, Nightly  •  Normal Saline Flush 0.9 % injection 3 mL, 3 mL, Intravenous, PRN  •  acetaminophen (TYLENOL) tab 650 mg, 650 mg, Oral, Q6H PRN  •  PEG 3350 until 2/11/19 followed by suppressive abx if no plans for 2 stage    If you have any questions or concerns please call [facility] at [phone].      Lory Martinez NP

## 2019-01-19 RX ORDER — DEXTROSE MONOHYDRATE 50 MG/ML
INJECTION, SOLUTION INTRAVENOUS CONTINUOUS
Status: DISCONTINUED | OUTPATIENT
Start: 2019-01-19 | End: 2019-01-22

## 2019-01-19 NOTE — PROGRESS NOTES
[facility] Infectious Disease Progress Note    Arleen Goemz Patient Status:  Inpatient    1940 MRN [id number]   Location [facility] 2W/SW Attending Carol Weston MD   Hosp Day # 25 PCP Adrian Solis MD     Subjective:  Pt ve Vancomycin HCl (FIRVANQ) 50 MG/ML oral solution 125 mg, 125 mg, Oral, Daily  •  Senna (SENOKOT) tab TABS 17.2 mg, 17.2 mg, Oral, Nightly  •  Normal Saline Flush 0.9 % injection 3 mL, 3 mL, Intravenous, PRN  •  acetaminophen (TYLENOL) tab 650 mg, 650 mg, Or erythema  -resolving  -on IV vancomycin, will d/c soon  3. AMS  -worse today  -psych on consult  -possibly from carbapenem, meropenem dose lowered  4. Elevated CRP  -continue to trend  5. CKD  6.   Dispo  -anticipate 6 weeks of IV meropenem and PO flucon

## 2019-01-19 NOTE — PROGRESS NOTES
[facility]    10/6/2017 12:44 PM    Max heart rate: 100% 146, 95% 139, 85% 124    75 year old    Wt Readings from Last 1 Encounters:   09/28/17 81.1 kg      Ht Readings from Last 1 Encounters:   09/28/17 5' 8\" (1.727 m)       Patient Type: Outpatient    Cardiac Markers: No    Reason for test: cardiomyopathy    Primary MD: SAHRA Rouse     Ordering MD: MADDIE Nolan      Cardiologist Performing Test:NIRANJAN Ricardo     --------------------------------------------------------------------------------------------------------------------  HISTORY OF: Heart Attack, Bypass Surgery, Defibrillator - Cut off rate: icd, Smoker and High Cholesterol, cad, syncope, cardiomyopathy, murmur, 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    Current Outpatient Prescriptions   Medication Sig Dispense Refill   • Multiple Vitamins-Minerals (MULTIVITAMIN ADULTS 50+ PO) Take 1 tablet by mouth daily.     • fenofibrate 160 MG tablet TAKE 1 TABLET BY MOUTH DAILY 90 tablet 0   • lisinopril (ZESTRIL) 2.5 MG tablet TAKE 1 TABLET BY MOUTH DAILY 90 tablet 0   • MAGNESIUM OXIDE PO      • fluticasone (FLONASE) 50 MCG/ACT nasal spray Spray 2 sprays in each nostril daily. 16 g 3   • carvedilol (COREG) 6.25 MG tablet Take 1 tablet by mouth 2 times daily (with meals). 180 tablet 3   • Misc Natural Products (RED WINE COMPLEX) CAPS Take  by mouth.     • Coenzyme Q10 (COQ10) 100 MG CAPS Take  by mouth. Has been on this 30 capsule    • aspirin (ECOTRIN) 81 MG EC tablet Take 81 mg by mouth daily.       • Cholecalciferol (VITAMIN D) 2000 UNITS tablet Take 2,000 Units by mouth daily.       • Omega-3 Fatty Acids (FISH OIL) 1200 MG capsule Take 1,200 mg by mouth 2 times daily.       • Garlic 100 MG TABS Take 1 tablet by mouth daily.         No current facility-administered medications for this encounter.        ALLERGIES:   Allergen Reactions   • Statins        MEETS CRITERIA FOR STRESS  NEPHROLOGY DAILY PROGRESS NOTE     Follow up Reason: LIN     SUBJECTIVE:  Remains altered but less combative   Na worse  Cr stable    OBJECTIVE:    Total Intake/Output:    Intake/Output Summary (Last 24 hours) at 1/19/2019 1322  Last data filed at 1/19/201 Pantoprazole Sodium (PROTONIX) EC tab 20 mg 20 mg Oral QAM AC   Pravastatin Sodium (PRAVACHOL) tab 20 mg 20 mg Oral Nightly   Vancomycin HCl (FIRVANQ) 50 MG/ML oral solution 125 mg 125 mg Oral Daily   Senna (SENOKOT) tab TABS 17.2 mg 17.2 mg Oral Nightly TEST: Yes         NOTES: Explained walking nuclear stress test to patient. No comments or concerns.    TYPE OF TEST: Lexiscan    ABLE TO WALK: Safely     NEEDED: No   daily.   PEG 3350 Oral Powd Pack Take 17 g by mouth daily as needed. allopurinol 300 MG Oral Tab Take 1 tablet (300 mg total) by mouth daily. LABS:  Patient Labs Reviewed in Detail.  Pertinent Labs as follows:  Lab Results   Component Value Date

## 2019-01-19 NOTE — PROGRESS NOTES
[facility] Hospitalist Progress Note     CC: Hospital Follow up    PCP: Nya Bowie MD       Assessment/Plan:     Active Problems:    Wound infection    Episodic mood disorder [facility])    Mr. Helane Lundborg is a 70 yo male [address] [employer] sig for bladder ca s/p chemo, BPH, since surgery  - Toe-touch weightbearing right lower extremity, PT/OT for transfers only no active ambulation, Hip precautions  - per ortho Lovenox for DVT prophylaxis for 28 days– would like to hold Plavix, but is ok with aspirin until this is completed t bleeding concerns, holding all AC    -BB continued   -statin continued  - severe CAD with diffuse LAD disease and Lcx with PCI and acute closure, stress test in 12/2017 was reported as normal  -patient with pre op eval by primary cardiologist on Dr. Atilio Leblanc 3rd surgery this month  - continuing on BB, statin  - patient needs urgent surgery   - patient is HIGH RISK for surgery, would not recommend further testing prior to surgery, optimized with BB and volume status  - cards will follow loyda operatively     FN: 01/18/19   1130  01/19/19   0512   RBC  2.91*  2.88*  2.87*   HGB  8.3*  8.3*  8.3*   HCT  26.0*  25.8*  25.4*   MCV  89.5  89.6  88.5   MCH  28.6  29.0  29.0   MCHC  32.0  32.3  32.8   RDW  16.7*  17.0*  17.3*   WBC  6.1  5.7  5.0   PLT  198  193  203

## 2019-01-19 NOTE — PROGRESS NOTES
[facility]    Cardiology Progress Note    Jeanna Lee Patient Status:  Inpatient    1940 MRN [id number]   Location [facility] 4W/SW/SE Attending Roberto Crum MD   Hosp Day # 25 PCP Gosia Hare MD       Impression/Kathy ([address]      Objective:   Temp: 97.6 °F (36.4 °C)  Pulse: 97  Resp: 18  BP: 136/63    Intake/Output:     Intake/Output Summary (Last 24 hours) at 1/19/2019 0657  Last data filed at 1/19/2019 0622  Gross per 24 hour   Intake 120 ml   Output 170 ml   Net -50 m the right hip.   Within the subcutaneous fat beneath the skin staples there is a fluid collection measuring approximately 6 x 3 x 20 cm (AP x transverse x CC) however it is not completely included in   the field of imaging and the cephalad extent of this co (Adjusted) Intravenous Q24H   Enoxaparin Sodium (LOVENOX) 40 MG/0.4ML injection 40 mg 40 mg Subcutaneous Daily   Meropenem (MERREM) 500 mg in sodium chloride 0.9% 100 mL  mg Intravenous Q24H   QUEtiapine Fumarate (SEROQUEL) tab 25 mg 25 mg Oral Nigh

## 2019-01-19 NOTE — PHYSICAL THERAPY NOTE
PHYSICAL THERAPY TREATMENT NOTE - INPATIENT     Room Number: 442/442-A       Presenting Problem: (L hip revision)    Problem List  Active Problems:    Wound infection    Episodic mood disorder (HCC)      PHYSICAL THERAPY ASSESSMENT     Pt seen daily.  Pt ed with arms (e.g., wheelchair, bedside commode, etc.): Unable   -   Moving from lying on back to sitting on the side of the bed?: A Lot   How much help from another person does the patient currently need. ..   -   Moving to and from a bed to a chair (includin

## 2019-01-20 NOTE — PROGRESS NOTES
[facility] Hospitalist Progress Note     CC: Hospital Follow up    PCP: Lorri Shelton MD       Assessment/Plan:     Active Problems:    Wound infection    Episodic mood disorder [facility])    Mr. Nancy Lange is a 68 yo male [employer] sig for bladder ca s/p chemo, BPH, changes) ordered will continue  - on ppx oral vanco  - Ortho and ID consulted  - 12/31 s/p explant of right JOHN, with placement of antibiotic spacer with Dr. Antoinette Gomez  - wound vac in place since surgery  - Toe-touch weightbearing right lower extremity, PT/OT disease  -holding asa and palvix given surgery, discussed with Dr. Miguelina Tuttle who is ok with asa 81mg when h/h stable, but recommended to hold plavix for 28 days (while on lovneox) with bleeding concerns, holding all AC    -BB continued   -statin continued  - s was cleared for surgery   - recent ECHO done prior to surgery: ECHO 12/6/18  EF 50-55%, LVH mild, no sig valvular abnormalities, diastolic dys, mild MR, TR  - this would be patient 3rd surgery this month  - continuing on BB, statin  - patient needs urgent right hip, right upper ext  Neuro: Grossly normal, CN intact, sensory intact  Psych: Affect confused  SKIN: dry scaly, flaking skin over entire body  EXT: no edema      Data Review:       Labs:     Recent Labs   Lab  01/18/19   1130  01/19/19   0512  01/20

## 2019-01-20 NOTE — CONSULTS
Neurology Inpatient Consult Note    Nayan Guthrie : [id number]   Referring Physician: Dr. Erin Pizano  HPI:     Nayan Guthrie is a 69year old male who is being seen in neurologic evaluation.     Patient being seen in evaluation for altered ment REVISION Right 12/31/2018    Performed by Sherrie Ventura DO at [facility] OR   • HIP TOTAL ARTHROPLASTY REVISION Right 12/18/2018    Performed by Sherrie Ventura DO at [facility] OR   • HIP TOTAL ARTHROPLASTY REVISION Right 12/6/2018    Performed by Hansel Nunez, hallucinating (e.g. making motion of taking medications that are not there)    Cranial Nerves: pupils equal, round, and reactive to light; extraocular movements intact; facial sensation–unable to assess, face symmetric, hearing impaired, dysarthria  Motor: entry; reflects patient evaluation yesterday afternoon, as well as this morning

## 2019-01-20 NOTE — PROGRESS NOTES
NEPHROLOGY DAILY PROGRESS NOTE     Follow up Reason: LIN     SUBJECTIVE:  Remains altered but less so today  Na improved   Cr stable    OBJECTIVE:    Total Intake/Output:    Intake/Output Summary (Last 24 hours) at 1/20/2019 1352  Last data filed at 1/20/2 AC   Pravastatin Sodium (PRAVACHOL) tab 20 mg 20 mg Oral Nightly   Vancomycin HCl (FIRVANQ) 50 MG/ML oral solution 125 mg 125 mg Oral Daily   Senna (SENOKOT) tab TABS 17.2 mg 17.2 mg Oral Nightly   Normal Saline Flush 0.9 % injection 3 mL 3 mL Intravenous needed. allopurinol 300 MG Oral Tab Take 1 tablet (300 mg total) by mouth daily. LABS:  Patient Labs Reviewed in Detail.  Pertinent Labs as follows:  Lab Results   Component Value Date     (H) 01/20/2019    K 4.4 01/20/2019     (H) 01/20

## 2019-01-20 NOTE — PLAN OF CARE
ANXIETY    • Will report anxiety at manageable levels Progressing        CONFUSION    • Confusion, delirium, dementia or psychosis is improved or at baseline Progressing        Delirium    • Minimize duration of delirium Progressing        Diabetes/Glucose 58

## 2019-01-20 NOTE — PROGRESS NOTES
[facility]    Cardiology Progress Note    Dara Hamilton Patient Status:  Inpatient    1940 MRN [id number]   Location [facility] 4W/SW/SE Attending Mally Elizondo MD   Hosp Day # 23 PCP Anson Morales MD       Impression/Kathy Temp: 98.4 °F (36.9 °C)  Pulse: 102  Resp: 18  BP: 115/66    Intake/Output:     Intake/Output Summary (Last 24 hours) at 1/20/2019 0730  Last data filed at 1/20/2019 5350  Gross per 24 hour   Intake 2183 ml   Output 215 ml   Net 1968 ml       Last 3 Weig it is not completely included in   the field of imaging and the cephalad extent of this collection cannot be determined.   This likely represents a postprocedural fluid collection such as a seroma however a hematoma or infectious collection could give this Daily   Meropenem (MERREM) 500 mg in sodium chloride 0.9% 100 mL  mg Intravenous Q24H   QUEtiapine Fumarate (SEROQUEL) tab 25 mg 25 mg Oral Nightly PRN   melatonin tab TABS 3 mg 3 mg Oral Nightly   aspirin EC tab 81 mg 81 mg Oral Daily   QUEtiapine

## 2019-01-20 NOTE — PHYSICAL THERAPY NOTE
PHYSICAL THERAPY TREATMENT NOTE - INPATIENT     Room Number: 442/442-A       Presenting Problem: revision of L JOHN with antibiotic spacer placed    Problem List  Active Problems:    Wound infection    Episodic mood disorder (HCC)      PHYSICAL THERAPY ASSE promotion;Repositioning;Relaxation;Breathing techniques    BALANCE                                                                                                                     Static Sitting: Fair +  Dynamic Sitting: Fair           Static Standing: assistance and maintain TTWB RLE   Goal #3   Current Status NT due to mental status   Goal #4 Patient will tolerate standing x 3 min with BUE support on RW and maintain TTWB RLE   Goal #4   Current Status NT due to mental status   Goal #5 Patient verbalize

## 2019-01-21 PROCEDURE — 99223 1ST HOSP IP/OBS HIGH 75: CPT | Performed by: OTHER

## 2019-01-21 RX ORDER — CLOPIDOGREL BISULFATE 75 MG/1
75 TABLET ORAL DAILY
Status: DISCONTINUED | OUTPATIENT
Start: 2019-01-21 | End: 2019-01-23

## 2019-01-21 RX ORDER — MAGNESIUM OXIDE 400 MG (241.3 MG MAGNESIUM) TABLET
400 TABLET ONCE
Status: COMPLETED | OUTPATIENT
Start: 2019-01-21 | End: 2019-01-21

## 2019-01-21 NOTE — PROGRESS NOTES
[facility] AND [facility] Infectious Disease  Progress Note    Ginny Tubbs Patient Status:  Inpatient    1940 MRN [id number]   Location [facility] 4W/SW/SE Attending Parker Dugan MD   Hosp Day # 24 PCP MD Piter Underwood placement of an antibiotic spacer - per family no certain plans for a 2nd stage, OR cultures with candida parapsilosis  - On IV meropenem (off invanz, adjusted to lower dose given AMS), IV vancomycin, and p.o.  Fluconazole  - s/p IR drain placement 1/14 for

## 2019-01-21 NOTE — PLAN OF CARE
CONFUSION    • Confusion, delirium, dementia or psychosis is improved or at baseline Not Progressing          ANXIETY    • Will report anxiety at manageable levels Progressing        Delirium    • Minimize duration of delirium Progressing        DISCHARGE

## 2019-01-21 NOTE — PROGRESS NOTES
NEPHROLOGY DAILY PROGRESS NOTE     Follow up Reason: LIN     SUBJECTIVE:  Awake and alert today!  A+O x 3   Na improved   Cr stable    OBJECTIVE:    Total Intake/Output:    Intake/Output Summary (Last 24 hours) at 1/21/2019 1146  Last data filed at 1/21/201 Pantoprazole Sodium (PROTONIX) EC tab 20 mg 20 mg Oral QAM AC   Pravastatin Sodium (PRAVACHOL) tab 20 mg 20 mg Oral Nightly   Vancomycin HCl (FIRVANQ) 50 MG/ML oral solution 125 mg 125 mg Oral Daily   Senna (SENOKOT) tab TABS 17.2 mg 17.2 mg Oral Nightly daily.   PEG 3350 Oral Powd Pack Take 17 g by mouth daily as needed. allopurinol 300 MG Oral Tab Take 1 tablet (300 mg total) by mouth daily. LABS:  Patient Labs Reviewed in Detail.  Pertinent Labs as follows:  Lab Results   Component Value Date

## 2019-01-21 NOTE — PHYSICAL THERAPY NOTE
PHYSICAL THERAPY HIP TREATMENT NOTE - INPATIENT    Room Number: 442/442-A            Presenting Problem: revision of L JOHN with antibiotic spacer placed    Problem List  Active Problems:    Wound infection    Episodic mood disorder ([address]      PH[facility]ICAL [facility] Hospital\"    OBJECTIVE  Precautions: JOHN - posterior;Bed/chair alarm;Hard of hearing(Wound vac, severe confusion)    WEIGHT BEARING RESTRICTION  Weight Bearing Restriction: R lower extremity        R Lower Extremity: Toe Touch Weight Bearing       PAIN AS Goal #1   Current Status NT   Goal #2 Patient is able to demonstrate transfers Sit to/from Stand at assistance level: moderate assistance maintaining TTWB RLE with RW      Goal #2  Current Status NT   Goal #3 Patient is able to perform bed to/from chair

## 2019-01-21 NOTE — PROGRESS NOTES
[facility] Hospitalist Progress Note     CC: Hospital Follow up    PCP: Steven Baugh MD       Assessment/Plan:     Active Problems:    Delirium    Wound infection    Episodic mood disorder [facility])    Mr. Sagrario Cevallos is a 68 yo male [address] [employer] sig for bladder ca s/p 1/14 from ertapenem with mental status changes) ordered will continue  - on ppx oral vanco  - Ortho and ID consulted  - 12/31 s/p explant of right JOHN, with placement of antibiotic spacer with Dr. Loraine Mello  - wound vac in place since surgery  - Toe-touch zunilda home lasix     #Coronary artery disease  -holding asa and palvix given surgery, discussed with Dr. Nicole Martinez who is ok with asa 81mg when h/h stable, but recommended to hold plavix for 28 days (while on lovneox) with bleeding concerns, holding all AC    -BB co [facility], and was cleared for surgery   - recent ECHO done prior to surgery: ECHO 12/6/18  EF 50-55%, LVH mild, no sig valvular abnormalities, diastolic dys, mild MR, TR  - this would be patient 3rd surgery this month  - continuing on B sensory intact  Psych: Affect confused  SKIN: dry scaly, flaking skin over entire body  EXT: no edema      Data Review:       Labs:     Recent Labs   Lab  01/19/19   0512  01/20/19   0517  01/21/19   0453   RBC  2.87*  2.76*  2.52*   HGB  8.3*  8.3*  7.3*

## 2019-01-21 NOTE — PAYOR COMM NOTE
--------------  CONTINUED STAY REVIEW    Payor: [employer] MEDICARE ADV PPO  Subscriber #:  [id number]  Authorization Number: [id number]    Admit date: 12/28/18  Admit time: 1949    Admitting Physician: Connor Jones MD  Attending Physician:  Mati Cooper, mg Oral Kenna Leblanc RN      ferrous sulfate EC tab 325 mg     Date Action Dose Route User    1/20/2019 1800 Given 325 mg Oral Kenna Leblanc RN    1/20/2019 0800 Given 325 mg Oral Kenna Leblanc RN      fluconazole (DIFLUCAN) tab 200 mg     Date Action Karley Lozano Dose Route User    1/20/2019 2110 Given 100 mg Oral Shelli Underwood RN      Senna (SENOKOT) tab TABS 17.2 mg     Date Action Dose Route User    1/20/2019 2110 Given 17.2 mg Oral Shelli Underwood RN      Vancomycin HCl (VANCOCIN) 1,000 mg in sodi

## 2019-01-21 NOTE — PROGRESS NOTES
[facility] HOSP - [facility]    Cardiology Progress Note    Michael Alcantar Patient Status:  Inpatient    1940 MRN [id number]   Location [facility] 4W/SW/SE Attending Les Lozano MD   Hosp Day # 24 PCP Mariel Tuttle MD       Impression/Kathy 147/69    Intake/Output:     Intake/Output Summary (Last 24 hours) at 1/21/2019 0757  Last data filed at 1/21/2019 0428  Gross per 24 hour   Intake 2427 ml   Output 120 ml   Net 2307 ml       Last 3 Weights  01/18/19 0607 : 237 lb 11.2 oz (107.8 kg)  12/28 imaging and the cephalad extent of this collection cannot be determined. This likely represents a postprocedural fluid collection such as a seroma however a hematoma or infectious collection could give this appearance.   Due to beam   hardening artifact ca Meropenem (MERREM) 500 mg in sodium chloride 0.9% 100 mL  mg Intravenous Q24H   QUEtiapine Fumarate (SEROQUEL) tab 25 mg 25 mg Oral Nightly PRN   melatonin tab TABS 3 mg 3 mg Oral Nightly   aspirin EC tab 81 mg 81 mg Oral Daily   QUEtiapine Uli Perez

## 2019-01-21 NOTE — DIETARY NOTE
Brief Nutrition note. Screened at no nutritional risk at admission. Chart review and visit to rescreen for nutritional risk. Stable wt hx with BMI of 38.37 kg/m2--obese  Po intake improved the past few meals--%, prior was 50% and less.   Pt with

## 2019-01-21 NOTE — OCCUPATIONAL THERAPY NOTE
OCCUPATIONAL THERAPY TREATMENT NOTE - INPATIENT    Room Number: 442/442-A         Presenting Problem: (explant and spacer placement)     Problem List  Active Problems:    Wound infection    Episodic mood disorder ([facility] [address]      OCCUPATIONAL THERAPY ASSESSMENT patients with this level of impairment may benefit from BULL. DISCHARGE RECOMMENDATIONS  OT Discharge Recommendations: Sub-acute rehabilitation  OT Device Recommendations: TBD    PLAN  OT Treatment Plan: UE strengthening/ROM; Cognitive reorientation    CORTEZ OFH seated EOB with Min A for sitting balance  Comment: pt unable at this time secondary to lethargy; completed long sit  In supine   Pt will perform bed mobiltiy with mod A for particiaption in supine self care 1x  Comment: dep.  Pt transferred to chair wi

## 2019-01-21 NOTE — WOUND PROGRESS NOTE
Pt seen for right hip incisional vac dressing change. Pt is more alert today and following directions. Pt is on a SVETA mattress. Bedside rn present to assist with positioning. The wound vac dressing was removed, area cleansed with saline.  The staples remain

## 2019-01-21 NOTE — PROGRESS NOTES
Kolton Herrera is a 69year old male. No chief complaint on file. HPI:    Confused at times but conversant    REVIEW OF SYSTEMS:   A comprehensive 11 point review of systems was completed. Pertinent positives and negatives noted in the the HPI. REVISION Right 12/18/2018    Performed by Omar Navarro DO at [facility] OR   • HIP TOTAL ARTHROPLASTY REVISION Right 12/6/2018    Performed by Omar Navarro DO at [facility] OR   • SPINE SURGERY PROCEDURE UNLISTED      fusion   • TOTAL HIP REPLACEMENT R questions or concerns please call [facility] at [phone].                  Lab Results   Component Value Date    WBC 6.0 01/20/2019    HGB 8.3 01/20/2019    HCT 24.5 01/20/2019     01/20/2019    CREATSERUM 1.50 01/20/2019    BUN 26 01/20/2019    NA Creatinine 1.68 (H) 0.50 - 1.50 mg/dL    Calcium, Total 8.4 (L) 8.5 - 10.5 mg/dL    BUN/CREA Ratio 20.2 (H) 10.0 - 20.0    Anion Gap 10 0 - 18 mmol/L    Calculated Osmolality 298 (H) 275 - 295 mOsm/kg    GFR, Non- 39 (L) >=60    GFR, [place]pa PROTHROMBIN TIME (PT)   Result Value Ref Range    PT 16.9 (H) 11.8 - 14.5 seconds    INR 1.4 (H) 0.9 - 1.2   EXPANDED BLOOD GAS, ARTERIAL   Result Value Ref Range    Sample Site Arterial Line     ABG pH 7.51 (H) 7.35 - 7.45    ABG pCO2 27 (L) 35 - 45 mm 10.0 - 20.0    Anion Gap 8 0 - 18 mmol/L    Calculated Osmolality 288 275 - 295 mOsm/kg    GFR, Non- 32 (L) >=60    GFR, -American 37 (L) >=60   HEMOGLOBIN + HEMATOCRIT   Result Value Ref Range    HGB 7.0 (LL) 13.5 - 17.5 g/dL    HCT g/dl    RDW 16.8 (H) 11.0 - 15.0 %    PLT 99 (L) 140 - 400 K/UL    MPV 9.2 7.4 - 10.3 fL   BASIC METABOLIC PANEL (8)   Result Value Ref Range    Glucose 101 (H) 70 - 99 mg/dL    Sodium 140 136 - 144 mmol/L    Potassium 4.1 3.3 - 5.1 mmol/L    Chloride 109  (L) 140 - 400 K/UL    MPV 9.1 7.4 - 10.3 fL   BASIC METABOLIC PANEL (8)   Result Value Ref Range    Glucose 97 70 - 99 mg/dL    Sodium 138 136 - 144 mmol/L    Potassium 4.1 3.3 - 5.1 mmol/L    Chloride 106 95 - 110 mmol/L    CO2 25 22 - 32 mmol/L 26 22 - 32 mmol/L    BUN 25 (H) 8 - 20 mg/dL    Creatinine 1.53 (H) 0.50 - 1.50 mg/dL    Calcium, Total 7.7 (L) 8.5 - 10.5 mg/dL    BUN/CREA Ratio 16.3 10.0 - 20.0    Anion Gap 11 0 - 18 mmol/L    Calculated Osmolality 295 275 - 295 mOsm/kg    GFR, Non-Afr Analyzer [id number] CCU    HEMOGLOBIN + HEMATOCRIT   Result Value Ref Range    HGB 7.3 (L) 13.5 - 17.5 g/dL    HCT 21.7 (L) 41.0 - 52.0 %   URINALYSIS WITH CULTURE REFLEX   Result Value Ref Range    Urine Color Yellow Yellow    Clarity Urine Clear Clear    Sp 32.0 pg    MCHC 33.7 32.0 - 37.0 g/dl    RDW 16.3 (H) 11.0 - 15.0 %     140 - 400 K/UL    MPV 8.4 7.4 - 10.3 fL   BASIC METABOLIC PANEL (8)   Result Value Ref Range    Glucose 100 (H) 70 - 99 mg/dL    Sodium 138 136 - 144 mmol/L    Potassium 4.3 3.3 Sodium 141 136 - 144 mmol/L    Potassium 4.3 3.3 - 5.1 mmol/L    Chloride 107 95 - 110 mmol/L    CO2 25 22 - 32 mmol/L    BUN 23 (H) 8 - 20 mg/dL    Creatinine 1.52 (H) 0.50 - 1.50 mg/dL    Calcium, Total 8.5 8.5 - 10.5 mg/dL    BUN/CREA Ratio 15.1 10.0 8.3 (L) 8.5 - 10.5 mg/dL    BUN/CREA Ratio 16.9 10.0 - 20.0    Anion Gap 11 0 - 18 mmol/L    Calculated Osmolality 304 (H) 275 - 295 mOsm/kg    GFR, Non- 36 (L) >=60    GFR, -American 42 (L) >=60   MAGNESIUM   Result Value Ref Range Range    WBC 5.0 4.0 - 11.0 K/UL    RBC 2.87 (L) 4.50 - 5.90 M/UL    HGB 8.3 (L) 13.5 - 17.5 g/dL    HCT 25.4 (L) 41.0 - 52.0 %    MCV 88.5 80.0 - 100.0 fL    MCH 29.0 27.0 - 32.0 pg    MCHC 32.8 32.0 - 37.0 g/dl    RDW 17.3 (H) 11.0 - 15.0 %     14 1.50 mg/dL    Calcium, Total 8.6 8.5 - 10.5 mg/dL    BUN/CREA Ratio 17.3 10.0 - 20.0    Anion Gap 11 0 - 18 mmol/L    Calculated Osmolality 310 (H) 275 - 295 mOsm/kg    GFR, Non- 44 (L) >=60    GFR, -American 51 (L) >=60   ABORH (BLO 5100    ABORH (BLOOD TYPE)   Result Value Ref Range    ABO BLOOD TYPE O     RH BLOOD TYPE Positive    ANTIBODY SCREEN   Result Value Ref Range    Antibody Screen Negative    PREPARE RBC   Result Value Ref Range    Blood Product [id number]     Unit Number L12 Prosthesis Of Right Hip (Hcc).           Gross Description       The specimen is received without fixative labeled \"June, retained hardware right hip\" and consists of three pieces of structurally intact orthopedic hardware, including an elongated metall 18-24 hrs.     AEROBIC BACTERIAL CULTURE   Result Value Ref Range    Aerobic Culture Result 1+ growth Candida albicans (A)     Aerobic Smear 1+ WBCs seen     Aerobic Smear No organisms seen    ANAEROBIC CULTURE   Result Value Ref Range    Anaerobic Culture Monocyte % 11 %    Eosinophil % 2 %    Basophil % 2 %    Neutrophil Absolute 4.4 1.8 - 7.7 K/UL    Lymphocyte Absolute 0.7 (L) 1.0 - 4.0 K/UL    Monocyte Absolute 0.7 0.0 - 1.0 K/UL    Eosinophil Absolute 0.1 0.0 - 0.7 K/UL    Basophil Absolute 0.1 0.0 - 0 Monocyte % 9 %    Eosinophil % 1 %    Basophil % 0 %    Neutrophil Absolute 6.6 1.8 - 7.7 K/UL    Lymphocyte Absolute 1.1 1.0 - 4.0 K/UL    Monocyte Absolute 0.8 0.0 - 1.0 K/UL    Eosinophil Absolute 0.1 0.0 - 0.7 K/UL    Basophil Absolute 0.0 0.0 - 0.2 Eosinophil % 3 %    Basophil % 1 %    Neutrophil Absolute 3.7 1.8 - 7.7 K/UL    Lymphocyte Absolute 1.3 1.0 - 4.0 K/UL    Monocyte Absolute 0.5 0.0 - 1.0 K/UL    Eosinophil Absolute 0.2 0.0 - 0.7 K/UL    Basophil Absolute 0.0 0.0 - 0.2 K/UL   CBC W/ DIFFER 1 %    Neutrophil Absolute 4.7 1.8 - 7.7 K/UL    Lymphocyte Absolute 1.4 1.0 - 4.0 K/UL    Monocyte Absolute 0.7 0.0 - 1.0 K/UL    Eosinophil Absolute 0.3 0.0 - 0.7 K/UL    Basophil Absolute 0.1 0.0 - 0.2 K/UL    Anisocytosis 2+ (A)      Polychromasia 1+

## 2019-01-21 NOTE — PROGRESS NOTES
[facility] dosing service    Follow-up Pharmacokinetic Consult for Vancomycin Dosing     Kolton Herrera is a 69year old male admitted on 12/28 who is being treated for wound infection . Patient is on day 5 of Vancomycin 1.25 gm IV Q 24 hours.   Goal Status: Abnormal    Collection Time: 01/14/19 10:12 AM   Result Value Ref Range    Aerobic Culture Result 1+ growth Candida albicans (A) N/A    Aerobic Smear 1+ WBCs seen N/A    Aerobic Smear No organisms seen N/A   3. URINE CULTURE, ROUTINE     Status: No

## 2019-01-21 NOTE — PLAN OF CARE
RN and PCT were moving bed in room and Prema VANG found upper dentures behind bed wheel. RN notified patient's son Sera Katz that dentures were found and security as well.

## 2019-01-22 PROCEDURE — 99231 SBSQ HOSP IP/OBS SF/LOW 25: CPT | Performed by: OTHER

## 2019-01-22 RX ORDER — MAGNESIUM OXIDE 400 MG (241.3 MG MAGNESIUM) TABLET
400 TABLET ONCE
Status: COMPLETED | OUTPATIENT
Start: 2019-01-22 | End: 2019-01-22

## 2019-01-22 NOTE — PHYSICAL THERAPY NOTE
PHYSICAL THERAPY TREATMENT NOTE - INPATIENT     Room Number: 442/442-A       Presenting Problem: revision of L JOHN with antibiotic spacer placed    Problem List  Active Problems:    Delirium    Wound infection    Episodic mood disorder ([facility] [address]      PHYSICAL RECOMMENDATIONS  PT Discharge Recommendations: Sub-acute rehabilitation     PLAN  PT Treatment Plan: Bed mobility; Body mechanics; Endurance; Energy conservation;Patient education;Gait training;Strengthening;Transfer training;Balance training    SUBJECTIVE  \ ABILITY STATUS  Gait Assessment   Gait Assistance: Not tested  Distance (ft): (/)  Assistive Device: (/)  Stoop/Curb Assistance: Not tested  Comment : pt performed rolling x 2 to L and x 1 to R at MOD A, able to hold self with rail on side ~2 min for clean

## 2019-01-22 NOTE — PROGRESS NOTES
[facility]    Cardiology Progress Note    Ty Counts Patient Status:  Inpatient    1940 MRN [id number]   Location [facility] 4W/SW/SE Attending Vaishnavi Rodriguez MD   Hosp Day # 25 PCP Licha Kelly MD       Impr 82  Resp: 18  BP: 125/52    Intake/Output:     Intake/Output Summary (Last 24 hours) at 1/22/2019 1012  Last data filed at 1/22/2019 0602  Gross per 24 hour   Intake 2410 ml   Output 444 ml   Net 1966 ml       Last 3 Weights  01/22/19 0439 : (!) 317 lb 12. --    CREATSERUM  1.77*  1.52*   --   1.42  1.39  1.44  1.50  1.50   --    CA  8.3*  8.5   --   8.5  8.6  8.8  8.6  8.3*   --    MG  1.9  1.9   --    --    --    --    --   1.7*  1.6*   GLU  103*  92   --   103*  103*  154*  116*  106*   --     < > = carson mg 17.2 mg Oral Nightly   Normal Saline Flush 0.9 % injection 3 mL 3 mL Intravenous PRN   acetaminophen (TYLENOL) tab 650 mg 650 mg Oral Q6H PRN   PEG 3350 (MIRALAX) powder packet 17 g 17 g Oral Daily PRN   magnesium hydroxide (MILK OF MAGNESIA) 400 MG/5ML

## 2019-01-22 NOTE — PROGRESS NOTES
Neurology Inpatient Follow-up Note      HPI:     Patient being seen in follow-up. Although he is mildly confused, patient is significantly improved today.   He says he has a very vague recollection of recent events, stating he feels like he has been in [place] further inpatient recommendations. Please page with any questions / concerns.     Richard Lizama MD  [place] [phone]

## 2019-01-22 NOTE — PLAN OF CARE
ANXIETY    • Will report anxiety at manageable levels Progressing        CONFUSION    • Confusion, delirium, dementia or psychosis is improved or at baseline Progressing        Delirium    • Minimize duration of delirium Progressing        Diabetes/Glucose bedside, education and updates provided to daughter and son.

## 2019-01-22 NOTE — OCCUPATIONAL THERAPY NOTE
OCCUPATIONAL THERAPY TREATMENT NOTE - INPATIENT    Room Number: 442/442-A         Presenting Problem: (explant and spacer placement)     Problem List  Active Problems:    Delirium    Wound infection    Episodic mood disorder (HCC)      OCCUPATIONAL THERAPY level of impairment may benefit from BULL. DISCHARGE RECOMMENDATIONS  OT Discharge Recommendations: Sub-acute rehabilitation  OT Device Recommendations: TBD    PLAN  OT Treatment Plan: UE strengthening/ROM; Cognitive reorientation    SUBJECTIVE  Agreeable balance  Comment: progress   Pt will perform bed mobiltiy with mod A for particiaption in supine self care 1x  Comment: Mod A + additional assist for RLE management / precaution maintenance     Patient will independently recall posterior hip precautions 1x

## 2019-01-22 NOTE — PROGRESS NOTES
[facility] AND [facility] Infectious Disease  Progress Note    Ginny Tubbs Patient Status:  Inpatient    1940 MRN [id number]   Location [facility] 4W/SW/SE Attending Parker Dugan MD   Hosp Day # 25 PCP MD Piter Underwood placement 1/14 for bleeding complications     2.  New arm erythema to the LUE  - Improving  - IV vancomycin course complete     3.  Encephalopathy - some hallucinations - improving  - Multifactorial  - Some carbapenem effect as well?  Now changed back to l

## 2019-01-22 NOTE — PAYOR COMM NOTE
REF: [id number]  APPROVED 12/28/18-1/17/19  - DAYS PENDING IN IEXCHANGE 1/17/19-1/20/19- REQUESTING ADDITIONAL DAYS        1/20/19  Assessment/Plan:      Active Problems:    Wound infection     Mr. Saad Ellis is a 70 yo male [employer] sig for bladder ca s/p mahesh continue  - on ppx oral vanco  - Ortho and ID consulted  - 12/31 s/p explant of right JOHN, with placement of antibiotic spacer with Dr. Bernice Pierre  - wound vac in place since surgery  - Toe-touch weightbearing right lower extremity, PT/OT for transfers only no palvix given surgery, discussed with Dr. Indiana Clarke who is ok with asa 81mg when h/h stable, but recommended to hold plavix for 28 days (while on lovneox) with bleeding concerns, holding all AC    -BB continued   -statin continued  - severe CAD with diffuse LAD patient 3rd surgery this month  - continuing on BB, statin  - patient needs urgent surgery   - patient is HIGH RISK for surgery, would not recommend further testing prior to surgery, optimized with BB and volume status  - cards will follow loyda operatively mg Subcutaneous Daily   • meropenem  500 mg Intravenous Q24H   • melatonin  3 mg Oral Nightly   • aspirin EC  81 mg Oral Daily   • QUEtiapine Fumarate  100 mg Oral Nightly   • escitalopram  10 mg Oral Daily   • fluconazole  200 mg Oral Q24H   • MINERIN   T Hip   - Hx of Right JOHN with revision on 12/6  - #s/p I&D with poly and head exchange and antibiotic beads on 12/18, DC'ed on IV invanz  - ID followed last admit, PICC line placed 12/18, plan for 6 weeks IV abx with invanz and plan on po suppression therea Imtiaz, hold lovenox starting 1/8, ok to resume 1/16 as h/h improved  - ortho recommended IR evaluation for bleeding, angiogram without acute bleeding  - CT hip shows large fluid collection, seroma vs. Hematoma, d/w Dr. Delores Hendrix, see above regarding drain     #A facility      #Chronic bilateral low back pain with bilateral sciatica/chronic opiate use  -Tylenol and tramadol for pain  - wean as able     #Obesity- BMI 36  -o/p weight loss endeavors      Pre op  - patient without chest pain or sob, but obvious functio 2.76*  2.52*   HGB  8.3*  8.3*  7.3*   HCT  25.4*  24.5*  22.5*   MCV  88.5  88.6  89.1   MCH  29.0  30.1  28.8   MCHC  32.8  34.0  32.3   RDW  17.3*  18.9*  17.0*   WBC  5.0  6.0  4.8   PLT  203  337  146                  Recent Labs   Lab  01/19/19   190

## 2019-01-22 NOTE — WOUND PROGRESS NOTE
Pt seen for wound vac check. Pt is alert today and offers no complaints re wound vac. Vac is running at 125 mmHg and a patent seal is detected. Next vac dressing change is planned for tomorrow.

## 2019-01-22 NOTE — PROGRESS NOTES
NEPHROLOGY DAILY PROGRESS NOTE     Follow up Reason: LIN     SUBJECTIVE:  Awake and alert today A+O x 3   Na improved   Cr stable  Eating well per nursing    OBJECTIVE:    Total Intake/Output:    Intake/Output Summary (Last 24 hours) at 1/22/2019 5745 [facility] (PRAVACHOL) tab 20 mg 20 mg Oral Nightly   Vancomycin HCl (FIRVANQ) 50 MG/ML oral solution 125 mg 125 mg Oral Daily   Senna (SENOKOT) tab TABS 17.2 mg 17.2 mg Oral Nightly   Normal Saline Flush 0.9 % injection 3 mL 3 mL Intravenous PRN   acetaminophen (TYL MG Oral Tab Take 1 tablet (300 mg total) by mouth daily. LABS:  Patient Labs Reviewed in Detail.  Pertinent Labs as follows:  Lab Results   Component Value Date     01/22/2019    K 4.3 01/22/2019    K 4.2 01/22/2019     01/22/2019    CO2 1

## 2019-01-22 NOTE — CM/SW NOTE
1/23:  The pt. Is scheduled to discharge to [facility] in [facility] today 1/23 at 530p, via ambulance due to being a max assist requiring a lift for transfer and confusion. The pt's son Helga Daniel is aware and agreeable to the discharge.       Report 8

## 2019-01-22 NOTE — PROGRESS NOTES
[facility] Hospitalist Progress Note     CC: Hospital Follow up    PCP: Monica Argueta MD       Assessment/Plan:     Active Problems:    Delirium    Wound infection    Episodic mood disorder [facility])    Mr. Solano is a 68 yo male [address] [employer] sig for bladder ca s/p records  - IV vanc dc'ed 1/21; meropenem (changed on 1/14 from ertapenem with mental status changes) ordered will continue  - on ppx oral vanco  - Ortho and ID consulted  - 12/31 s/p explant of right JOHN, with placement of antibiotic spacer with Dr. Gita Boyd gentle IVF started, switched to D5 1/2 NS on 1/17 due to hypernatremia, now off  - renal following, holding home lasix     #Coronary artery disease  -holding asa and palvix given surgery, discussed with Dr. Xuan Luna who is ok with asa 81mg when h/h stable, bu similar to prior baseline  - patient with pre op eval by primary cardiologist on Dr. Devante Pretty on 11/23/18 at [facility] cardiology [place], and was cleared for surgery   - recent ECHO done prior to surgery: ECHO 12/6/18  EF 50-55%, LVH mild, no sig valvu extremities, dressing and wound vac in place on right hip, right upper ext  Neuro: Grossly normal, CN intact, sensory intact  Psych: Affect confused  SKIN: dry scaly, flaking skin over entire body  EXT: no edema      Data Review:       Labs:     Recent Lab

## 2019-01-23 ENCOUNTER — APPOINTMENT (OUTPATIENT)
Dept: PICC SERVICES | Facility: HOSPITAL | Age: 79
DRG: 466 | End: 2019-01-23
Attending: INTERNAL MEDICINE
Payer: MEDICARE

## 2019-01-23 VITALS
WEIGHT: 315 LBS | SYSTOLIC BLOOD PRESSURE: 128 MMHG | BODY MASS INDEX: 50.62 KG/M2 | DIASTOLIC BLOOD PRESSURE: 51 MMHG | HEART RATE: 85 BPM | OXYGEN SATURATION: 100 % | TEMPERATURE: 97 F | RESPIRATION RATE: 18 BRPM | HEIGHT: 66 IN

## 2019-01-23 PROCEDURE — 02HV33Z INSERTION OF INFUSION DEVICE INTO SUPERIOR VENA CAVA, PERCUTANEOUS APPROACH: ICD-10-PCS | Performed by: INTERNAL MEDICINE

## 2019-01-23 RX ORDER — FLUCONAZOLE 200 MG/1
200 TABLET ORAL DAILY
Qty: 20 TABLET | Refills: 0 | Status: SHIPPED | OUTPATIENT
Start: 2019-01-23 | End: 2019-01-01

## 2019-01-23 RX ORDER — ASPIRIN 81 MG/1
81 TABLET ORAL DAILY
Qty: 30 TABLET | Refills: 0 | Status: SHIPPED | OUTPATIENT
Start: 2019-01-24

## 2019-01-23 RX ORDER — LIDOCAINE HYDROCHLORIDE 10 MG/ML
0.5 INJECTION, SOLUTION INFILTRATION; PERINEURAL ONCE AS NEEDED
Status: DISCONTINUED | OUTPATIENT
Start: 2019-01-23 | End: 2019-01-23

## 2019-01-23 RX ORDER — ESCITALOPRAM OXALATE 10 MG/1
10 TABLET ORAL DAILY
Qty: 30 TABLET | Refills: 0 | Status: SHIPPED | OUTPATIENT
Start: 2019-01-24 | End: 2019-01-01

## 2019-01-23 RX ORDER — MAGNESIUM OXIDE 400 MG (241.3 MG MAGNESIUM) TABLET
400 TABLET ONCE
Status: COMPLETED | OUTPATIENT
Start: 2019-01-23 | End: 2019-01-23

## 2019-01-23 NOTE — PAYOR COMM NOTE
REF: [id number] APPROVED 12/28/18-1/17/19 - DAYS PENDING IN IEXCHANGE 1/17/19-1/20/19-  CLINICALS FAXED ON 1/18/19, 1/21/19 AND 1/22/19, FAXING UPDATE FOR 1/22/19    REQUESTING ADDITIONAL DAYS      1/22/19  Assessment/Plan:      Active Problems:    Deliriu drainage and fever  - BC at OSH 1/2 bottles with [facility], will follow, request records  - IV vanc dc'ed 1/21; meropenem (changed on 1/14 from ertapenem with mental status changes) ordered will continue  - on ppx oral vanco  - Ortho and ID consulted  - 12/31 s/ OSH but improved    -monitor cre: stable  -monitor UOP closely  -worse 1/15, gentle IVF started, switched to D5 1/2 NS on 1/17 due to hypernatremia, now off  - renal following, holding home lasix     #Coronary artery disease  -holding asa and palvix given much better. Denies pain. Eating some. Wound vac in place     OBJECTIVE:     Blood pressure 129/52, pulse 82, temperature 97.7 °F (36.5 °C), temperature source Oral, resp. rate 20, height 5' 6\" (1.676 m), weight (!) 317 lb 12.8 oz (144.2 kg), SpO2 99 %. Oral QAM AC   • Pravastatin Sodium  20 mg Oral Nightly   • Vancomycin HCl  125 mg Oral Daily   • Senna  17.2 mg Oral Nightly

## 2019-01-23 NOTE — DISCHARGE SUMMARY
[facility] Internal Medicine Discharge Summary   Patient ID:  Cas Killian  [id number]  29 year old  4/4/1940    Admit date: 12/28/2018    Discharge date and time: 1/23/2019     Attending Physician: Elisa Matos MD     Primary Care Physician: Mary Kate Posadas Mr. Alonso Postal is a 70 yo male [address] [employer] sig for bladder ca s/p chemo, BPH, CKD 3, CAD, depression, HTN, HLD, SAM on cpap, PVD, chronic low back pain, chronic opiate use, obesity bmi 36, ARRIOLA, OA with prior R hip replacement with revision on 12/6, then reinfect - 12/31 s/p explant of right JOHN, with placement of antibiotic spacer with Dr. Anastasiya Silva  - wound vac in place since surgery  - Toe-touch weightbearing right lower extremity, PT/OT for transfers only no active ambulation, Hip precautions  - per ortho Lovenox f -holding asa and palvix given surgery, discussed with Dr. Angela Houge who is ok with asa 81mg when h/h stable, but recommended to hold plavix for 28 days (while on lovneox) with bleeding concerns, holding all AC    -BB continued   -statin continued  - severe CAD - patient with pre op eval by primary cardiologist on Dr. Bita Byers on 11/23/18 at [address], and was cleared for surgery   - recent ECHO done prior to surgery: ECHO 12/6/18  EF 50-55%, LVH mild, no sig valvular abnormalities, diastoli Apply 1 Application topically 2 (two) times daily. CHANGE how you take these medications    aspirin 81 MG Tbec  Take 1 tablet (81 mg total) by mouth daily.   Start taking on:  1/24/2019  What changed:    · medication strength  · how much to take  · w Take 2.5 mL (125 mg total) by mouth daily.         STOP taking these medications    Acetaminophen-Codeine #3 300-30 MG Tabs  Commonly known as:  TYLENOL #3           Where to Get Your Medications      These medications were sent to [address] [place] [address] PROCEDURE(S): Right lower extremity arteriogram PROCEDURE DATE: 1/09/2019 : Rhys Grullon MD CLINICAL INDICATION: Status post right total hip arthroplasty, with postoperative bleeding FLUOROSCOPY TIME: 6.2 min FLUOROSCOPY DOSE: 454 mGy CONTRAST: C02 plus 1 hemorrhage. There is diffuse atherosclerosis. Heaven Fernando.  Kaden Marrero MD    Xr Hip W Or Wo Pelvis 1 View, Right (cpt=73501)    Result Date: 12/31/2018  PROCEDURE: XR HIP W OR WO PELVIS 1 VIEW, RIGHT (CPT=73501)  COMPARISON: [address] [place] [address] PROCEDURE: IR ULTRASOUND-GUIDED SUBCUTANEOUS SEROMA DRAIN PLACEMENT OF RIGHT HIP  INDICATIONS: 60-year-old man status post revision of right hip arthroplasty with persistent subcutaneous seroma of right hip.   : MD Viviane  ANESTHESIA: The patien CONCLUSION:  1. Successful ultrasound-guided placement of drain into subcutaneous fluid collection of right hip. Aspirated serosanguineous fluid has been submitted for culture. 2. Drainage catheter is to remain attached to TOOTIE bulb.  The drainage catheter wi PROCEDURE: CT LOWER EXT RIGHT (WO IV) (CPT=73700)  COMPARISON: [facility], XR HIP W OR WO PELVIS 1 VIEW, RIGHT (CPT=73501), 12/18/2018, 8:59. [facility], XR HIP W OR WO PELVIS 1 VIEW, RIGHT (CPT=73501), 12/31/2018, 17:44. the residual bone there is a 55 x 32 x 70 mm soft tissue focus which could represent a combination of medullary bone and blood products. Scattered atherosclerosis in the right lower extremity. This fatty change throughout hamstrings muscle complex.   Ther CONCLUSION:  1. Post right hip arthroplasty. Elongated right femoral stem is present.   There is a known fracture around the intertrochanteric region of the femoral component, and between the anterior aspect of the arthroplasty in the residual femur in th

## 2019-01-23 NOTE — PROGRESS NOTES
[facility] HOSP - [facility]    Cardiology Progress Note    Sola Sparowdy Patient Status:  Inpatient    1940 MRN [id number]   Location [facility] 4W/SW/SE Attending Ayad Soler MD   Hosp Day # 26 PCP Alida Wilkins MD       Impr ([address]      Objective:   Temp: 96.7 °F (35.9 °C)  Pulse: 88  Resp: 18  BP: 135/51    Intake/Output:     Intake/Output Summary (Last 24 hours) at 1/23/2019 0829  Last data filed at 1/23/2019 0539  Gross per 24 hour   Intake 1168 ml   Output 200 ml   Net 968 1. 44  1.50  1.50  1.34   --    CA  8.5   < >  8.6  8.8  8.6  8.3*  8.2*   --    MG  1.9   --    --    --    --   1.7*  1.6*  1.8   GLU  92   < >  103*  154*  116*  106*  96   --     < > = values in this interval not displayed.        No results for input(s) 650 mg 650 mg Oral Q6H PRN   PEG 3350 (MIRALAX) powder packet 17 g 17 g Oral Daily PRN   magnesium hydroxide (MILK OF MAGNESIA) 400 MG/5ML suspension 30 mL 30 mL Oral Daily PRN   bisacodyl (DULCOLAX) rectal suppository 10 mg 10 mg Rectal Daily PRN   ondans

## 2019-01-23 NOTE — OCCUPATIONAL THERAPY NOTE
Pt not seen for OT at this time secondary to pt receiving PICC line. Will follow up with pt when appropriate.

## 2019-01-23 NOTE — PROGRESS NOTES
Vascular Access Note  Inserted by Ina JAUREGUI  Vascular Access Screening:   Allergies to Lidocaine: no  Allergies to Latex: no  Presence of Pacemaker/Defibrillator: No  Mastectomy with Lymph Node Dissection: No  AV Fistula / AV Graft: No  Dialysis Catheter

## 2019-01-23 NOTE — PROGRESS NOTES
[facility] AND [facility] Infectious Disease Progress Note    Leo Ramires Patient Status:  Inpatient    1940 MRN [id number]   Location [facility] 2W/SW Attending Jan Almendarez MD   Hosp Day # 32 PCP Monica Argueta MD     Subjective:  Pt sl packet 17 g, 17 g, Oral, Daily PRN  •  magnesium hydroxide (MILK OF MAGNESIA) 400 MG/5ML suspension 30 mL, 30 mL, Oral, Daily PRN  •  bisacodyl (DULCOLAX) rectal suppository 10 mg, 10 mg, Rectal, Daily PRN  •  ondansetron HCl (ZOFRAN) injection 4 mg, 4 mg, LYNDA HUGGINS

## 2019-01-23 NOTE — PLAN OF CARE
Problem: Patient Centered Care  Goal: Patient preferences are identified and integrated in the patient's plan of care  Interventions:  - What would you like us to know as we care for you?   - Provide timely, complete, and accurate information to patient/fa based on type and severity of pain and evaluate response  - Implement non-pharmacological measures as appropriate and evaluate response  - Consider cultural and social influences on pain and pain management  - Manage/alleviate anxiety  - Utilize distractio managing their own health  - Refer to Case Management Department for coordinating discharge planning if the patient needs post-hospital services based on physician/LIP order or complex needs related to functional status, cognitive ability or social support needed  Outcome: Progressing      Problem: Delirium  Goal: Minimize duration of delirium  Interventions:  - Encourage use of hearing aids, eye glasses  - Promote highest level of mobility daily  - Provide frequent reorientation  - Promote wakefulness i.e.

## 2019-01-23 NOTE — PROGRESS NOTES
NEPHROLOGY DAILY PROGRESS NOTE     Follow up Reason: LIN      SUBJECTIVE:  Resting comfortably in bed, just had PICC line placed   Denies SOB or pain       OBJECTIVE:    Total Intake/Output:    Intake/Output Summary (Last 24 hours) at 1/23/2019 1313  Last Pravastatin Sodium (PRAVACHOL) tab 20 mg 20 mg Oral Nightly   Vancomycin HCl (FIRVANQ) 50 MG/ML oral solution 125 mg 125 mg Oral Daily   Senna (SENOKOT) tab TABS 17.2 mg 17.2 mg Oral Nightly   Normal Saline Flush 0.9 % injection 3 mL 3 mL Intravenous PRN needed. allopurinol 300 MG Oral Tab Take 1 tablet (300 mg total) by mouth daily. LABS:  Patient Labs Reviewed in Detail.  Pertinent Labs as follows:  Lab Results   Component Value Date     01/23/2019    K 4.4 01/23/2019     01/23/2019

## 2019-01-24 NOTE — PAYOR COMM NOTE
--------------PATIENT HAS BEEN DISCHARGED. ALL CLINICALS HAVE BEEN FAXED. PLEASE FAX BACK APPROVED DAYS.   THANK YOU.    DISCHARGE REVIEW    Payor: BCBS MEDICARE ADV PPO  Subscriber #:  [id number]  Authorization Number: [id number]    Admit date: 12/28/1 Mr. Ron Vázquez is a 70 yo male [address] [employer] sig for bladder ca s/p chemo, BPH, CKD 3, CAD, depression, HTN, HLD, SAM on cpap, PVD, chronic low back pain, chronic opiate use, obesity bmi 36, ARRIOLA, OA with prior R hip replacement with revision on 12/6, then reinfect -ensure hearing aids in place  - pulling at lines, pulled picc on 1/16, PICC  now placed again 1/23 now that MS improved  -seroquel 100mg QHS     # Infected Right Hip   - Hx of Right JOHN with revision on 12/6  - #s/p I&D with poly and head exchange and ant -prior hgb 7.8-. 7.2 -> 7.8- > 7.5 -> 6.9 (POST OP) -> 8.2-> 6.9 -> 1 unit ordered -> 8 -> 7.1  ----> 6.3 -> 1 unit-> 7.3 -> 6.9---> 7.8-> 7.9-> 8.2-> 8.3-> 8.2-> 8.3 -->7.9  - s/p 8 Units of pRBC's on 12/31, 1 unit on 1/2, 1 unit 1/5, 1 unit 1/9, 1 unit 1 -cpap protocol  -encourage use with all sleep     #PAD (peripheral artery disease) ([address]  -resumed aspirin, once h/h stable but would recommend to hold plavix  surgery     #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 cataplexy  -provigil was held since prior admit as pt wa Neck: Supple, no JVD  Pulm: CTAB, no crackles or wheezes  CV: RRR, no murmurs  ABD: Soft, non-tender, non-distended, +BS  MSK: moves all extremities, dressing and wound vac in place on right hip, right upper ext  Neuro: Grossly normal, CN intact, sensory i escitalopram 20 MG Tabs  Commonly known as:  LEXAPRO  Take 1 tablet (20 mg total) by mouth daily. ferrous sulfate 325 (65 FE) MG Tbec  Take 1 tablet (325 mg total) by mouth 2 (two) times daily with meals.      Losartan Potassium 25 MG Tabs  Commonly kno PROCEDURE(S): Right lower extremity arteriogram PROCEDURE DATE: 1/09/2019 : Delaney Pugh MD CLINICAL INDICATION: Status post right total hip arthroplasty, with postoperative bleeding FLUOROSCOPY TIME: 6.2 min FLUOROSCOPY DOSE: 454 mGy CONTRAST: C02 plus 1 hemorrhage. There is diffuse atherosclerosis. Luci Cure.  Rhys Grullon MD    Xr Hip W Or Wo Pelvis 1 View, Right (cpt=73501)    Result Date: 12/31/2018  PROCEDURE: XR HIP W OR WO PELVIS 1 VIEW, RIGHT (CPT=73501)  COMPARISON: [address] [place] [address] PROCEDURE: IR ULTRASOUND-GUIDED SUBCUTANEOUS SEROMA DRAIN PLACEMENT OF RIGHT HIP  INDICATIONS: 31-year-old man status post revision of right hip arthroplasty with persistent subcutaneous seroma of right hip.   : MD Viviane  ANESTHESIA: The patien CONCLUSION:  1. Successful ultrasound-guided placement of drain into subcutaneous fluid collection of right hip. Aspirated serosanguineous fluid has been submitted for culture. 2. Drainage catheter is to remain attached to TOOTIE bulb.  The drainage catheter wi PROCEDURE: CT LOWER EXT RIGHT (WO IV) (CPT=73700)  COMPARISON: [facility], XR HIP W OR WO PELVIS 1 VIEW, RIGHT (CPT=73501), 12/18/2018, 8:59. [facility], XR HIP W OR WO PELVIS 1 VIEW, RIGHT (CPT=73501), 12/31/2018, 17:44. the residual bone there is a 55 x 32 x 70 mm soft tissue focus which could represent a combination of medullary bone and blood products. Scattered atherosclerosis in the right lower extremity. This fatty change throughout hamstrings muscle complex.   Ther CONCLUSION:  1. Post right hip arthroplasty. Elongated right femoral stem is present.   There is a known fracture around the intertrochanteric region of the femoral component, and between the anterior aspect of the arthroplasty in the residual femur in th Electronically signed by Munira Del Angel MD on 1/23/2019  2:46 PM         REVIEWER COMMENTS

## 2019-03-01 ENCOUNTER — HOSPITAL ENCOUNTER (INPATIENT)
Facility: HOSPITAL | Age: 79
LOS: 4 days | Discharge: NURSING, REHAB OR LONG-TERM CARE | DRG: 920 | End: 2019-03-05
Attending: EMERGENCY MEDICINE | Admitting: HOSPITALIST
Payer: MEDICARE

## 2019-03-01 DIAGNOSIS — I82.403 ACUTE DEEP VEIN THROMBOSIS (DVT) OF BOTH LOWER EXTREMITIES, UNSPECIFIED VEIN (HCC): ICD-10-CM

## 2019-03-01 DIAGNOSIS — L03.119 CELLULITIS OF LOWER EXTREMITY, UNSPECIFIED LATERALITY: Primary | ICD-10-CM

## 2019-03-01 LAB
ALBUMIN SERPL-MCNC: 2 G/DL (ref 3.4–5)
ALP LIVER SERPL-CCNC: 184 U/L (ref 45–117)
ALT SERPL-CCNC: 13 U/L (ref 16–61)
ANION GAP SERPL CALC-SCNC: 9 MMOL/L (ref 0–18)
APTT PPP: 54.3 SECONDS (ref 23.2–35.3)
AST SERPL-CCNC: 11 U/L (ref 15–37)
BASOPHILS # BLD AUTO: 0.03 X10(3) UL (ref 0–0.2)
BASOPHILS NFR BLD AUTO: 0.4 %
BILIRUB DIRECT SERPL-MCNC: 0.2 MG/DL (ref 0–0.2)
BILIRUB SERPL-MCNC: 0.4 MG/DL (ref 0.1–2)
BUN BLD-MCNC: 44 MG/DL (ref 7–18)
BUN/CREAT SERPL: 30.8 (ref 10–20)
CALCIUM BLD-MCNC: 8.8 MG/DL (ref 8.5–10.1)
CHLORIDE SERPL-SCNC: 102 MMOL/L (ref 98–107)
CO2 SERPL-SCNC: 24 MMOL/L (ref 21–32)
CREAT BLD-MCNC: 1.43 MG/DL (ref 0.7–1.3)
DEPRECATED RDW RBC AUTO: 54.4 FL (ref 35.1–46.3)
EOSINOPHIL # BLD AUTO: 0.09 X10(3) UL (ref 0–0.7)
EOSINOPHIL NFR BLD AUTO: 1.1 %
ERYTHROCYTE [DISTWIDTH] IN BLOOD BY AUTOMATED COUNT: 17.1 % (ref 11–15)
GLUCOSE BLD-MCNC: 130 MG/DL (ref 70–99)
HCT VFR BLD AUTO: 25.6 % (ref 39–53)
HGB BLD-MCNC: 7.7 G/DL (ref 13–17.5)
IMM GRANULOCYTES # BLD AUTO: 0.06 X10(3) UL (ref 0–1)
IMM GRANULOCYTES NFR BLD: 0.8 %
INR BLD: 2.15 (ref 0.9–1.2)
LACTATE SERPL-SCNC: 2.7 MMOL/L (ref 0.4–2)
LYMPHOCYTES # BLD AUTO: 0.95 X10(3) UL (ref 1–4)
LYMPHOCYTES NFR BLD AUTO: 11.9 %
M PROTEIN MFR SERPL ELPH: 6.5 G/DL (ref 6.4–8.2)
MCH RBC QN AUTO: 26.6 PG (ref 26–34)
MCHC RBC AUTO-ENTMCNC: 30.1 G/DL (ref 31–37)
MCV RBC AUTO: 88.6 FL (ref 80–100)
MONOCYTES # BLD AUTO: 0.92 X10(3) UL (ref 0.1–1)
MONOCYTES NFR BLD AUTO: 11.5 %
MRSA DNA SPEC QL NAA+PROBE: POSITIVE
NEUTROPHILS # BLD AUTO: 5.94 X10 (3) UL (ref 1.5–7.7)
NEUTROPHILS # BLD AUTO: 5.94 X10(3) UL (ref 1.5–7.7)
NEUTROPHILS NFR BLD AUTO: 74.3 %
OSMOLALITY SERPL CALC.SUM OF ELEC: 293 MOSM/KG (ref 275–295)
PLATELET # BLD AUTO: 220 10(3)UL (ref 150–450)
POTASSIUM SERPL-SCNC: 4.5 MMOL/L (ref 3.5–5.1)
PROTHROMBIN TIME: 24.2 SECONDS (ref 11.8–14.5)
RBC # BLD AUTO: 2.89 X10(6)UL (ref 3.8–5.8)
RH BLOOD TYPE: POSITIVE
SODIUM SERPL-SCNC: 135 MMOL/L (ref 136–145)
WBC # BLD AUTO: 8 X10(3) UL (ref 4–11)

## 2019-03-01 PROCEDURE — 87147 CULTURE TYPE IMMUNOLOGIC: CPT | Performed by: EMERGENCY MEDICINE

## 2019-03-01 PROCEDURE — 96365 THER/PROPH/DIAG IV INF INIT: CPT

## 2019-03-01 PROCEDURE — 85025 COMPLETE CBC W/AUTO DIFF WBC: CPT | Performed by: EMERGENCY MEDICINE

## 2019-03-01 PROCEDURE — 80048 BASIC METABOLIC PNL TOTAL CA: CPT | Performed by: EMERGENCY MEDICINE

## 2019-03-01 PROCEDURE — 85730 THROMBOPLASTIN TIME PARTIAL: CPT | Performed by: EMERGENCY MEDICINE

## 2019-03-01 PROCEDURE — 99285 EMERGENCY DEPT VISIT HI MDM: CPT

## 2019-03-01 PROCEDURE — 36415 COLL VENOUS BLD VENIPUNCTURE: CPT

## 2019-03-01 PROCEDURE — 87186 SC STD MICRODIL/AGAR DIL: CPT | Performed by: EMERGENCY MEDICINE

## 2019-03-01 PROCEDURE — 80076 HEPATIC FUNCTION PANEL: CPT | Performed by: EMERGENCY MEDICINE

## 2019-03-01 PROCEDURE — 86900 BLOOD TYPING SEROLOGIC ABO: CPT | Performed by: EMERGENCY MEDICINE

## 2019-03-01 PROCEDURE — 85610 PROTHROMBIN TIME: CPT | Performed by: EMERGENCY MEDICINE

## 2019-03-01 PROCEDURE — 87040 BLOOD CULTURE FOR BACTERIA: CPT | Performed by: EMERGENCY MEDICINE

## 2019-03-01 PROCEDURE — 86901 BLOOD TYPING SEROLOGIC RH(D): CPT | Performed by: EMERGENCY MEDICINE

## 2019-03-01 PROCEDURE — 96375 TX/PRO/DX INJ NEW DRUG ADDON: CPT

## 2019-03-01 PROCEDURE — 83605 ASSAY OF LACTIC ACID: CPT | Performed by: EMERGENCY MEDICINE

## 2019-03-01 PROCEDURE — 87077 CULTURE AEROBIC IDENTIFY: CPT | Performed by: EMERGENCY MEDICINE

## 2019-03-01 PROCEDURE — 87641 MR-STAPH DNA AMP PROBE: CPT | Performed by: EMERGENCY MEDICINE

## 2019-03-01 RX ORDER — METOCLOPRAMIDE HYDROCHLORIDE 5 MG/ML
10 INJECTION INTRAMUSCULAR; INTRAVENOUS EVERY 8 HOURS PRN
Status: DISCONTINUED | OUTPATIENT
Start: 2019-03-01 | End: 2019-03-05

## 2019-03-01 RX ORDER — MORPHINE SULFATE 4 MG/ML
4 INJECTION, SOLUTION INTRAMUSCULAR; INTRAVENOUS ONCE
Status: COMPLETED | OUTPATIENT
Start: 2019-03-01 | End: 2019-03-01

## 2019-03-01 RX ORDER — ONDANSETRON 2 MG/ML
4 INJECTION INTRAMUSCULAR; INTRAVENOUS EVERY 6 HOURS PRN
Status: DISCONTINUED | OUTPATIENT
Start: 2019-03-01 | End: 2019-03-05

## 2019-03-01 RX ORDER — SODIUM CHLORIDE 0.9 % (FLUSH) 0.9 %
3 SYRINGE (ML) INJECTION AS NEEDED
Status: DISCONTINUED | OUTPATIENT
Start: 2019-03-01 | End: 2019-03-05

## 2019-03-01 RX ORDER — ACETAMINOPHEN 325 MG/1
650 TABLET ORAL EVERY 6 HOURS PRN
Status: DISCONTINUED | OUTPATIENT
Start: 2019-03-01 | End: 2019-03-05

## 2019-03-02 LAB
ANION GAP SERPL CALC-SCNC: 9 MMOL/L (ref 0–18)
BASOPHILS # BLD AUTO: 0.02 X10(3) UL (ref 0–0.2)
BASOPHILS NFR BLD AUTO: 0.4 %
BUN BLD-MCNC: 43 MG/DL (ref 7–18)
BUN/CREAT SERPL: 30.9 (ref 10–20)
CALCIUM BLD-MCNC: 8.5 MG/DL (ref 8.5–10.1)
CHLORIDE SERPL-SCNC: 103 MMOL/L (ref 98–107)
CO2 SERPL-SCNC: 23 MMOL/L (ref 21–32)
CREAT BLD-MCNC: 1.39 MG/DL (ref 0.7–1.3)
DEPRECATED HBV CORE AB SER IA-ACNC: >2000 NG/ML (ref 30–530)
DEPRECATED RDW RBC AUTO: 54.4 FL (ref 35.1–46.3)
EOSINOPHIL # BLD AUTO: 0.15 X10(3) UL (ref 0–0.7)
EOSINOPHIL NFR BLD AUTO: 2.8 %
ERYTHROCYTE [DISTWIDTH] IN BLOOD BY AUTOMATED COUNT: 16.8 % (ref 11–15)
GLUCOSE BLD-MCNC: 96 MG/DL (ref 70–99)
HCT VFR BLD AUTO: 24.6 % (ref 39–53)
HGB BLD-MCNC: 7.4 G/DL (ref 13–17.5)
IMM GRANULOCYTES # BLD AUTO: 0.05 X10(3) UL (ref 0–1)
IMM GRANULOCYTES NFR BLD: 0.9 %
INR BLD: 2.11 (ref 0.9–1.2)
IRON SATURATION: 13 % (ref 20–50)
IRON SERPL-MCNC: 23 UG/DL (ref 65–175)
LACTATE SERPL-SCNC: 0.8 MMOL/L (ref 0.4–2)
LYMPHOCYTES # BLD AUTO: 0.89 X10(3) UL (ref 1–4)
LYMPHOCYTES NFR BLD AUTO: 16.7 %
MCH RBC QN AUTO: 26.7 PG (ref 26–34)
MCHC RBC AUTO-ENTMCNC: 30.1 G/DL (ref 31–37)
MCV RBC AUTO: 88.8 FL (ref 80–100)
MONOCYTES # BLD AUTO: 0.66 X10(3) UL (ref 0.1–1)
MONOCYTES NFR BLD AUTO: 12.4 %
NEUTROPHILS # BLD AUTO: 3.55 X10 (3) UL (ref 1.5–7.7)
NEUTROPHILS # BLD AUTO: 3.55 X10(3) UL (ref 1.5–7.7)
NEUTROPHILS NFR BLD AUTO: 66.8 %
OSMOLALITY SERPL CALC.SUM OF ELEC: 291 MOSM/KG (ref 275–295)
PLATELET # BLD AUTO: 187 10(3)UL (ref 150–450)
POTASSIUM SERPL-SCNC: 3.9 MMOL/L (ref 3.5–5.1)
PROTHROMBIN TIME: 23.9 SECONDS (ref 11.8–14.5)
RBC # BLD AUTO: 2.77 X10(6)UL (ref 3.8–5.8)
SODIUM SERPL-SCNC: 135 MMOL/L (ref 136–145)
TOTAL IRON BINDING CAPACITY: 177 UG/DL (ref 240–450)
TRANSFERRIN SERPL-MCNC: 119 MG/DL (ref 200–360)
TRANSFERRIN SERPL-MCNC: 119 MG/DL (ref 200–360)
WBC # BLD AUTO: 5.3 X10(3) UL (ref 4–11)

## 2019-03-02 PROCEDURE — 83540 ASSAY OF IRON: CPT | Performed by: HOSPITALIST

## 2019-03-02 PROCEDURE — 85025 COMPLETE CBC W/AUTO DIFF WBC: CPT | Performed by: HOSPITALIST

## 2019-03-02 PROCEDURE — 83605 ASSAY OF LACTIC ACID: CPT | Performed by: EMERGENCY MEDICINE

## 2019-03-02 PROCEDURE — 85610 PROTHROMBIN TIME: CPT | Performed by: HOSPITALIST

## 2019-03-02 PROCEDURE — 82728 ASSAY OF FERRITIN: CPT | Performed by: HOSPITALIST

## 2019-03-02 PROCEDURE — 84466 ASSAY OF TRANSFERRIN: CPT | Performed by: HOSPITALIST

## 2019-03-02 PROCEDURE — 80048 BASIC METABOLIC PNL TOTAL CA: CPT | Performed by: HOSPITALIST

## 2019-03-02 PROCEDURE — 87040 BLOOD CULTURE FOR BACTERIA: CPT | Performed by: HOSPITALIST

## 2019-03-02 RX ORDER — CIPROFLOXACIN 500 MG/1
250 TABLET, FILM COATED ORAL DAILY
Status: DISCONTINUED | OUTPATIENT
Start: 2019-03-02 | End: 2019-03-05

## 2019-03-02 RX ORDER — PANTOPRAZOLE SODIUM 40 MG/1
40 TABLET, DELAYED RELEASE ORAL
Status: DISCONTINUED | OUTPATIENT
Start: 2019-03-02 | End: 2019-03-05

## 2019-03-02 RX ORDER — LIDOCAINE 4 G/G
1 PATCH TOPICAL DAILY
COMMUNITY
End: 2019-01-01 | Stop reason: ALTCHOICE

## 2019-03-02 RX ORDER — ASPIRIN 81 MG/1
81 TABLET ORAL DAILY
Status: DISCONTINUED | OUTPATIENT
Start: 2019-03-02 | End: 2019-03-05

## 2019-03-02 RX ORDER — CASTOR OIL AND BALSAM, PERU 788; 87 MG/G; MG/G
OINTMENT TOPICAL 2 TIMES DAILY PRN
Status: DISCONTINUED | OUTPATIENT
Start: 2019-03-02 | End: 2019-03-05

## 2019-03-02 RX ORDER — DOCUSATE SODIUM 100 MG/1
100 CAPSULE, LIQUID FILLED ORAL 2 TIMES DAILY
Status: DISCONTINUED | OUTPATIENT
Start: 2019-03-02 | End: 2019-03-02

## 2019-03-02 RX ORDER — CIPROFLOXACIN 250 MG/1
250 TABLET, FILM COATED ORAL DAILY
COMMUNITY
End: 2019-01-01

## 2019-03-02 RX ORDER — DOCUSATE SODIUM 100 MG/1
100 CAPSULE, LIQUID FILLED ORAL 2 TIMES DAILY
Status: DISCONTINUED | OUTPATIENT
Start: 2019-03-02 | End: 2019-03-05

## 2019-03-02 RX ORDER — LIDOCAINE 50 MG/G
1 PATCH TOPICAL EVERY 24 HOURS
Status: DISCONTINUED | OUTPATIENT
Start: 2019-03-02 | End: 2019-03-02

## 2019-03-02 RX ORDER — MELATONIN
325 2 TIMES DAILY WITH MEALS
Status: DISCONTINUED | OUTPATIENT
Start: 2019-03-02 | End: 2019-03-05

## 2019-03-02 RX ORDER — QUETIAPINE 100 MG/1
100 TABLET, FILM COATED ORAL NIGHTLY
Status: DISCONTINUED | OUTPATIENT
Start: 2019-03-02 | End: 2019-03-05

## 2019-03-02 RX ORDER — METOPROLOL SUCCINATE 25 MG/1
25 TABLET, EXTENDED RELEASE ORAL DAILY
Status: DISCONTINUED | OUTPATIENT
Start: 2019-03-02 | End: 2019-03-05

## 2019-03-02 RX ORDER — ALLOPURINOL 300 MG/1
300 TABLET ORAL DAILY
Status: DISCONTINUED | OUTPATIENT
Start: 2019-03-02 | End: 2019-03-05

## 2019-03-02 RX ORDER — LIDOCAINE 50 MG/G
2 PATCH TOPICAL EVERY 24 HOURS
Status: DISCONTINUED | OUTPATIENT
Start: 2019-03-02 | End: 2019-03-05

## 2019-03-02 RX ORDER — SENNOSIDES 8.6 MG
17.2 TABLET ORAL NIGHTLY
Status: DISCONTINUED | OUTPATIENT
Start: 2019-03-02 | End: 2019-03-05

## 2019-03-02 RX ORDER — ACETAMINOPHEN 325 MG/1
650 TABLET ORAL EVERY 6 HOURS PRN
Status: DISCONTINUED | OUTPATIENT
Start: 2019-03-02 | End: 2019-03-02

## 2019-03-02 RX ORDER — ESCITALOPRAM OXALATE 10 MG/1
10 TABLET ORAL DAILY
Status: DISCONTINUED | OUTPATIENT
Start: 2019-03-03 | End: 2019-03-05

## 2019-03-02 RX ORDER — LIDOCAINE 50 MG/G
2 PATCH TOPICAL EVERY 24 HOURS
Status: DISCONTINUED | OUTPATIENT
Start: 2019-03-03 | End: 2019-03-02

## 2019-03-02 RX ORDER — PRAVASTATIN SODIUM 20 MG
20 TABLET ORAL DAILY
Status: DISCONTINUED | OUTPATIENT
Start: 2019-03-02 | End: 2019-03-05

## 2019-03-02 RX ORDER — FLUCONAZOLE 100 MG/1
100 TABLET ORAL DAILY
Status: DISCONTINUED | OUTPATIENT
Start: 2019-03-02 | End: 2019-03-05

## 2019-03-02 RX ORDER — TRAMADOL HYDROCHLORIDE 50 MG/1
50 TABLET ORAL EVERY 4 HOURS PRN
Status: ON HOLD | COMMUNITY
End: 2019-03-05

## 2019-03-02 RX ORDER — TRAMADOL HYDROCHLORIDE 50 MG/1
50 TABLET ORAL EVERY 4 HOURS PRN
Status: DISCONTINUED | OUTPATIENT
Start: 2019-03-02 | End: 2019-03-05

## 2019-03-02 NOTE — H&P
[facility] Hospitalist H&P       CC: Patient presents with:  Abnormal Labs       PCP: Ginny Escalante MD    History of Present Illness: Mr. Shiv Fuentes is a 68 yo male [address] [employer] sig for bladder ca s/p chemo, BPH, CKD 3, CAD, depression, HTN, HLD, SAM on cpap, PVD, Osteoarthritis    • Peripheral vascular disease ([facility] 2016   • Personal history of antineoplastic chemotherapy    • Sleep apnea     CPAP        PSH  Past Surgical History:   Procedure Laterality Date   • CAROTID ENDARTERECTOMY Bilateral 2010   • HIP TOTAL Mucosa normal. No drainage. Throat: Lips, mucosa, and tongue normal. Teeth and gums normal.   Neck: Supple   Lungs:   Clear to auscultation bilaterally. Normal effort   Chest wall:  No tenderness or deformity.    Heart:  Regular rate and rhythm, S1, S2 no ext pain  - erythema noted bilateral lower ext on admit, improved today  - knees with pain upon movement but not swollen, no erythema on joint,   - no fever, normal WBC, LA now normal  - on IV vanco, continue for now  - no back pain or signs of radiculopat BB      #Ischemic cardiomyopathy EF 55%  -monitor fluid status closely  -continue BB   - ECHO 12/6/18  EF 50-55%, LVH mild, no sig valvular abnormalities, diastolic dys, mild MR, TR     #SAM on CPAP  -cpap protocol  -encourage use with all sleep     #PAD (

## 2019-03-02 NOTE — PLAN OF CARE
Problem: Patient Centered Care  Goal: Patient preferences are identified and integrated in the patient's plan of care  Interventions:  - What would you like us to know as we care for you?  - Provide timely, complete, and accurate information to patient/fam physical deficits and behaviors that affect risk of falls.   - [place] fall precautions as indicated by assessment.  - Educate pt/family on patient safety including physical limitations  - Instruct pt to call for assistance with activity based on assessme

## 2019-03-02 NOTE — PROGRESS NOTES
[facility]    CDI Prediction Tool Protocol (Vancomycin Initiated)    OVP (oral vancomycin prophylaxis) 125 mg PO Daily is being started in this patient based on a score of 14.       Score Breakdown:  High risk antibiotic use (5 points)  Malignanc

## 2019-03-02 NOTE — CONSULTS
Sabino Meckel is a 69year old male.    Patient presents with:  Abnormal Labs      HPI:    Recent hip explantation on rt with 6 weeks iv rx completed then po rx prohylaxis [cipro diflucan]  No fever chills but new onset redness pain rt>left leg and to le above.    PHYSICAL EXAM:   /59 (BP Location: Left arm)   Pulse 90   Temp 98.8 °F (37.1 °C) (Oral)   Resp 18   Ht 5' 6\" (1.676 m)   Wt 215 lb 9 oz (97.8 kg)   SpO2 100%   BMI 34.79 kg/m²   GENERAL:  Awake, alert, oriented x3.  Non-tox, non-septic and during the hospital encounter of 45/99/40   BASIC METABOLIC PANEL (8)   Result Value Ref Range    Glucose 130 (H) 70 - 99 mg/dL    Sodium 135 (L) 136 - 145 mmol/L    Potassium 4.5 3.5 - 5.1 mmol/L    Chloride 102 98 - 107 mmol/L    CO2 24.0 21.0 - 32.0 mmo mmol/L   FERRITIN   Result Value Ref Range    Ferritin >2,000.0 (H) 30.0 - 530.0 ng/mL   IRON AND TIBC   Result Value Ref Range    Iron 23 (L) 65 - 175 ug/dL    Transferrin 119 (L) 200 - 360 mg/dL    Total Iron Binding Capacity 177 (L) 240 - 450 ug/dL    % - 5.80 x10(6)uL    HGB 7.4 (L) 13.0 - 17.5 g/dL    HCT 24.6 (L) 39.0 - 53.0 %    MCV 88.8 80.0 - 100.0 fL    MCH 26.7 26.0 - 34.0 pg    MCHC 30.1 (L) 31.0 - 37.0 g/dL    RDW-SD 54.4 (H) 35.1 - 46.3 fL    RDW 16.8 (H) 11.0 - 15.0 %    .0 150.0 - 450.

## 2019-03-02 NOTE — ED PROVIDER NOTES
Patient Seen in: [facility] AND [facility] Emergency Department    History   Patient presents with:  Abnormal Labs    Stated Complaint: Abnormal labs    HPI    73-year-old male with past medical history significant for chronic back pain, bladder cancer, BPH, chr REVISION Right 12/18/2018    Performed by Omar Navarro DO at [facility] OR   • HIP TOTAL ARTHROPLASTY REVISION Right 12/6/2018    Performed by Oamr Navarro DO at [facility] OR   • SPINE SURGERY PROCEDURE UNLISTED      fusion   • TOTAL HIP REPLACEMENT R surgery wound, diffuse tenderness of the knees and right proximal thigh, normal range of motion. No deformity. Lymphadenopathy: No sig cervical LAD   Neurological: Awake, alert. Normal reflexes. No cranial nerve deficit. Skin: Skin is warm and dry.  No results for these tests on the individual orders.    LACTIC ACID RFLX DO NOT CANCEL   ABORH (BLOOD TYPE)   RAINBOW DRAW BLUE   RAINBOW DRAW LAVENDER   RAINBOW DRAW LIGHT GREEN   BLOOD CULTURE   BLOOD CULTURE   ED/MRSA SCREEN BY PCR-CC              Reviewed pm    Follow-up:  No follow-up provider specified. We recommend that you schedule follow up care with a primary care provider within the next three months to obtain basic health screening including reassessment of your blood pressure.     Medications Presc

## 2019-03-02 NOTE — ED NOTES
Orders for admission, patient is aware of plan and ready to go upstairs. Any questions, please call ED TAJ Quesada  at extension [phone]      Pt is from [facility] for the [address] [place] [address] for rehab after hip surgery. Pt is a/o x 3.  Pt states he is not in pain,

## 2019-03-02 NOTE — ED INITIAL ASSESSMENT (HPI)
Patient here from [facility] rehab for a Hgb of 7.1. Patient is also here to rule out bilateral DVT. Patient also had recent surgery on his right hip. Patient C/O bilateral leg pain. Family is en route.

## 2019-03-02 NOTE — PROGRESS NOTES
[facility] dosing service    Initial Pharmacokinetic Consult for Vancomycin Dosing     Heide Shukla is a 69year old male admitted on 3/1 who is being treated for cellulitis.   Pharmacy has been asked to dose Vancomycin by Dr. Dejah Ledesma    He has No K weight, renal function, and pharmacokinetics. 2.  Pharmacy ordered Vancomycin trough level(s) prior to 4rth dose. Goal trough level 10-15 ug/mL. 3.  Pharmacy will need BUN/Scr daily while on Vancomycin to assess renal function.     4.  Pharmacy will

## 2019-03-03 ENCOUNTER — APPOINTMENT (OUTPATIENT)
Dept: CV DIAGNOSTICS | Facility: HOSPITAL | Age: 79
DRG: 920 | End: 2019-03-03
Attending: HOSPITALIST
Payer: MEDICARE

## 2019-03-03 LAB
ALBUMIN SERPL-MCNC: 1.8 G/DL (ref 3.4–5)
ALBUMIN/GLOB SERPL: 0.4 {RATIO} (ref 1–2)
ALP LIVER SERPL-CCNC: 163 U/L (ref 45–117)
ALT SERPL-CCNC: 13 U/L (ref 16–61)
ANION GAP SERPL CALC-SCNC: 11 MMOL/L (ref 0–18)
AST SERPL-CCNC: 10 U/L (ref 15–37)
BASOPHILS # BLD AUTO: 0.02 X10(3) UL (ref 0–0.2)
BASOPHILS NFR BLD AUTO: 0.4 %
BASOPHILS NFR FLD: 0 %
BILIRUB SERPL-MCNC: 0.4 MG/DL (ref 0.1–2)
BUN BLD-MCNC: 42 MG/DL (ref 7–18)
BUN/CREAT SERPL: 32.3 (ref 10–20)
CALCIUM BLD-MCNC: 8.9 MG/DL (ref 8.5–10.1)
CHLORIDE SERPL-SCNC: 109 MMOL/L (ref 98–107)
CO2 SERPL-SCNC: 21 MMOL/L (ref 21–32)
COLOR FLD: YELLOW
CREAT BLD-MCNC: 1.3 MG/DL (ref 0.7–1.3)
DEPRECATED RDW RBC AUTO: 53.7 FL (ref 35.1–46.3)
EOSINOPHIL # BLD AUTO: 0.11 X10(3) UL (ref 0–0.7)
EOSINOPHIL NFR BLD AUTO: 2.3 %
EOSINOPHIL NFR FLD: 0 %
ERYTHROCYTE [DISTWIDTH] IN BLOOD BY AUTOMATED COUNT: 16.7 % (ref 11–15)
GLOBULIN PLAS-MCNC: 4.5 G/DL (ref 2.8–4.4)
GLUCOSE BLD-MCNC: 115 MG/DL (ref 70–99)
HAV IGM SER QL: 2.2 MG/DL (ref 1.6–2.6)
HCT VFR BLD AUTO: 24.3 % (ref 39–53)
HGB BLD-MCNC: 7.2 G/DL (ref 13–17.5)
IMM GRANULOCYTES # BLD AUTO: 0.03 X10(3) UL (ref 0–1)
IMM GRANULOCYTES NFR BLD: 0.6 %
LYMPHOCYTES # BLD AUTO: 0.73 X10(3) UL (ref 1–4)
LYMPHOCYTES NFR BLD AUTO: 15.5 %
LYMPHOCYTES NFR FLD: 1 %
M PROTEIN MFR SERPL ELPH: 6.3 G/DL (ref 6.4–8.2)
MCH RBC QN AUTO: 25.9 PG (ref 26–34)
MCHC RBC AUTO-ENTMCNC: 29.6 G/DL (ref 31–37)
MCV RBC AUTO: 87.4 FL (ref 80–100)
MONOCYTES # BLD AUTO: 0.61 X10(3) UL (ref 0.1–1)
MONOCYTES NFR BLD AUTO: 12.9 %
MONOCYTES NFR FLD: 5 %
NEUTROPHILS # BLD AUTO: 3.22 X10 (3) UL (ref 1.5–7.7)
NEUTROPHILS # BLD AUTO: 3.22 X10(3) UL (ref 1.5–7.7)
NEUTROPHILS NFR BLD AUTO: 68.3 %
NEUTROPHILS NFR FLD: 94 %
OSMOLALITY SERPL CALC.SUM OF ELEC: 303 MOSM/KG (ref 275–295)
PLATELET # BLD AUTO: 219 10(3)UL (ref 150–450)
POTASSIUM SERPL-SCNC: 4 MMOL/L (ref 3.5–5.1)
RBC # BLD AUTO: 2.78 X10(6)UL (ref 3.8–5.8)
RBC # FLD: ABNORMAL /CUMM (ref ?–1)
SODIUM SERPL-SCNC: 141 MMOL/L (ref 136–145)
WBC # BLD AUTO: 4.7 X10(3) UL (ref 4–11)
WBC # FLD: ABNORMAL /CUMM

## 2019-03-03 PROCEDURE — 85025 COMPLETE CBC W/AUTO DIFF WBC: CPT | Performed by: HOSPITALIST

## 2019-03-03 PROCEDURE — 0S9D3ZX DRAINAGE OF LEFT KNEE JOINT, PERCUTANEOUS APPROACH, DIAGNOSTIC: ICD-10-PCS | Performed by: ORTHOPAEDIC SURGERY

## 2019-03-03 PROCEDURE — 80053 COMPREHEN METABOLIC PANEL: CPT | Performed by: INTERNAL MEDICINE

## 2019-03-03 PROCEDURE — 89050 BODY FLUID CELL COUNT: CPT | Performed by: ORTHOPAEDIC SURGERY

## 2019-03-03 PROCEDURE — 89060 EXAM SYNOVIAL FLUID CRYSTALS: CPT | Performed by: ORTHOPAEDIC SURGERY

## 2019-03-03 PROCEDURE — 87070 CULTURE OTHR SPECIMN AEROBIC: CPT | Performed by: ORTHOPAEDIC SURGERY

## 2019-03-03 PROCEDURE — 93306 TTE W/DOPPLER COMPLETE: CPT | Performed by: HOSPITALIST

## 2019-03-03 PROCEDURE — 87205 SMEAR GRAM STAIN: CPT | Performed by: ORTHOPAEDIC SURGERY

## 2019-03-03 PROCEDURE — 89051 BODY FLUID CELL COUNT: CPT | Performed by: ORTHOPAEDIC SURGERY

## 2019-03-03 PROCEDURE — 83735 ASSAY OF MAGNESIUM: CPT | Performed by: HOSPITALIST

## 2019-03-03 NOTE — CONSULTS
[facility]    PATIENT'S NAME: Gela Hester   ATTENDING PHYSICIAN: Denver Cheung MD   CONSULTING PHYSICIAN: Bridgett Hodge MD   PATIENT ACCOUNT#:   [id number]    LOCATION:  [address] [place] [address] #:   [id number]       DATE OF BIRTH: oral lesions. LUNGS:  Clear. NECK:  Supple. No JVD or adenopathy. HEART:  Normal S1 and S2. No S3, S4, or murmur heard by me. ABDOMEN:  Nontender. No masses, rebound, or organomegaly.   EXTREMITIES:  The right lower extremity is a little more swollen

## 2019-03-03 NOTE — PROGRESS NOTES
[facility] Hospitalist Progress Note     CC: Hospital Follow up    PCP: Anson Morales MD       Assessment/Plan:     Principal Problem:    Cellulitis of lower extremity, unspecified laterality  Active Problems:    Cellulitis of lower extremity    Acute deep v with revision on 12/6  - #s/p I&D with poly and head exchange and antibiotic beads on 12/18  - 12/31 s/p explant of right JOHN, with placement of antibiotic spacer with Dr. Sierra Lozoya admit,   - completed 6 weeks of merop and fluc  - changed and tramadol for pain  - wean as able     #Obesity- BMI 36  -o/p weight loss endeavors     #Insomnia  -seroquel started per psych last admission with delerium/AMS  -back to 100 mg nightly per psyc,      #Depression:  -escitalopram 20 mg daily     FN:  - IV 0726   RBC  2.89*  2.77*  2.78*   HGB  7.7*  7.4*  7.2*   HCT  25.6*  24.6*  24.3*   MCV  88.6  88.8  87.4   MCH  26.6  26.7  25.9*   MCHC  30.1*  30.1*  29.6*   RDW  17.1*  16.8*  16.7*   NEPRELIM  5.94  3.55  3.22   WBC  8.0  5.3  4.7   PLT  220.0  187. 0

## 2019-03-03 NOTE — PLAN OF CARE
Blood culture isolated gram positive cocci in clusters. Implemented contact isolation precautions due to concern for MRSA. Once blood cultures are finalized and negative for MRSA or any drug resistant organism, may discontinue isolation precautions.   Serafin

## 2019-03-03 NOTE — PLAN OF CARE
Problem: Patient Centered Care  Goal: Patient preferences are identified and integrated in the patient's plan of care  Interventions:  - What would you like us to know as we care for you?   - Provide timely, complete, and accurate information to patient/fa patient reports new pain  - Anticipate increased pain with activity and pre-medicate as appropriate  Outcome: Progressing      Problem: SAFETY ADULT - FALL  Goal: Free from fall injury  INTERVENTIONS:  - Assess pt frequently for physical needs  - Identify

## 2019-03-03 NOTE — PLAN OF CARE
Pt was Alert and oriented x 2. Rn and 2 PCTs went to reposition / change pt. Pt was explained he was completely wet and had to be changed due to skin breakdown.  Pt consented to change but when started pt stated being verbally abusive, cursing at [facility] st

## 2019-03-04 LAB
ANION GAP SERPL CALC-SCNC: 8 MMOL/L (ref 0–18)
BASOPHILS # BLD AUTO: 0.02 X10(3) UL (ref 0–0.2)
BASOPHILS NFR BLD AUTO: 0.4 %
BUN BLD-MCNC: 36 MG/DL (ref 7–18)
BUN/CREAT SERPL: 28.6 (ref 10–20)
CALCIUM BLD-MCNC: 9.3 MG/DL (ref 8.5–10.1)
CHLORIDE SERPL-SCNC: 109 MMOL/L (ref 98–107)
CO2 SERPL-SCNC: 24 MMOL/L (ref 21–32)
CREAT BLD-MCNC: 1.26 MG/DL (ref 0.7–1.3)
CRYSTALS FLD MICRO: POSITIVE
DEPRECATED RDW RBC AUTO: 54.8 FL (ref 35.1–46.3)
EOSINOPHIL # BLD AUTO: 0.16 X10(3) UL (ref 0–0.7)
EOSINOPHIL NFR BLD AUTO: 3.4 %
ERYTHROCYTE [DISTWIDTH] IN BLOOD BY AUTOMATED COUNT: 17 % (ref 11–15)
GLUCOSE BLD-MCNC: 102 MG/DL (ref 70–99)
HAV IGM SER QL: 2.2 MG/DL (ref 1.6–2.6)
HCT VFR BLD AUTO: 26.8 % (ref 39–53)
HGB BLD-MCNC: 8.2 G/DL (ref 13–17.5)
IMM GRANULOCYTES # BLD AUTO: 0.03 X10(3) UL (ref 0–1)
IMM GRANULOCYTES NFR BLD: 0.6 %
LYMPHOCYTES # BLD AUTO: 0.84 X10(3) UL (ref 1–4)
LYMPHOCYTES NFR BLD AUTO: 17.8 %
MCH RBC QN AUTO: 27.1 PG (ref 26–34)
MCHC RBC AUTO-ENTMCNC: 30.6 G/DL (ref 31–37)
MCV RBC AUTO: 88.4 FL (ref 80–100)
MONOCYTES # BLD AUTO: 0.69 X10(3) UL (ref 0.1–1)
MONOCYTES NFR BLD AUTO: 14.6 %
NEUTROPHILS # BLD AUTO: 2.99 X10 (3) UL (ref 1.5–7.7)
NEUTROPHILS # BLD AUTO: 2.99 X10(3) UL (ref 1.5–7.7)
NEUTROPHILS NFR BLD AUTO: 63.2 %
OSMOLALITY SERPL CALC.SUM OF ELEC: 301 MOSM/KG (ref 275–295)
PLATELET # BLD AUTO: 227 10(3)UL (ref 150–450)
POTASSIUM SERPL-SCNC: 4.3 MMOL/L (ref 3.5–5.1)
RBC # BLD AUTO: 3.03 X10(6)UL (ref 3.8–5.8)
SODIUM SERPL-SCNC: 141 MMOL/L (ref 136–145)
VANCOMYCIN TROUGH SERPL-MCNC: 12 UG/ML (ref 10–20)
WBC # BLD AUTO: 4.7 X10(3) UL (ref 4–11)

## 2019-03-04 PROCEDURE — 83735 ASSAY OF MAGNESIUM: CPT | Performed by: HOSPITALIST

## 2019-03-04 PROCEDURE — 80048 BASIC METABOLIC PNL TOTAL CA: CPT | Performed by: HOSPITALIST

## 2019-03-04 PROCEDURE — 85025 COMPLETE CBC W/AUTO DIFF WBC: CPT | Performed by: HOSPITALIST

## 2019-03-04 PROCEDURE — 97530 THERAPEUTIC ACTIVITIES: CPT

## 2019-03-04 PROCEDURE — 97162 PT EVAL MOD COMPLEX 30 MIN: CPT

## 2019-03-04 PROCEDURE — 80202 ASSAY OF VANCOMYCIN: CPT | Performed by: HOSPITALIST

## 2019-03-04 RX ORDER — COLCHICINE 0.6 MG/1
1.2 TABLET ORAL ONCE
Status: COMPLETED | OUTPATIENT
Start: 2019-03-04 | End: 2019-03-04

## 2019-03-04 RX ORDER — COLCHICINE 0.6 MG/1
0.6 TABLET ORAL DAILY
Status: DISCONTINUED | OUTPATIENT
Start: 2019-03-05 | End: 2019-03-04

## 2019-03-04 NOTE — PAYOR COMM NOTE
--------------  ADMISSION REVIEW     Payor: [employer] MEDICARE ADV PPO  Subscriber #:  [id number]  Authorization Number: [id number]    Admit date: 3/1/19  Admit time: 2350       Admitting Physician: Manju Jackson MD  Attending Physician:  Radha Hart MD • High blood pressure    • High cholesterol    • Hyperuricemia    • Insomnia    • Ischemic cardiomyopathy 2016    Last ECHO 8/17, EF > 50%, concentric LVH   • ARRIOLA (nonalcoholic steatohepatitis) 2016   • Obesity, Class III, BMI 40-49.9 (morbid obesity) (HC Constitutional: AAOx2, well nourished, mildly confused, intermittently groaning in pain  Head: Normocephalic and atraumatic.    Ears: TM's clear b/l  Nose: Nose normal.   Mouth/Throat: MMM, post OP clear with no exudates  Eyes: Conjunctivae and EOM are norm CBC W/ DIFFERENTIAL - Abnormal; Notable for the following components:    RBC 2.89 (*)     HGB 7.7 (*)     HCT 25.6 (*)     MCHC 30.1 (*)     RDW-SD 54.4 (*)     RDW 17.1 (*)     Lymphocyte Absolute 0.95 (*)     All other components within normal limits   C Patient's lactic acid was 2.7 and patient has had no hypotension or fever in the emergency department.   Admission disposition: 3/1/2019 10:17 PM       Critical care time spent on this patient was 35 min for taking history from patient examining patient, me History of Present Illness: Mr. Helane Lundborg is a 70 yo male [address] sig for bladder ca s/p chemo, BPH, CKD 3, CAD, depression, HTN, HLD, SAM on cpap, PVD, chronic low back pain, obesity bmi 36, ARRIOLA, OA with prior R hip replacement with revision on 12/6, then • Personal history of antineoplastic chemotherapy    • Sleep apnea     CPAP        PSH  Past Surgical History:   Procedure Laterality Date   • CAROTID ENDARTERECTOMY Bilateral 2010   • HIP TOTAL ARTHROPLASTY REVISION Right 12/31/2018    Performed by Darylene Gust, Throat: Lips, mucosa, and tongue normal. Teeth and gums normal.   Neck: Supple   Lungs:   Clear to auscultation bilaterally. Normal effort   Chest wall:  No tenderness or deformity.    Heart:  Regular rate and rhythm, S1, S2 normal,     Abdomen:   Soft, non - erythema noted bilateral lower ext on admit, improved today  - knees with pain upon movement but not swollen, no erythema on joint,   - no fever, normal WBC, LA now normal  - on IV vanco, continue for now  - no back pain or signs of radiculopathy  - dopp #Ischemic cardiomyopathy EF 55%  -monitor fluid status closely  -continue BB   - ECHO 12/6/18  EF 50-55%, LVH mild, no sig valvular abnormalities, diastolic dys, mild MR, TR     #SAM on CPAP  -cpap protocol  -encourage use with all sleep     #PAD (peripher Mr. Ana Rosa Arriola is a 70 yo male [address] [employer] sig for bladder ca s/p chemo, BPH, CKD 3, CAD, depression, HTN, HLD, SAM on cpap, PVD, chronic low back pain, obesity bmi 36, ARRIOLA, OA with prior R hip replacement with revision on 12/6, then reinfection with readmit to - dr. Julito Neff to eval in am      Acute blood loss anemia  -multifactorial with CKD, infection, recent surgery with acute blood loss  - intra op blood loss reportedly of 1.3- 2L  - DC hgb 7.9  - hgb 7.7 -> 7.4->7.2  - check iron studies  - continue iron     # DVT Prophy: on eliquis treatment dose  Atrophy: IS   Lines: PIV     Dispo: pending clinical course, [facility] on DC        Questions/concerns were discussed with patient and/or family by bedside.     Thank Lary Self MD     [place] [facility]ist      [place] PLT  220.0  187.0  219.0                  Recent Labs   Lab  03/01/19   1940  03/02/19   0436  03/03/19   0726   GLU  130*  96  115*   BUN  44*  43*  42*   CREATSERUM  1.43*  1.39*  1.30   GFRAA  54*  56*  61   GFRNAA  47*  49*  53*   CA  8.8  8.5  8.9   N Mr. Ana Rosa Arriola is a 70 yo male [address] sig for bladder ca s/p chemo, BPH, CKD 3, CAD, depression, HTN, HLD, SAM on cpap, PVD, chronic low back pain, obesity bmi 36, ARRIOLA, OA with prior R hip replacement with revision on 12/6, then reinfection with readmit to - started on eliquis 10mg BID for 7 days, to transition to 5mg BID on 3/8  - trace edema on exam      # Infected Right Hip   - Hx of Right JOHN with revision on 12/6  - #s/p I&D with poly and head exchange and antibiotic beads on 12/18  - 12/31 s/p explant #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 cataplexy  -provigil was held since prior admit as pt was having insomnia while hospitalized and at facility      #Chronic bilateral low back pain with bilateral sciatica/chronic opiate use  -Tylenol and tramadol for pain  - wea MSK: left knee tender to palpation, erythema of bilateral lower ext improved  Skin: no rashes or lesions  Neuro: AO*3, motor intact, no sensory deficits  Psyc: appropriate mood and affect        Data Review:       Labs:            Recent Labs   Lab  03/02/ Date Action Dose Route User    3/3/2019 1832 Given 650 mg Oral Deneen Parsons RN      allopurinol (ZYLOPRIM) tab 300 mg     Date Action Dose Route User    3/4/2019 0945 Given 300 mg Oral Lanre Estrada RN      apixaban Dorie Dsouza) tab 10 mg     Date Actio 3/4/2019 0944 Given 20 mg Oral Anjana Ruano RN      QUEtiapine Fumarate (SEROQUEL) tab 100 mg     Date Action Dose Route User    3/3/2019 2022 Given 100 mg Oral Gage Smith RN      [facility]) tab 17.2 mg     Date Action Dose Route User    3/3/201

## 2019-03-04 NOTE — PROGRESS NOTES
[facility] AND [facility] Infectious Disease  Progress Note    Michael Alcantar Patient Status:  Inpatient    1940 MRN [id number]   Location [facility] 5SW/SE Attending Les Lozano MD   Hosp Day # 3 PCP Mariel Tuttle MD     Subjective:  Tony started  - TTE negative  - IV vancomycin ongoing and will plan a full treatment course    2.  Recent R JOHN site infection - original elective surgery on 12/06  - Secondary to erythema/drainage s/p OR washout 12/18  - OR cultures with enterobacter, 6 weeks

## 2019-03-04 NOTE — CONSULTS
[facility]    Report of Consultation    Larry No Patient Status:  Inpatient    1940 MRN [id number]   Location [facility] 5SW/SE Attending Yanira Hernandez MD   Hosp Day # 3 PCP Ginny Escalante MD     Date of Admission: antineoplastic chemotherapy    • Sleep apnea     CPAP       Past Surgical History  Past Surgical History:   Procedure Laterality Date   • CAROTID ENDARTERECTOMY Bilateral 2010   • HIP TOTAL ARTHROPLASTY REVISION Right 12/31/2018    Performed by Etelvina Gates QUEtiapine Fumarate (SEROQUEL) tab 100 mg 100 mg Oral Nightly   Metoprolol Succinate ER (Toprol XL) 24 hr tab 25 mg 25 mg Oral Daily   mupirocin (BACTROBAN) 2% nasal ointment OINT 1 Application 1 Application Each Nare BID   docusate sodium (COLACE) cap 1 sulfate 325 (65 FE) MG Oral Tab EC Take 1 tablet (325 mg total) by mouth 2 (two) times daily with meals.    omeprazole 20 MG Oral Capsule Delayed Release Take 40 mg by mouth every morning before breakfast.   QUEtiapine Fumarate 100 MG Oral Tab Take 1 tablet and thigh, there is no drainage, it is slightly red, it is nontender to palpation, he has mild discomfort in his hip with passive range of motion, he has good strength and stability the ankle joint plantar flexion dorsiflexion with warm pink toes and palpa monosodium urate crystals consistent with gout. I attempted to aspirate his right knee and I cannot get any fluid out. I have no further treatment recommendations for the left knee from an orthopedic standpoint. He can be managed medically for that.   Hi

## 2019-03-04 NOTE — PLAN OF CARE
Problem: Patient Centered Care  Goal: Patient preferences are identified and integrated in the patient's plan of care  Interventions:  - What would you like us to know as we care for you?   - Provide timely, complete, and accurate information to patient/fa physical deficits and behaviors that affect risk of falls.   - [place] fall precautions as indicated by assessment.  - Educate pt/family on patient safety including physical limitations  - Instruct pt to call for assistance with activity based on assessme

## 2019-03-04 NOTE — PLAN OF CARE
Problem: Patient Centered Care  Goal: Patient preferences are identified and integrated in the patient's plan of care  Interventions:  - What would you like us to know as we care for you?   - Provide timely, complete, and accurate information to patient/fa reports new pain  - Anticipate increased pain with activity and pre-medicate as appropriate  Outcome: Progressing      Problem: SAFETY ADULT - FALL  Goal: Free from fall injury  INTERVENTIONS:  - Assess pt frequently for physical needs  - Identify cognitiv

## 2019-03-04 NOTE — PHYSICAL THERAPY NOTE
PHYSICAL THERAPY EVALUATION - INPATIENT     Room Number: 563/563-A  Evaluation Date: 3/4/2019  Type of Evaluation: Initial   Physician Order: PT Eval and Treat    Presenting Problem: Cellulitis of LE  Reason for Therapy: Mobility Dysfunction and Discharge supports that patients with this level of impairment may benefit from LTAC, however patient making progress in rehab prior to admission and would benefit from continued sub-acute rehab.     Patient will benefit from continued IP PT services to address these ARTHROPLASTY REVISION Right 12/18/2018    Performed by Dilshad Montenegro DO at [address] [place] [address] MAIN OR   • [address] Right 12/6/2018    Performed by Dilshad Montenegro DO at [address] [place] [address] OR   • [address]      fusion   • TOTAL HIP (e.g., wheelchair, bedside commode, etc.): Unable   -   Moving from lying on back to sitting on the side of the bed?: A Lot   How much help from another person does the patient currently need. ..   -   Moving to and from a bed to a chair (including a wheelc

## 2019-03-04 NOTE — PLAN OF CARE
PAIN - ADULT    • Verbalizes/displays adequate comfort level or patient's stated pain goal Not Progressing          Patient Centered Care    • Patient preferences are identified and integrated in the patient's plan of care Progressing        SAFETY ADULT -

## 2019-03-04 NOTE — PROGRESS NOTES
Chelsi Austin is a 69year old male. Patient presents with:  Abnormal Labs      HPI:    Worse left knee pain s/p needle aspiration via ortho service    REVIEW OF SYSTEMS:   A comprehensive 11 point review of systems was completed.   Pertinent positives m)   Wt 214 lb 3.2 oz (97.2 kg)   SpO2 97%   BMI 34.57 kg/m²   GENERAL:  Awake, alert, oriented x3. Non-tox, non-septic and in NAD. HEENT:  Normocephalic, Nonicteric,Conjunctiva pale  Oropharynx clear, trachea ML.   NECK:  Supple, no masses, no lymphadenop Preliminary result    BLOOD CULTURE Preliminary result    BLOOD CULTURE Preliminary result    BLOOD CULTURE Preliminary result    BODY FLUID CULT,AEROBIC AND ANAEROBIC Preliminary result           Results for orders placed or performed during the hospital GFR, -American 56 (L) >=60   PROTHROMBIN TIME (PT)   Result Value Ref Range    PT 23.9 (H) 11.8 - 14.5 seconds    INR 2.11 (H) 0.90 - 1.20   LACTIC ACID 3 HR POST POSITIVE   Result Value Ref Range    Lactic Acid 0.8 0.4 - 2.0 mmol/L   FERRITIN   Res Range    Hold Blue Auto Resulted    RAINBOW DRAW LAVENDER   Result Value Ref Range    Hold Lavender Auto Resulted    RAINBOW DRAW LIGHT GREEN   Result Value Ref Range    Hold Lt Green Auto Resulted     HOLD     BLOOD CULTURE   Result Value Ref Range    Blo g/dL    HCT 24.6 (L) 39.0 - 53.0 %    MCV 88.8 80.0 - 100.0 fL    MCH 26.7 26.0 - 34.0 pg    MCHC 30.1 (L) 31.0 - 37.0 g/dL    RDW-SD 54.4 (H) 35.1 - 46.3 fL    RDW 16.8 (H) 11.0 - 15.0 %    .0 150.0 - 450.0 10(3)uL    Neutrophil Absolute Prelim 3.5

## 2019-03-04 NOTE — PROGRESS NOTES
[facility] Hospitalist Progress Note     CC: Hospital Follow up    PCP: Whit Marin MD       Assessment/Plan:     Principal Problem:    Cellulitis of lower extremity, unspecified laterality  Active Problems:    Cellulitis of lower extremity    Acute deep v pharmacy, given concomitant fluc use, they recommended no further dose until 3 days, then can repeat if needed  - will continue allopurinol as he was on this prior  - reassess in am     Bilateral lower ext DVT  - US done at [facility] on 2/28 with DVT of right fe - ECHO 12/6/18  EF 50-55%, LVH mild, no sig valvular abnormalities, diastolic dys, mild MR, TR     #SAM on CPAP  -cpap protocol  -encourage use with all sleep     #PAD (peripheral artery disease) ([address]  -resumed aspirin  -plavix held per surg     #Primar acute distress,    Heent: NC AT,    Pulm: Lungs clear, normal respiratory effort  CV: Heart with regular rate and rhythm,   Abd: Abdomen soft, nontender, nondistended,   MSK: left knee tender to palpation, erythema of bilateral lower ext improved  Skin: no Metoclopramide HCl

## 2019-03-04 NOTE — CM/SW NOTE
SW confirmed w/ pt's son that pt is from [facility]. Son states that him and his sister would like for pt to return back to SNF, but pt has stated some hesitance and may possibly want to go home. PT to evaluate pt. SW to follow up when appropriate.

## 2019-03-05 ENCOUNTER — APPOINTMENT (OUTPATIENT)
Dept: PICC SERVICES | Facility: HOSPITAL | Age: 79
DRG: 920 | End: 2019-03-05
Attending: INTERNAL MEDICINE
Payer: MEDICARE

## 2019-03-05 VITALS
RESPIRATION RATE: 18 BRPM | WEIGHT: 213 LBS | OXYGEN SATURATION: 100 % | TEMPERATURE: 98 F | DIASTOLIC BLOOD PRESSURE: 69 MMHG | HEIGHT: 66 IN | BODY MASS INDEX: 34.23 KG/M2 | HEART RATE: 79 BPM | SYSTOLIC BLOOD PRESSURE: 134 MMHG

## 2019-03-05 LAB
ANION GAP SERPL CALC-SCNC: 11 MMOL/L (ref 0–18)
ANTIBODY SCREEN: NEGATIVE
BASOPHILS # BLD AUTO: 0.02 X10(3) UL (ref 0–0.2)
BASOPHILS NFR BLD AUTO: 0.4 %
BUN BLD-MCNC: 38 MG/DL (ref 7–18)
BUN/CREAT SERPL: 29.9 (ref 10–20)
CALCIUM BLD-MCNC: 8.9 MG/DL (ref 8.5–10.1)
CHLORIDE SERPL-SCNC: 107 MMOL/L (ref 98–107)
CO2 SERPL-SCNC: 22 MMOL/L (ref 21–32)
CREAT BLD-MCNC: 1.27 MG/DL (ref 0.7–1.3)
DEPRECATED RDW RBC AUTO: 52.2 FL (ref 35.1–46.3)
EOSINOPHIL # BLD AUTO: 0.16 X10(3) UL (ref 0–0.7)
EOSINOPHIL NFR BLD AUTO: 3.3 %
ERYTHROCYTE [DISTWIDTH] IN BLOOD BY AUTOMATED COUNT: 17 % (ref 11–15)
GLUCOSE BLD-MCNC: 88 MG/DL (ref 70–99)
HAV IGM SER QL: 2.2 MG/DL (ref 1.6–2.6)
HCT VFR BLD AUTO: 23.2 % (ref 39–53)
HCT VFR BLD AUTO: 24.5 % (ref 39–53)
HGB BLD-MCNC: 7.2 G/DL (ref 13–17.5)
HGB BLD-MCNC: 7.3 G/DL (ref 13–17.5)
HGB BLD-MCNC: 8.1 G/DL (ref 13–17.5)
IMM GRANULOCYTES # BLD AUTO: 0.04 X10(3) UL (ref 0–1)
IMM GRANULOCYTES NFR BLD: 0.8 %
LYMPHOCYTES # BLD AUTO: 1.21 X10(3) UL (ref 1–4)
LYMPHOCYTES NFR BLD AUTO: 25.3 %
MCH RBC QN AUTO: 26.9 PG (ref 26–34)
MCHC RBC AUTO-ENTMCNC: 31.5 G/DL (ref 31–37)
MCV RBC AUTO: 85.6 FL (ref 80–100)
MONOCYTES # BLD AUTO: 0.64 X10(3) UL (ref 0.1–1)
MONOCYTES NFR BLD AUTO: 13.4 %
NEUTROPHILS # BLD AUTO: 2.72 X10 (3) UL (ref 1.5–7.7)
NEUTROPHILS # BLD AUTO: 2.72 X10(3) UL (ref 1.5–7.7)
NEUTROPHILS NFR BLD AUTO: 56.8 %
OSMOLALITY SERPL CALC.SUM OF ELEC: 298 MOSM/KG (ref 275–295)
PLATELET # BLD AUTO: 206 10(3)UL (ref 150–450)
POTASSIUM SERPL-SCNC: 4.3 MMOL/L (ref 3.5–5.1)
RBC # BLD AUTO: 2.71 X10(6)UL (ref 3.8–5.8)
RH BLOOD TYPE: POSITIVE
SODIUM SERPL-SCNC: 140 MMOL/L (ref 136–145)
WBC # BLD AUTO: 4.8 X10(3) UL (ref 4–11)

## 2019-03-05 PROCEDURE — 36430 TRANSFUSION BLD/BLD COMPNT: CPT

## 2019-03-05 PROCEDURE — 36569 INSJ PICC 5 YR+ W/O IMAGING: CPT

## 2019-03-05 PROCEDURE — 30233N1 TRANSFUSION OF NONAUTOLOGOUS RED BLOOD CELLS INTO PERIPHERAL VEIN, PERCUTANEOUS APPROACH: ICD-10-PCS | Performed by: HOSPITALIST

## 2019-03-05 PROCEDURE — 85025 COMPLETE CBC W/AUTO DIFF WBC: CPT | Performed by: HOSPITALIST

## 2019-03-05 PROCEDURE — 85014 HEMATOCRIT: CPT | Performed by: HOSPITALIST

## 2019-03-05 PROCEDURE — 02HV33Z INSERTION OF INFUSION DEVICE INTO SUPERIOR VENA CAVA, PERCUTANEOUS APPROACH: ICD-10-PCS | Performed by: INTERNAL MEDICINE

## 2019-03-05 PROCEDURE — 86901 BLOOD TYPING SEROLOGIC RH(D): CPT | Performed by: HOSPITALIST

## 2019-03-05 PROCEDURE — 76937 US GUIDE VASCULAR ACCESS: CPT

## 2019-03-05 PROCEDURE — 85018 HEMOGLOBIN: CPT | Performed by: HOSPITALIST

## 2019-03-05 PROCEDURE — 97535 SELF CARE MNGMENT TRAINING: CPT

## 2019-03-05 PROCEDURE — 86850 RBC ANTIBODY SCREEN: CPT | Performed by: HOSPITALIST

## 2019-03-05 PROCEDURE — 83735 ASSAY OF MAGNESIUM: CPT | Performed by: HOSPITALIST

## 2019-03-05 PROCEDURE — 97166 OT EVAL MOD COMPLEX 45 MIN: CPT

## 2019-03-05 PROCEDURE — 86920 COMPATIBILITY TEST SPIN: CPT

## 2019-03-05 PROCEDURE — 80048 BASIC METABOLIC PNL TOTAL CA: CPT | Performed by: HOSPITALIST

## 2019-03-05 PROCEDURE — 97530 THERAPEUTIC ACTIVITIES: CPT

## 2019-03-05 PROCEDURE — 86900 BLOOD TYPING SEROLOGIC ABO: CPT | Performed by: HOSPITALIST

## 2019-03-05 RX ORDER — SODIUM CHLORIDE 0.9 % (FLUSH) 0.9 %
10 SYRINGE (ML) INJECTION AS NEEDED
Status: DISCONTINUED | OUTPATIENT
Start: 2019-03-05 | End: 2019-03-05

## 2019-03-05 RX ORDER — LIDOCAINE HYDROCHLORIDE 10 MG/ML
0.5 INJECTION, SOLUTION INFILTRATION; PERINEURAL ONCE AS NEEDED
Status: DISCONTINUED | OUTPATIENT
Start: 2019-03-05 | End: 2019-03-05

## 2019-03-05 RX ORDER — TRAMADOL HYDROCHLORIDE 50 MG/1
50 TABLET ORAL EVERY 4 HOURS PRN
Qty: 10 TABLET | Refills: 0 | Status: ON HOLD | OUTPATIENT
Start: 2019-03-05 | End: 2019-01-01

## 2019-03-05 RX ORDER — SODIUM CHLORIDE 9 MG/ML
INJECTION, SOLUTION INTRAVENOUS ONCE
Status: COMPLETED | OUTPATIENT
Start: 2019-03-05 | End: 2019-03-05

## 2019-03-05 NOTE — PHYSICAL THERAPY NOTE
PHYSICAL THERAPY TREATMENT NOTE - INPATIENT     Room Number: 563/563-A       Presenting Problem: Cellulitis of LE    Problem List  Principal Problem:    Cellulitis of lower extremity, unspecified laterality  Active Problems:    Cellulitis of lower extremit functional outcomes. DISCHARGE RECOMMENDATIONS  PT Discharge Recommendations: Sub-acute rehabilitation     PLAN  PT Treatment Plan: Bed mobility; Body mechanics; Endurance; Energy conservation;Patient education; Family education;Strengthening;Transfer train reach;RN aware of session/findings; Alarm set; With [facility] staff    CURRENT GOALS   Goals to be met by: 3/18/19  Patient Goal Patient's self-stated goal is: return to PLOF   Goal #1 Patient is able to demonstrate supine - sit EOB @ level: Min A x 1      Goal #1

## 2019-03-05 NOTE — PLAN OF CARE
Problem: Patient Centered Care  Goal: Patient preferences are identified and integrated in the patient's plan of care  Interventions:  - What would you like us to know as we care for you?   - Provide timely, complete, and accurate information to patient/fa needs  - Identify cognitive and physical deficits and behaviors that affect risk of falls.   - [place] fall precautions as indicated by assessment.  - Educate pt/family on patient safety including physical limitations  - Instruct pt to call for assistance interventions   Outcome: Adequate for Discharge

## 2019-03-05 NOTE — PLAN OF CARE
Problem: Patient Centered Care  Goal: Patient preferences are identified and integrated in the patient's plan of care  Interventions:  - What would you like us to know as we care for you?   - Provide timely, complete, and accurate information to patient/fa physical deficits and behaviors that affect risk of falls.   - [place] fall precautions as indicated by assessment.  - Educate pt/family on patient safety including physical limitations  - Instruct pt to call for assistance with activity based on assessme CPAP in place throughout night. Patient resting comfortably in bed. PRN tylenol given for pain. Incontinence care provided as needed. Call light within reach. Patient able to make needs known. Plan for possible PICC.

## 2019-03-05 NOTE — PROGRESS NOTES
[facility] dosing service    Follow-up Pharmacokinetic Consult for Vancomycin Dosing     Tressa Wall is a 69year old male who is being treated for bacteremia and cellulitis. Patient is on day 4 of Vancomycin 1 gm IV Q 24 hours.   Goal trough is 15 cytocentrifuged smear. N/A   2. BLOOD CULTURE     Status: None (Preliminary result)    Collection Time: 03/02/19  2:20 PM   Result Value Ref Range    Blood Culture Result No Growth 2 Days N/A           Based on the above:    1.  Increase Vancomycin at 1.25

## 2019-03-05 NOTE — DISCHARGE SUMMARY
General Medicine Discharge Summary     Patient ID:  Camila Bernheim  69year old  4/4/1940    Admit date: 3/1/2019    Discharge date and time: 3/5/19     Attending Physician: Roxanne Estrada MD     Consults: IP CONSULT TO HOSPITALIST  IP CONSULT TO INFECTIOUS on 1/23 with 6 weeks of IV meropenem and po fluc (completed 2/12) followed by suppressive ab's on cipro and fluc, with recent (2/28) diagnosis of bilateral lower ext DVT's started on eliquis, who now presents with worsening lower ext pain and redness.   Garrett Lozano bacteremia  - 1/2 BC with MRSA, 2nd pending, repeat cultures ngtd  - on IV vancomycin, needs 4 weeks, picc line placed per ID  - not septic, echo neg for endocarditis  - ortho evaluated wound without evidence of infection  - ID FU  - likely due to cellulit ([address]  -baseline creat 1.6,  -monitor cre: stable  -monitor UOP closely     #Encephalopathy  - AO 2-3 now  - no signs of acute infection  - only on tylenol and occasionally on tramadol for pain  - prior hs of severe delirium with prior hospitalizations    take these medications    fluconazole 200 MG Tabs  Commonly known as:  DIFLUCAN  Take 1 tablet (200 mg total) by mouth daily.  Stop on 2/12  What changed:    · how much to take  · additional instructions        CONTINUE taking these medications    [facility]    Bring a paper prescription for each of these medications  · sodium chloride 0.9% SOLN 500 mL with Vancomycin HCl 500 MG SOLR 1,250 mg  · traMADol HCl 50 MG Tabs         FU  Follow-up Information     Sukhi Joseph MD. Schedule an appointme

## 2019-03-05 NOTE — CM/SW NOTE
SW received updated PT/OT notes. SW started insurance auth for pt to return to [facility] due to needing 4wks abx. Case # [id number]    12:10PM: Auth received - [id number]    3:00PM: Pt able to d/c today.  RN requesting 6p d/c time due to pt receiving blo

## 2019-03-05 NOTE — PROGRESS NOTES
Vascular Access Note  Inserted by Irma Alicia RN  Vascular Access Screening:   Allergies to Lidocaine: no  Allergies to Latex: no  Presence of Pacemaker/Defibrillator: No  Mastectomy with Lymph Node Dissection: No  AV Fistula / AV Graft: No  Dialysis Catheter:

## 2019-03-05 NOTE — OCCUPATIONAL THERAPY NOTE
OCCUPATIONAL THERAPY EVALUATION - INPATIENT     Room Number: 563/563-A  Evaluation Date: 3/5/2019  Type of Evaluation: Initial  Presenting Problem: (cellulitis of lower extremity )    Physician Order: IP Consult to Occupational Therapy  Reason for Therapy: with this level of impairment may benefit from sub-acute rehab. Prior to JOHN, pt was living alone and ambulating, completing self care with independence. Pt will benefit from rehab to maximize indep with self care and fx mobility.      DISCHARGE RECOMMENDAT Patricia Stanford DO at [address] [facility] MAIN OR   • HIP TOTAL ARTHROPLASTY REVISION Right 12/18/2018    Performed by Patricia Stanford DO at [address] [facility] OR   • [address] Right 12/6/2018    Performed by Patricia Stanford DO at [address] [place] [address] patient currently need…  -   Putting on and taking off regular lower body clothing?: Total  -   Bathing (including washing, rinsing, drying)?: A Lot  -   Toileting, which includes using toilet, bedpan or urinal? : Total  -   Putting on and taking off regul

## 2019-03-05 NOTE — PROGRESS NOTES
[facility] AND [facility] Infectious Disease  Progress Note    Janett Contreras Patient Status:  Inpatient    1940 MRN [id number]   Location [facility] 5SW/SE Attending Maximo Elias MD   Hosp Day # 4 PCP Smita Still MD     Subjective:  Nancy Chimera septic joint, but tap c/w gout with colchicine now started  - TTE negative  - IV vancomycin ongoing and will plan a full treatment course  - OK for PICC today     2.  Recent R JOHN site infection - original elective surgery on 12/06  - Secondary to erythema

## 2019-03-06 LAB — BLOOD TYPE BARCODE: 5100

## 2019-03-06 NOTE — PAYOR COMM NOTE
--------------  DISCHARGE REVIEW    Payor: [employer] MEDICARE ADV PPO  Subscriber #:  [id number]  Authorization Number: [id number]    Admit date: 3/1/19  Admit time:  2350  Discharge Date: 3/5/2019  7:19 PM     Admitting Physician: Monster Lopez MD  Atte weeks  Please change eliquis from 10mg BID to 5mg BID on 3/8/19    Exam  Gen: No acute distress  Pulm: Lungs clear, normal respiratory effort  CV: Heart with regular rate and rhythm  Abd: Abdomen soft,       HPI: History of Present Illness: Mr. Kaci Ball is a suppressive ab's on cipro and fluc, with recent (2/28) diagnosis of bilateral lower ext DVT's started on eliquis, who now presents with worsening lower ext pain and redness, noted to have bilateral cellulitis, then dev MRSA bacteremia likely due to celluli 12/18  - 12/31 s/p explant of right JOHN, with placement of antibiotic spacer with Dr. Robert Demarco  - ID followed last admit,   - completed 6 weeks of merop and fluc  - changed to oral cipro and fluc for suppression  - Ortho and ID consulted  - dr. Meryl Schwartz in 2 bilateral sciatica/chronic opiate use  -Tylenol and tramadol for pain  - wean as able        #Insomnia  -seroquel started per psych last admission with delerium/AMS  -back to 100 mg nightly per psyc,            Operative Procedures:      Imaging:         D UNIT/GM Crea  Commonly known as:  MYCOSTATIN  Apply 1 Application topically 2 (two) times daily.      omeprazole 20 MG Cpdr  Commonly known as:  PRILOSEC     PEG 3350 Pack  Commonly known as:  MIRALAX     Pravastatin Sodium 20 MG Tabs  Commonly known as:  P in 2 weeks  Please change eliquis from 10mg BID to 5mg BID on 3/8/19                Total Time Coordinating Care: Greater than 30 minutes    Patient had opportunity to ask questions and state understand and agree with therapeutic plan as outlined    Thank

## 2019-03-22 PROBLEM — S72.001A CLOSED RIGHT HIP FRACTURE (HCC): Status: ACTIVE | Noted: 2019-01-01

## 2019-03-22 NOTE — H&P
[address] [facility] Patient Status:  Inpatient    1940 MRN [id number]   Location [facility] 4W/SW/SE Attending Dc Granado MD   Hosp Day # 0 PCP Smita Still MD     Date:  3/22/2019 vascular disease ([facility] [address] 2016   • Personal history of antineoplastic chemotherapy    • Sleep apnea     CPAP     Past Surgical History:   Procedure Laterality Date   • CAROTID ENDARTERECTOMY Bilateral 2010   • HIP TOTAL ARTHROPLASTY REVISION Right 12/31/2018 tablet (325 mg total) by mouth 2 (two) times daily with meals. omeprazole 20 MG Oral Capsule Delayed Release Take 40 mg by mouth every morning before breakfast.   QUEtiapine Fumarate 100 MG Oral Tab Take 1 tablet (100 mg total) by mouth nightly.    Senna hydrated  PSYCHIATRIC: Calm and cooperative   HEENT:  Head was atraumatic and normocephalic. Eyes:  Extraocular muscles were intact. Sclera was anicteric. Pupils were equally reactive to light. Ears: There were no lesions.   Nose:  No lesions were note osteomyelitis  - continue current meds     DVT proph- heparin    Full code     70 minutes spent on this admission - examining patient, obtaining history, reviewing previous medical records, going over test results/imaging and discussing plan of care.  All q

## 2019-03-22 NOTE — PROGRESS NOTES
[address] [facility] dosing service    Initial Pharmacokinetic Consult for Vancomycin Dosing     Cas Killian is a 69year old male admitted on 3/22/19 who is being treated for right hip infection & MRSA + .   Pharmacy has been asked to dose Vancomycin by Dr. Prasanth Lockwood pharmacokinetics. 2.  Pharmacy ordered Vancomycin trough level(s) prior to 4th dose. Goal trough level 15-20 ug/mL. 3.  Pharmacy will need BUN/Scr daily while on Vancomycin to assess renal function.     4.  Pharmacy will follow and monitor renal fun

## 2019-03-23 NOTE — PHYSICAL THERAPY NOTE
PHYSICAL THERAPY EVALUATION - INPATIENT     Room Number: 417/417-A  Evaluation Date: 3/23/2019  Type of Evaluation: Initial   Physician Order: PT Eval and Treat    Presenting Problem: New L periprosthetic fracture(extensive medical history,please review e greater trochanter. \"      . Patient was seen with RN permission and was received in supine. Patient was hard of hearing. Patient not very enthusiastic about therapy. Reported that he \"didn't do anything\" at rehab\".  Per patient he was using lift and so atherosclerosis    • DDD (degenerative disc disease), lumbar    • Depression    • Eczema    • High blood pressure    • High cholesterol    • Hyperuricemia    • Insomnia    • Ischemic cardiomyopathy 2016    Last ECHO 8/17, EF > 50%, concentric LVH   • ARRIOLA tested  Dynamic Sitting: Not tested  Static Standing: Not tested  Dynamic Standing: Not tested    ADDITIONAL TESTS                                    NEUROLOGICAL FINDINGS                      ACTIVITY TOLERANCE                         O2 WALK

## 2019-03-23 NOTE — PROGRESS NOTES
[facility] Hospitalist Progress Note     CC: Hospital Follow up    PCP: Tamera King MD     Assessment/Plan:     Active Problems:    Closed right hip fracture [facility])       Mr. Dianelys Edge is a 68 yo male [employer] sig for bladder ca s/p chemo, BPH, CKD 3, CAD, dep continue    #Recent MRSA bacteremia  - on prior admission 2/2 cellulitis, seen by ID, planned for 4 weeks of IV vancomycin      # Hx of Infected Right Hip   - Hx of Right JOHN with revision on 12/6  - s/p I&D with poly and head exchange and antibiotic beads spent counseling patient face to face       Subjective:     Still having uncontrolled pain, mostly with movement. Per patient, Dr. Shalini Atkins came by yesterday, looked at films, non operative management, WBAT.      OBJECTIVE:    Blood pressure 126/51, pulse 83, apixaban  10 mg Oral BID   • aspirin  81 mg Oral Daily   • bacitracin   Topical Daily   • Ciprofloxacin HCl  500 mg Oral Daily   • Trolamine Salicylate   Topical QID   • docusate sodium  100 mg Oral BID   • escitalopram  10 mg Oral Daily   • ferrous sulfat

## 2019-03-23 NOTE — OCCUPATIONAL THERAPY NOTE
OCCUPATIONAL THERAPY EVALUATION - INPATIENT      Room Number: 417/417-A  Evaluation Date: 3/23/2019  Type of Evaluation: Initial  Presenting Problem: (New L periprosthetic fx)    Physician Order: IP Consult to Occupational Therapy  Reason for Therapy: ADL/ reporting 6/10 B hip pain, agreeable to therapy, however unable to tolerate much. Pt yelling out in pain with any minimal movement of BLEs. Pt was not able to tolerate rolling in bed or movement of BLEs.  Pt reports he was not doing much in rehab and was li kidney disease) stage 3, GFR 30-59 ml/min ([facility])    • Coronary atherosclerosis    • DDD (degenerative disc disease), lumbar    • Depression    • Eczema    • High blood pressure    • High cholesterol    • Hyperuricemia    • Insomnia    • Ischemic cardiomyopa pain with attempts at movement)  Location: (B hips )  Management Techniques: Relaxation;Repositioning      COGNITION  Orientation Level:  A&Ox2-3  Following Commands:  follows one step commands with repetition    RANGE OF MOTION   Upper extremity ROM is wi Unable to state his goals due to increased pain at this time      Patient will tolerate sitting EOB with min. A for 5 minutes in preparation for ADLs. Comment:     Patient will complete supine<>sit with max. A x2.    Comment:     Patient will complete groo

## 2019-03-23 NOTE — PROGRESS NOTES
[facility]    CDI Prediction Tool Protocol (Vancomycin Initiated)    OVP (oral vancomycin prophylaxis) 125 mg PO Daily is being started in this patient based on a score of 14.       Score Breakdown:  High risk antibiotic use (5 points)  Hospital

## 2019-03-23 NOTE — PROGRESS NOTES
[facility] Pharmacy Note:  Renal Dose Adjustment for Metoclopramide (REGLAN)    Cas Killian has been prescribed Metoclopramide (REGLAN) 10 mg every every 8 hours as needed. Estimated Creatinine Clearance: 39.9 mL/min (A) (based on SCr of 1.4 mg/dL (H)).

## 2019-03-24 NOTE — PLAN OF CARE
HEMATOLOGIC - ADULT    • Free from bleeding injury Progressing        eliquis for dvt prophylaxis. Scds in place. MUSCULOSKELETAL - ADULT    • Return mobility to safest level of function Progressing        Jose Alfredo Disla is up with the lift.  He can be WBAT to the

## 2019-03-24 NOTE — PROGRESS NOTES
First step overlay mattress delivered and on patient's bed. Used overhead lift to get patient up OOB to put the overlay in place.

## 2019-03-24 NOTE — PROGRESS NOTES
Patient is refusing heels to be elevated, states that it causes pain. Pain medication provided and patient still refusing towel under heels. Education provided on importance of elevation to heels and patient states understanding.  Wound on consult to see pa

## 2019-03-24 NOTE — PLAN OF CARE
DISCHARGE PLANNING    • Discharge to home or other facility with appropriate resources Progressing        PAIN - ADULT    • Verbalizes/displays adequate comfort level or patient's stated pain goal Progressing      Pain being managed with tylenol #3 and tra

## 2019-03-24 NOTE — PLAN OF CARE
Yesterday, 2 discs were taken to radiology to be uploaded. This RN called radiology for the status of the upload. They stated they are unable to do it till Monday since a radiologist is not available till then. Dr. Sarah Dodd updated.

## 2019-03-24 NOTE — PROGRESS NOTES
[facility] Hospitalist Progress Note     CC: Hospital Follow up    PCP: Chery Grace MD     Assessment/Plan:     Active Problems:    Closed right hip fracture [facility])       Mr. Demond Lopez is a 70 yo male [address] [employer] sig for bladder ca s/p chemo, BPH, CKD 3, CAD, dep MRSA bacteremia  - on prior admission 2/2 cellulitis, seen by ID, planned for 4 weeks of IV vancomycin      # Hx of Infected Right Hip   - Hx of Right JOHN with revision on 12/6  - s/p I&D with poly and head exchange and antibiotic beads on 12/18  - 12/31 s movement. OBJECTIVE:    Blood pressure 126/42, pulse 81, temperature 97.6 °F (36.4 °C), temperature source Oral, resp. rate 20, weight 233 lb 11 oz (106 kg), SpO2 100 %.     Temp:  [97.6 °F (36.4 °C)-99 °F (37.2 °C)] 97.6 °F (36.4 °C)  Pulse:  [81-88] alteplase (CATHFLO) IV push 2 mg to DECLOT line  2 mg Intravenous Once   • alteplase (CATHFLO) IV push 2 mg to DECLOT line  2 mg Intravenous Once   • vancomycin  1,500 mg Intravenous Q24H   • Vancomycin HCl  125 mg Oral Daily   • apixaban  5 mg Oral BID

## 2019-03-24 NOTE — CONSULTS
[facility]    Report of Consultation    Ty Counts Patient Status:  Inpatient    1940 MRN [id number]   Location [facility] 4W/SW/SE Attending Aidee Gomes MD   Hosp Day # 2 PCP Licha Kelly MD     Date of A chemotherapy    • Sleep apnea     CPAP       Past Surgical History  Past Surgical History:   Procedure Laterality Date   • CAROTID ENDARTERECTOMY Bilateral 2010   • HIP TOTAL ARTHROPLASTY REVISION Right 12/31/2018    Performed by Dilshad Montenegro DO at [address] [place] [address] tab 500 mg 500 mg Oral Daily   Trolamine Salicylate 10 % cream  Topical QID   docusate sodium (COLACE) cap 100 mg 100 mg Oral BID   escitalopram (LEXAPRO) tablet 10 mg 10 mg Oral Daily   ferrous sulfate EC tab 325 mg 325 mg Oral BID with meals   fluconazol daily. fluconazole 200 MG Oral Tab Take 1 tablet (200 mg total) by mouth daily. Stop on 2/12 (Patient taking differently: Take 100 mg by mouth daily.  )   nystatin 959217 UNIT/GM External Cream Apply 1 Application topically 2 (two) times daily.    ferrous Positive for falls and joint pain. Skin: Negative. Neurological: Negative for dizziness. Psychiatric/Behavioral: Negative for depression.        Physical Exam:   Blood pressure 126/42, pulse 81, temperature 97.6 °F (36.4 °C), temperature source Oral, 01/22/2019         Imaging:       CT scan of the left hip from March 24, 2019 was reviewed and interpreted by me. There is a nondisplaced fracture of the greater trochanter. The arthroplasty stem is stable.     Impression:     Nondisplaced left greater tr

## 2019-03-25 NOTE — WOUND PROGRESS NOTE
WOUND CARE NOTE      PLAN   Recommendations:  Dr. Rodriguez Lovechantal currently on consult  Turn schedules  Specialty bed: 1st step air mattress in place  Heels elevated using pillows, heel wedge or heel boots to offload heels  To prevent sliding: decrease head of bed

## 2019-03-25 NOTE — CM/SW NOTE
CAN met with the patient at bedside to discuss DC plans. Patient was admitted from [facility], before that, he was at [facility] for rehab. Patient is agreeable with going back there.     Records sent to [facility], CAN called Jasmyn [phone]

## 2019-03-25 NOTE — PHYSICAL THERAPY NOTE
PHYSICAL THERAPY RE-EVALUATION - INPATIENT     Room Number: 417/417-A  Evaluation Date: 3/25/2019  Type of Evaluation: Re-evaluation   Physician Order: PT Eval and Treat    Presenting Problem: New L periprosthetic fracture(extensive medical history,please pre-admission status. The patient's Approx Degree of Impairment: 86.62% has been calculated based on documentation in the [facility] '6 clicks' Inpatient Basic Mobility Short Form.   Research supports that patients with this level of impairment may benefit from admit. \"Hospital course complicated by anemia requiring transfusion, LIN, and delirium, DC'ed to SNF on 1/23 with 6 weeks of IV meropenem and po fluc (completed 2/12) followed by suppressive ab's on cipro and fluc, with recent (2/28) diagnosis of bilateral  at [facility] OR   • HIP TOTAL ARTHROPLASTY REVISION Right 12/6/2018    Performed by Brigette Rodriguez DO at [facility] OR   • [address]      fusion   • TOTAL HIP REPLACEMENT Right 98, 99   • TOTAL HIP REPLACEMENT Left 98, 2014       H have...  -   Turning over in bed (including adjusting bedclothes, sheets and blankets)?: A Lot   -   Sitting down on and standing up from a chair with arms (e.g., wheelchair, bedside commode, etc.): Unable   -   Moving from lying on back to sitting on the #4   Current Status    Goal #5 Pt able to perform rolling bilaterally at MOD A x 2   Goal #5   Current Status    Goal #6 Patient to demonstrate independence with home activity/exercise instructions provided to patient in preparation for discharge.    Goal #

## 2019-03-25 NOTE — PAYOR COMM NOTE
--------------  ADMISSION REVIEW     Payor: [employer] ADV PPO  Subscriber #:  [id number]  Authorization Number: [id number]    Admit date: 3/22/19  Admit time: [address]    History & Physical    Date:  3/22/2019  Da • Personal history of antineoplastic chemotherapy    • Sleep apnea     CPAP     Past Surgical History:   Procedure Laterality Date   • CAROTID ENDARTERECTOMY Bilateral 2010   • HIP TOTAL ARTHROPLASTY REVISION Right 12/31/2018    Performed by Eamon Johnson Metoprolol Succinate ER 25 MG Oral Tablet 24 Hr Take 1 tablet by mouth daily. Pravastatin Sodium 20 MG Oral Tab Take 20 mg by mouth daily. traMADol HCl 50 MG Oral Tab Take 1 tablet (50 mg total) by mouth every 4 (four) hours as needed for Pain.    Lid was full range of motion in all the extremities. EXTREMITIES: There was no edema, clubbing or cyanosis  NEUROLOGICAL:  There was no focal deficit. Cranial nerves II through XII were intact.     Lab Results   Component Value Date    WBC 4.8 03/05/2019 redness, noted to have bilateral cellulitis, then dev MRSA bacteremia likely due to cellulitis, improved with IV vanco, also had worsening right knee pain, s/p aspiration with fluid + Gout, given colchicine with improvement.  Patient to get 4 weeks of IV v ECHO 12/6/18  EF 50-55%, LVH mild, no sig valvular abnormalities, diastolic dys, mild MR, TR     #Ischemic cardiomyopathy EF 55%  -monitor fluid status closely  -continue BB   - ECHO 12/6/18  EF 50-55%, LVH mild, no sig valvular abnormalities, diastolic dy Labs   Lab  03/22/19   1621   RBC  2.82*   HGB  7.7*   HCT  25.2*   MCV  89.4   MCH  27.3   MCHC  30.6*   RDW  17.3*   NEPRELIM  3.00   WBC  4.7   PLT  136.0*                Recent Labs   Lab  03/22/19   1621   GLU  144*   BUN  53*   CREATSERUM  1.40*   GF anemia requiring transfusion, LIN, and delirium, DC'ed to SNF on 1/23 with 6 weeks of IV meropenem and po fluc (completed 2/12) followed by suppressive ab's on cipro and fluc, with recent (2/28) diagnosis of bilateral lower ext DVT's started on eliquis, wh continued   -statin continued  - severe CAD with diffuse LAD disease and Lcx with PCI and acute closure, stress test in 12/2017 was reported as normal  -patient with pre op eval by primary cardiologist on Dr. Demetrio Hendrciks on 11/23/18 at [facility] cardiology i 7.8*   HCT  25.2*  25.6*  25.9*   MCV  89.4  90.5  91.2   MCH  27.3  27.9  27.5   MCHC  30.6*  30.9*  30.1*   RDW  17.3*  17.2*  17.4*   NEPRELIM  3.00  2.85   --    WBC  4.7  4.5  4.5   PLT  136.0*  138.0*  131.0*            Recent Labs   Lab  03/22/19 8156 Given 1 tablet Oral     3/24/2019 2147 Given 1 tablet Oral     3/24/2019 1531 Given 1 tablet Oral       allopurinol (ZYLOPRIM) tab 300 mg     Date Action Dose Route     3/25/2019 0953 Given 300 mg Oral       apixaban (ELIQUIS) tab 5 mg     Date Action mg in sodium chloride 0.9% 500 mL IVPB     Date Action Dose Route     3/24/2019 1930 Restarted 1500 mg Intravenous     3/24/2019 1750 New Bag 1500 mg Intravenous       Vancomycin HCl (FIRVANQ) 50 MG/ML oral solution 125 mg     Date Action Dose Route     3/

## 2019-03-25 NOTE — OCCUPATIONAL THERAPY NOTE
OCCUPATIONAL THERAPY Re-evaluation - INPATIENT    Room Number: 417/417-A  RE EVALUATION        Presenting Problem: (New L periprosthetic fx)     Problem List  Active Problems:    Closed right hip fracture ([address]      OCCUPATIONAL THERAPY ASSESSMENT     RN c [facility] '6 clicks' Inpatient Daily Activity Short Form. Research supports that patients with this level of impairment may benefit from BULL. Pt's progress is uncertain --pt with new L hip fx and hx of R hip infection with multiple revision.  Pending pt's prog CL    FUNCTIONAL TRANSFER ASSESSMENT  Supine to Sit : Maximum assistance(x2)  Total A  Sit to Stand: Not tested    BALANCE ASSESSMENT  Static Sitting: poor-  Dynamic Sitting: poor- to dependent   Static Standing: NT  Dynamic Standing: NT    FUNCTIONAL ADL

## 2019-03-26 NOTE — DISCHARGE SUMMARY
General Medicine Discharge Summary     Patient ID:  Janett Contreras  69year old  4/4/1940    Admit date: 3/22/2019    Discharge date and time: 03/26/19    Attending Physician: Marisel Saunders MD     Primary Care Physician: Smita Still MD     Re and fluc, with recent (2/28) diagnosis of bilateral lower ext DVT's started on eliquis, who now presents with worsening lower ext pain and redness, noted to have bilateral cellulitis, then dev MRSA bacteremia likely due to cellulitis, improved with IV vanc CKD, infection, recent surgery with acute blood loss  - monitor closely given apixaban      #CKD  - monitor Cr     #Coronary artery disease  -asa, BB continued   -statin continued  - severe CAD with diffuse LAD disease and Lcx with PCI and acute closure, s prior to this date is recommended to demonstrate stability.   Mild lateral subcutaneous stranding without a mass or loculated collection    Dictated by (CST): Alex Gallo MD on 3/24/2019 at 13:51     Approved by (CST): Alex Gallo MD on 3/24/2019 at 13:59 mouth 2 (two) times daily. What changed:    · medication strength  · how much to take  Notes to patient:  ALERT: take this medication as prescribed, to prevent blood clots and pulmonary embolism. If you are unable to afford-contact your SURGEON.      mary ULTRAM  Take 1 tablet (50 mg total) by mouth every 4 (four) hours as needed for Pain.            Where to Get Your Medications      You can get these medications from any pharmacy    Bring a paper prescription for each of these medications  · apixaban 5 MG

## 2019-03-26 NOTE — PROGRESS NOTES
[facility] dosing service    Follow-up Pharmacokinetic Consult for Vancomycin Dosing     Carmela Newman is a 69year old male admitted on 3/22 who is being treated for osteomyelitis. Patient is on day 3 of Vancomycin 1.5 gm IV Q 24 hours.   Goal troug level 15-20 ug/mL. 3.  Pharmacy will need BUN/Scr daily while on Vancomycin to assess renal function. 4.  Pharmacy will follow and monitor renal function changes, toxicity and efficacy.     kD Caldera, PharmD  3/25/2019  7:17 PM  [address]

## 2019-03-26 NOTE — PROGRESS NOTES
[address] [facility] dosing service    Follow-up Pharmacokinetic Consult for Vancomycin Dosing     Leo Ramires is a 69year old male admitted on 3/22 who is being treated for r hip infection and mrsa. Patient is on day 3 of Vancomycin 1.5 gm IV Q 24 hours. trough level 15-20 ug/mL. 3.  Pharmacy will need BUN/Scr daily while on Vancomycin to assess renal function. 4.  Pharmacy will follow and monitor renal function changes, toxicity and efficacy.     Elis Ortiz, PharmD  3/25/2019  7:17 PM  [place] [facility]

## 2019-03-26 NOTE — CM/SW NOTE
Ins.auth. Received. Patient is scheduled to transfer to [facility] at 3 pm today, 3.26.19 via Ambulance due to being max assist.    PCS form in the chart.     Room [address] [facility],Third Floor      Adri Gibbs LMSW., [id number]

## 2019-04-02 NOTE — PAYOR COMM NOTE
--------------  DISCHARGE REVIEW    Payor: BCBS MEDICARE ADV PPO  Subscriber #:  [id number]  Authorization Number: PEND [id number]    Admit date: 3/22/19  Admit time:  1210  Discharge Date: 3/26/2019  2:50 PM     Admitting Physician: Lauro Connelly tim.      Hospital Course:       Mr. Lemuel Veliz is a 68 yo male [address] sig for bladder ca s/p chemo, BPH, CKD 3, CAD,  HTN, HLD, SAM on cpap, PVD, chronic low back pain, OA with prior R hip replacement with revision on 12/6, then reinfection with readmit t femoral vein, left common fem,  - started on eliquis 10mg BID for 7 days on 2/28/19, to transition to 5mg BID on 3/8, continue     #Recent MRSA bacteremia  - on prior admission 2/2 cellulitis, seen by ID, planned for 4 weeks of IV vancomycin      # Hx of I normal, CN intact, sensory intact  Psych: Affect- normal  SKIN: warm, dry        Operative Procedures:      Imaging: Ct Hip(bone) Left (cpt=73700)    Result Date: 3/24/2019  CONCLUSION:   Minimally displaced fracture of the greater trochanter which abuts t Underinflation. Otherwise, no acute cardiopulmonary abnormality.    Dictated by (CST): Perry Blas MD on 3/24/2019 at 18:49     Approved by (CST): Perry Blas MD on 3/24/2019 at 18:51            Disposition: SNF    Home Medication Changes:          Medic MYCOSTATIN  Apply 1 Application topically 2 (two) times daily.      omeprazole 20 MG Cpdr  Commonly known as:  PRILOSEC     PEG 3350 Pack  Commonly known as:  MIRALAX     Pravastatin Sodium 20 MG Tabs  Commonly known as:  PRAVACHOL     QUEtiapine Fumarate 1

## 2019-08-01 PROBLEM — S72.001A CLOSED RIGHT HIP FRACTURE (HCC): Status: RESOLVED | Noted: 2019-01-01 | Resolved: 2019-01-01

## 2019-08-01 PROBLEM — T14.8XXA WOUND INFECTION: Status: RESOLVED | Noted: 2018-12-28 | Resolved: 2019-01-01

## 2019-08-01 PROBLEM — A41.02 SEPSIS DUE TO METHICILLIN RESISTANT STAPHYLOCOCCUS AUREUS (MRSA) (HCC): Status: ACTIVE | Noted: 2019-01-01

## 2019-08-01 PROBLEM — L08.9 WOUND INFECTION: Status: RESOLVED | Noted: 2018-12-28 | Resolved: 2019-01-01

## 2019-08-06 PROBLEM — I82.403 ACUTE DEEP VEIN THROMBOSIS (DVT) OF BOTH LOWER EXTREMITIES, UNSPECIFIED VEIN (HCC): Status: RESOLVED | Noted: 2019-03-01 | Resolved: 2019-01-01

## 2019-08-08 PROBLEM — D69.6 THROMBOCYTOPENIA (HCC): Status: ACTIVE | Noted: 2019-01-01

## 2019-11-21 PROBLEM — F33.9 RECURRENT MAJOR DEPRESSIVE DISORDER (HCC): Status: ACTIVE | Noted: 2019-01-01

## 2019-11-21 PROBLEM — I82.5Z3 CHRONIC DEEP VEIN THROMBOSIS (DVT) OF DISTAL VEIN OF BOTH LOWER EXTREMITIES (HCC): Status: ACTIVE | Noted: 2019-01-01

## 2019-11-21 PROBLEM — I77.9 CAROTID DISEASE, BILATERAL (HCC): Status: ACTIVE | Noted: 2019-01-01

## 2019-12-16 PROBLEM — Z01.818 PREOP TESTING: Status: RESOLVED | Noted: 2018-12-06 | Resolved: 2019-01-01

## 2020-02-18 PROBLEM — I50.22 CHRONIC SYSTOLIC (CONGESTIVE) HEART FAILURE (HCC): Status: ACTIVE | Noted: 2020-01-01

## 2023-11-15 NOTE — PROGRESS NOTES
Assessment and Plan:     Assessment/Plan:  69year old male presenting with:     1) R JOHN c/b recurrent infections, plan for hardware explant 12/31/18  2) CAD s/p unsuccessful LCx PCI in the past; diffuse LAD disease per report  3) PVD  4) HTN  5) HL  6) Please call the patient regarding dexa scan - bone mineral density improved Interval revision right hip arthroplasty with expected soft tissue swelling and gas. No radiographic evidence of immediate hardware complication. There remains extensive lucency and cortical disruption of the right proximal femur.  Given the clinical contex

## 2025-06-24 NOTE — PLAN OF CARE
DISCHARGE PLANNING    • Discharge to home or other facility with appropriate resources Progressing        Impaired Functional Mobility    • Achieve highest/safest level of mobility/gait Progressing        PAIN - ADULT    • Verbalizes/displays adequate comf How Severe Are Your Spot(S)?: mild Have Your Spot(S) Been Treated In The Past?: has not been treated Hpi Title: Evaluation of a Skin Lesion Family Member: Brother

## (undated) DEVICE — 3M™ STERI-DRAPE™ INSTRUMENT POUCH 1018L: Brand: STERI-DRAPE™

## (undated) DEVICE — CULTURE TUBE ANAEROBIC

## (undated) DEVICE — DRAPE SRG U 124X80IN U REINF

## (undated) DEVICE — BLADE ELECTRODE: Brand: EDGE

## (undated) DEVICE — FLEXIBLE YANKAUER,MEDIUM TIP, NO VACUUM CONTROL: Brand: ARGYLE

## (undated) DEVICE — Device

## (undated) DEVICE — NEEDLE HPO 18GA 1.5IN ECLPS

## (undated) DEVICE — TUBING SUCTION COLLECTION SET

## (undated) DEVICE — SUTURE VICRYL 0 J340H

## (undated) DEVICE — 3M™ TEGADERM™ TRANSPARENT FILM DRESSING, 1626W, 4 IN X 4-3/4 IN (10 CM X 12 CM), 50 EACH/CARTON, 4 CARTON/CASE: Brand: 3M™ TEGADERM™

## (undated) DEVICE — DRAPE SRG 70X60IN SPLT U IMPRV

## (undated) DEVICE — SPONGE LAP 18X18 XRAY STRL

## (undated) DEVICE — T5 HOOD WITH PEEL AWAY FACE SHIELD

## (undated) DEVICE — SOL  .9 1000ML BTL

## (undated) DEVICE — SYRINGE MNJCT 35ML LF STRL LL

## (undated) DEVICE — CONTAINER SPEC STR 4OZ GRY LID

## (undated) DEVICE — SUTURE VICRYL 2-0 CT-1

## (undated) DEVICE — FAN SPRAY KIT: Brand: PULSAVAC®

## (undated) DEVICE — 2DE14 2-0 PDO 24 X 24: Brand: 2DE14 2-0 PDO 24 X 24

## (undated) DEVICE — PRECISION OFFSET (9.0 X 0.51 X 25.0MM)

## (undated) DEVICE — PENCIL ESURG 10FT 3/32IN SS

## (undated) DEVICE — GAMMEX® NON-LATEX PI ORTHO SIZE 8, STERILE POLYISOPRENE POWDER-FREE SURGICAL GLOVE: Brand: GAMMEX

## (undated) DEVICE — PROXIMATE RH ROTATING HEAD SKIN STAPLERS (35 WIDE) CONTAINS 35 STAINLESS STEEL STAPLES: Brand: PROXIMATE

## (undated) DEVICE — ENCORE® LATEX ACCLAIM SIZE 8, STERILE LATEX POWDER-FREE SURGICAL GLOVE: Brand: ENCORE

## (undated) DEVICE — PROXIMATE SKIN STAPLERS (35 WIDE) CONTAINS 35 STAINLESS STEEL STAPLES (FIXED HEAD): Brand: PROXIMATE

## (undated) DEVICE — 450 ML BOTTLE OF 0.05% CHLORHEXIDINE GLUCONATE IN 99.95% STERILE WATER FOR IRRIGATION, USP AND APPLICATOR.: Brand: IRRISEPT ANTIMICROBIAL WOUND LAVAGE

## (undated) DEVICE — PICO 7 10CM X 40CM: Brand: PICO™ 7

## (undated) DEVICE — 3M™ IOBAN™ 2 ANTIMICROBIAL INCISE DRAPE 6651EZ: Brand: IOBAN™ 2

## (undated) DEVICE — NEEDLE SPINAL 18X3-1/2 PINK.

## (undated) DEVICE — SUTURE PDS II 2-0 CP

## (undated) DEVICE — GOWN SURG AERO BLUE PERF XLG

## (undated) DEVICE — DRAPE SHEET LG

## (undated) DEVICE — PEN: MARKING STD PT 100/CS: Brand: MEDICAL ACTION INDUSTRIES

## (undated) DEVICE — 3M™ IOBAN™ 2 ANTIMICROBIAL INCISE DRAPE 6640EZ: Brand: IOBAN™ 2

## (undated) DEVICE — BACTISURE

## (undated) DEVICE — KIT DRN 1/8IN PVC 3 SPRG EVAC

## (undated) DEVICE — MEDI-VAC NON-CONDUCTIVE SUCTION TUBING: Brand: CARDINAL HEALTH

## (undated) DEVICE — DERMABOND LIQUID ADHESIVE

## (undated) DEVICE — SOL  .9 3000ML

## (undated) DEVICE — HOOD, PEEL-AWAY: Brand: FLYTE

## (undated) DEVICE — 2T11 #2 PDO 36 X 36: Brand: 2T11 #2 PDO 36 X 36

## (undated) DEVICE — CHLORAPREP 26ML APPLICATOR

## (undated) DEVICE — SUTURE ETHIBOND 1 CT-1

## (undated) DEVICE — PILLOW ABDUCTION HIP MED

## (undated) DEVICE — BOWL CEMENT MIX QUICK-VAC

## (undated) DEVICE — BIPOLAR SEALER 23-112-1 AQM 6.0: Brand: AQUAMANTYS™

## (undated) DEVICE — COVER SGL STRL LGHT HNDL BLU

## (undated) DEVICE — BATTERY

## (undated) DEVICE — ENCORE® LATEX MICRO SIZE 8, STERILE LATEX POWDER-FREE SURGICAL GLOVE: Brand: ENCORE

## (undated) DEVICE — PACK CDS TOTAL HIP

## (undated) DEVICE — SUTURE PDS II 0 Z991G

## (undated) DEVICE — CULTURE COLLECT/TRANSPORT SYS

## (undated) NOTE — IP AVS SNAPSHOT
Patient Demographics     Address  [address] [place] [address] Phone  [phone] (Home) *Preferred*  [phone] [facility]) E-mail Address  [email or web address]      Emergency Contact(s)     Name Relation Home Work [address] Son Elda Atkinson MD. Schedule an appointment as soon as possible for a visit in 1 week.     Specialty:  Family Medicine  Why:  after rehab discharge  Contact information:  Areli Reyna Pravastatin Sodium 20 MG Tabs  Commonly known as:  PRAVACHOL  Next dose due: Tomorrow morning      Take 20 mg by mouth daily.           QUEtiapine Fumarate 100 MG Tabs  Commonly known as:  SEROQUEL  Next dose due:  tonight      Take 1 tablet (100 mg total) [id number] famoTIDine (PEPCID) tab 20 mg (Or Linked Group #1) 12/11/18 1021 Given              Recent Vital Signs       Most Recent Value   Vitals  130/50 Filed at 12/11/2018 1300   Pulse  70 Filed at 12/11/2018 1300   Resp  18 Filed at 12/11/2018 1300   T Specimen:   Body fluid, unspecified from Hip, right      Aerobic Culture Result No Growth 3 Days     Aerobic Smear 1+ WBCs seen      No organisms seen         H&P - H&P Note      H&P signed by Velasquez Edmond DO at 12/6/2018  5:38 PM  Version 2 of 2    Author 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 cataplexy  -provigil      Chronic bilateral low back pain with bilateral sciatica/chronic opiate use  -percocet 3x/day baseline    Obesity- BMI 36  -o/p weight loss endeavors    Insomnia  -home meds prn[LA. 2]    FEN:  - IVF:[LA.1 Last ECHO 8/17, EF > 50%, concentric LVH   • ARRIOLA (nonalcoholic steatohepatitis) 2016   • Obesity, Class III, BMI 40-49.9 (morbid obesity) ([facility]    • Osteoarthritis    • Peripheral vascular disease ([facility] 2016   • Personal history of antineoplastic chemoth aspirin 81 MG Oral Tab Take 1 tablet by mouth daily. Disp: 100 tablet Rfl: 1   oxyCODONE-acetaminophen (PERCOCET)  MG Oral Tab Take 1 tablet by mouth every 4 (four) hours as needed.  Disp:  Rfl:          Soc Hx  Social History    Tobacco Use      Smok Neurologic:[LA.1] Able to move b/l LE[LA.2], no focal deficit appreciated     Diagnostic Data:    CBC/Chem    No results for input(s): RBC, HGB, HCT, MCV, MCH, MCHC, RDW, NEPRELIM, WBC, PLT in the last 168 hours.       No results for input(s): GLU, BUN, CRE need increased dose percocet pending course as pt takes at baseline  -monitor for post op acute blood loss anemia, pre-op hgb is 12.8  -prn anti-emetics, bowel regimen  -ecotrin BID for prophy  -pt/ot/sw  -as per ortho    Coronary artery disease  -pre-op c 40, ARRIOLA, OA with prior R hip replacement who presents s/p R hip revision. [LA.1] Post op pain is constant, severe non radiating, even after fentanyl in PACU. No chest pain/sob, no dizziness, no n/v. Feeling hungry.  Minimal other info avail as pt is somewha escitalopram 20 MG Oral Tab Take 1 tablet (20 mg total) by mouth daily.  Disp: 90 tablet Rfl: 3   colchicine 0.6 MG Oral Tab Take 1 tablet twice a day for three days Disp: 6 tablet Rfl: 10   allopurinol 300 MG Oral Tab Take 1 tablet (300 mg total) by mouth Throat: Lips, mucosa, and tongue normal. Teeth and gums normal.   Neck: Supple, symmetrical, trachea midline, no cervical or supraclavicular lymph adenopathy, thyroid: no enlargment/tenderness/nodules appreciated   Lungs:   Clear to auscultation bilaterall : Toño Suero MD (Physician)     Consult Orders    1.  Inpatient consult to PsychIATRY Once [[id number]] ordered by Zandra Velasco DO at 12/09/18 [address] [place] [address]    Report of Consultation     Kinds CPAP every day. Patient remember later that he slept in room with other patient and it was noisy he could not to sleep?     The patient according to RN did not sleep more than an hour last night and he has been talking to himself but at least he is more re • TOTAL HIP REPLACEMENT Right 98, 99   • TOTAL HIP REPLACEMENT Left 98, 2014       Family History  Family History   Problem Relation Age of Onset   • Hypertension Father    • Heart Disorder Father    • Heart Disorder Mother         MI   • Cancer Mother bisacodyl (DULCOLAX) rectal suppository 10 mg 10 mg Rectal Daily PRN   FLEET ENEMA (FLEET) 7-19 GM/118ML enema 133 mL 1 enema Rectal Once PRN   diphenhydrAMINE (BENADRYL) cap/tab 25 mg 25 mg Oral Q4H PRN   Or      DiphenhydrAMINE HCl (BENADRYL) injection 1 modafinil (PROVIGIL) 200 MG Oral Tab    aspirin 81 MG Oral Tab Take 1 tablet by mouth daily. oxyCODONE-acetaminophen (PERCOCET)  MG Oral Tab Take 1 tablet by mouth every 4 (four) hours as needed.        Allergies  No Known Allergies      Review of S The patient is alert and oriented to self but not to place, date or condition. An obese male sitting in his recliner was talking to himself prior to my visit. Patient presented calm and cooperative otherwise somewhat restless.   Patient was talkative with Arterial blood gas      CBC With Differential With Platelet      Type and screen      Specimen to Pathology, Tissue      MRSA Screen by PCR      MRSA Screen by PCR      Aerobic Bacterial Culture      Anaerobic Culture      Anaerobic Culture      Tissue with the therapy session. Pt reported that he had to go to the bathroom to have a BM. Pt was max A x2 with bed mobility and to transfer to the EOB. Pt required assist with his R LE due to his no active abduction precaution.  Pt also required assist with his bathroom requiring assist. Pt is on track to dc to rehab once medically cleared. [RM.2]           DISCHARGE RECOMMENDATIONS  PT Discharge Recommendations: Sub-acute rehabilitation;24 hour care/supervision    PLAN  PT Treatment Plan: Bed mobility; Body mechan Comment : Pt with slow shabbir.          Exercises AM Session PM Session   Ankle Pumps     20 reps 0 reps   Quad Sets 0 reps 0 reps   Glut Sets 20 reps 0 reps   Hip Abd/Add 0 reps 0 reps   Heel slides 0 reps 0 reps   Saq 0 reps 0 reps   Sitting Knee Extensi Author:  Giuseppe Etienne PTA Service:  Rehab Author Type:  Physical Therapy Assistant    Filed:  12/10/2018 11:34 AM Date of Service:  12/10/2018 10:56 AM Status:  Addendum    :  Giuseppe Etienne PTA (Physical Therapy Assistant)    Related Note functional mobility. Pt is cued for correct form. Pt also educated on all his hip precautions and was able to recite 2/4. Re-enforced the importance of those precautions. Pt is[RM.1] planning[RM. 2] to dc to rehab once medically cleared.   The patient's Appr -   Need to walk in hospital room?: A Little   -   Climbing 3-5 steps with a railing?: A Lot     AM-PAC Score:  Raw Score: 14   Approx Degree of Impairment: 61.29%   Standardized Score (AM-PAC Scale): 38.1   CMS Modifier (G-Code): CL    FUNCTIONAL ABILITY Current Status Educated and performed. [RM.1]         Attribution Key    RM. 1 - Casie Jefferson, PTA on 12/10/2018 10:56 AM  RM. 2 - Casie Jefferson, PTA on 12/10/2018 11:34 AM               Physical Therapy Note signed by Yanna Robins, TAM at 12/10 correct sequencing and posture. Returned pt to sitting in the chair with all needs within reach and alarm system activated. Pt educated and performed B LE exercises to increase strength/endurence to improve functional mobility. Pt is cued for correct form. -   Moving from lying on back to sitting on the side of the bed?: A Lot   How much help from another person does the patient currently need. ..   -   Moving to and from a bed to a chair (including a wheelchair)?: A Little   -   Need to walk in hospital room Current Status Educated and re-enforced. Goal #6 Patient independently performs home exercise program for ROM/strengthening per the instructions provided in preparation for discharge. Goal #6  Current Status Educated and performed. [RM.1]         Attri care/supervision[RM.2]    PLAN[RM. 1]  PT Treatment Plan: Bed mobility; Body mechanics; Patient education;Gait training;Strengthening;Transfer training;Balance training[RM. 2]    SUBJECTIVE  Pt was agreeable to therapy session.      OBJECTIVE[RM.1]  Precautions Ankle Pumps     20 reps 0 reps   Quad Sets 20 reps 0 reps   Glut Sets 20 reps 0 reps   Hip Abd/Add 0 reps 0 reps   Heel slides 0 reps 0 reps   Saq 0 reps 0 reps   Sitting Knee Extension 10 reps 0 reps   Standing heel/toe raises 0 reps 0 reps   Hamstring Cu PHYSICAL THERAPY TREATMENT NOTE - INPATIENT     Room Number: 415/415-A[CK.1]       Presenting Problem: right arthroplasty with need for revision with noted prox femur fx ---pt is s/p right hip revision posterior approach  with repair of fx and hip abd tend -   Turning over in bed (including adjusting bedclothes, sheets and blankets)?: A Lot   -   Sitting down on and standing up from a chair with arms (e.g., wheelchair, bedside commode, etc.): A Lot   -   Moving from lying on back to sitting on the side of th Goal #5 Patient verbalizes and/or demonstrates all precautions and safety concerns independently   Goal #5   Current Status Pt is confused    Handout in room   Education with dtr    Goal #6 Patient independently performs home exercise program for ROM/stren and min a. Pt able to recall posterior precautions 3/3 w/o prompts. Pt requires mod a to [employer]. Le dressing techniques discussed. Pt verbalizing he was familiar w/ strategies from prior surgeries.  At end of session pt remaining up in washroom per Sit to Stand:  Moderate assistance(x2)  Toilet Transfer: cga for rolling chair  Shower Transfer: na  Chair Transfer: mod a x 2  Car Transfer: na    Bedroom Mobility: rw and min a    BALANCE ASSESSMENT  Static Sitting: independent  Dynamic Sitting: Dhara Mcclain

## (undated) NOTE — IP AVS SNAPSHOT
[facility]            (For Outpatient Use Only) Initial Admit Date: 12/28/2018   Inpt/Obs Admit Date: Inpt: 12/28/18 / Obs: N/A   Discharge Date:    Rut Nguyen:  [de-identified]   MRN: [de-identified]   CSN: [id number]        ENCOUNTER  Patient Cla January 23, 2019

## (undated) NOTE — IP AVS SNAPSHOT
[facility]            (For Outpatient Use Only) Initial Admit Date: 3/22/2019   Inpt/Obs Admit Date: Inpt: 3/22/19 / Obs: N/A   Discharge Date:    Sailaja Meng:  [de-identified]   MRN: [de-identified]   CSN: [id number]        ENCOUNTER  Patient Class Hospital Account Financial Class: Medicare Advantage    March 26, 2019

## (undated) NOTE — IP AVS SNAPSHOT
Patient Demographics     Address  [address] [place] [address] Phone  [phone] (Home) *Preferred*  [phone] [facility]) E-mail Address  [email or web address]      Emergency Contact(s)     Name Relation Home Work [address] Son allopurinol 300 MG Tabs  Commonly known as:  ZYLOPRIM  Next dose due:  3/6/19 morning      Take 1 tablet (300 mg total) by mouth daily.    Ginny Escalante MD         apixaban 2.5 MG Tabs  Commonly known as:  ELIQUIS  Next dose due:  3/5/19 bedtime Losartan Potassium 25 MG Tabs  Commonly known as:  COZAAR  Next dose due:  3/6/19 morning      Take 1 tablet (25 mg total) by mouth daily. Germania Bacon DO         Metoprolol Succinate ER 25 MG Tb24  Commonly known as:   Toprol XL  Next dose due:  3/6/19 mo [id number] - MAR ACTION REPORT  (last 24 hrs)    ** SITE UNKNOWN **     Order ID Medication Name Action Time Action Reason Comments    [id number] 0.9%  NaCl infusion 03/05/19 1425 New Bag      [id number] Ciprofloxacin HCl (CIPRO) tab 250 mg 03/05/19 9888 Give [id number] mupirocin (BACTROBAN) 2% nasal ointment OINT 1 Application 95/09/01 4779 Given      [id number] traMADol HCl (ULTRAM) tab 50 mg 03/04/19 1857 Given              Recent Vital Signs       Most Recent Value   Vitals  134/69 Filed at 03/05/2019 1655 Component Value Reference Range Flag Lab   Glucose 88 70 - 99 mg/dL — [facility]   Sodium 140 136 - 145 mmol/L — [facility]   Potassium 4.3 3.5 - 5.1 mmol/L — [facility]   Chloride 107 98 - 107 mmol/L — [facility]   CO2 22.0 21.0 - 32.0 mmol/L — El Order Status:  Completed Lab Status:  Preliminary result Updated:  03/05/19 1500    Specimen:  Blood,peripheral      Blood Culture Result No Growth 3 Days    Blood Culture FREQ X 2 [[id number]] Collected:  03/02/19 1420    Order Status:  Completed Lab Stat Order Status:  Completed Lab Status:  Final result Updated:  03/01/19 3535    Specimen:  Other from [place]      MRSA Screen By PCR Positive    Narrative:       A positive result does not necessarily indicate the presence of viable organisms.  For [employer] continued to have more pain, and redness so was sent to the ER.   He denies chest pain, no sob, no nausea or vomiting, no fevers or chills, no headaches or vision changes no other complaints.[TN.2]        PMH  Past Medical History:   Diagnosis Date   • Bindus Quit date: 2014        Years since quittin.1      Smokeless tobacco: Former User    Alcohol use:  Yes      Alcohol/week: 0.0 oz      Comment: 1-2 glasses of wine/week       Fam Hx  Family History   Problem Relation Age of Onset   • Hypertensio No results for input(s): TROP in the last 168 hours.         Radiology:         ASSESSMENT / PLAN:[TN.1]   Mr. Madelaine Loving is a 68 yo male [address] sig for bladder ca s/p chemo, BPH, CKD 3, CAD, depression, HTN, HLD, SAM on cpap, PVD, chronic low back pain, obes -multifactorial with CKD, infection, recent surgery with acute blood loss  - intra op blood loss reportedly of 1.3- 2L  - DC hgb 7.9  - hgb 7.7 -> 7.4  - check iron studies  - continue iron     #LIN on CKD (chronic kidney disease) stage 3, GFR 30-59 ml/min Atrophy:[TN.1] IS[TN.2]   Lines:[TN.1] PIV[TN.2]    Dispo: pending clinical course[TN.1], [place] on DC[TN.2]    Outpatient records or previous hospital records reviewed. Further recommendations pending patient's clinical course.   [place] [facility] suppressive ab's on cipro and fluc, with recent (2/28) diagnosis of bilateral lower ext DVT's started on eliquis, who now presents with worsening lower ext pain and redness.   Patient states for the past 1 week he has had worsening pain in his bilateral low Home Medications:    Outpatient Medications Marked as Taking for the 3/1/19 encounter [facility] Encounter):  Lidocaine 4 % External Patch Apply 1 patch topically daily.  Disp:  Rfl:          Soc Hx  Social History    Tobacco Use      Smoking status: Current PLT  220.0   --   187.0   INR  2.15*  2.11*   --        Recent Labs   Lab  03/01/19 1941 03/02/19   0436   NA  135*  135*   K  4.5  3.9   CL  102  103   CO2  24.0  23.0   BUN  44*  43*   CREATSERUM  1.43*  1.39*   GLU  130*  96   CA  8.8  8.5       Rece - completed 6 weeks of merop and fluc  - changed to oral cipro and fluc for suppression  - Ortho and ID consulted  - dr. Kaycee Purdy to eval in am      Acute blood loss anemia  -multifactorial with CKD, infection, recent surgery with acute blood loss  - intra op #Depression:  -escitalopram 20 mg daily[TN.2]    FN:  - IVF:[TN.1] none[TN.2]  - Diet:[TN.1] cardiac[TN. 2]    DVT Prophy:[TN.1] on eliquis treatment dose[TN.2]  Atrophy:[TN.1] IS[TN.2]   Lines:[TN.1] PIV[TN.2]    Dispo: pending clinical course[TN.1]Brianna Patient well-known to me with a complicated history of infection to a revision right total hip replacement. He is admitted with bilateral lower extremity DVTs. I was asked to evaluate his incision.   He is also complaining of new onset left knee pain and • TOTAL HIP REPLACEMENT Left 98, 2014       Family History  Family History   Problem Relation Age of Onset   • Hypertension Father    • Heart Disorder Father    • Heart Disorder Mother         MI   • Cancer Mother         Breast CA   • Heart Disorder Broth lidocaine (LIDODERM) 5 % 2 patch 2 patch Transdermal Q24H   Normal Saline Flush 0.9 % injection 3 mL 3 mL Intravenous PRN   acetaminophen (TYLENOL) tab 650 mg 650 mg Oral Q6H PRN   ondansetron HCl (ZOFRAN) injection 4 mg 4 mg Intravenous Q6H PRN   Metoclop docusate sodium 100 MG Oral Cap Take 100 mg by mouth 2 (two) times daily. allopurinol 300 MG Oral Tab Take 1 tablet (300 mg total) by mouth daily. Metoprolol Succinate ER 25 MG Oral Tablet 24 Hr Take 1 tablet by mouth daily.    Pravastatin Sodium 20 MG he has pain with passive range of motion, he has good strength and stability of the left ankle joint and plantar flexion dorsiflexion, he is neurovascular intact distally with palpable pedal pulses    Results:     Laboratory Data:  Lab Results   Component incision. He can be up weightbearing as tolerated with physical therapy. I will see him in the office for follow-up in 2 weeks. Bilateral knee aspiration procedure:     The right and left knee were prepped over the superior lateral patellar space with this date with encouragement. Patient's primary complaint this date is pain in L knee and L ankle. Improved bilateral knee flexion to 50* today- observed sitting EOB. Therapist educated patient on POC and benefits of mobilization.    Bed Mobility:Max A x 2 Precautions: Bed/chair alarm; Other (Comment)(contact iso MRSA)[TL.2]    WEIGHT BEARING RESTRICTION[TL.1]  Weight Bearing Restriction: None[TL.2]                PAIN ASSESSMENT[TL.1]   Rating: Unable to rate  Location: L knee, L ankle  Management Techniques Goal #1 Patient is able to demonstrate supine - sit EOB @ level: Min A x 1      Goal #1   Current Status  Max x 2   Goal #2 Patient is able to demonstrate transfers EOB to/from Chair/Wheelchair at assistance level: moderate assistance with walker - rolling 3/3- findings consistent with gout .   Patient's current functional deficits include impaired bilateral LE ROM and strength, impaired tolerance toward activity due to bilateral knee pain, impaired bed mobility, transfers, ambulation and balance, which are b conservation;Patient education; Family education;Strengthening;Transfer training;Balance training;Gait training  Rehab Potential : Fair  Frequency (Obs): 3x/week[TL.2]       PHYSICAL THERAPY MEDICAL/SOCIAL HISTORY       Problem List[TL. 1]  Principal Problem Home Layout: One level     Lives With: Staff 24 hours  Drives: No  Patient Owned Equipment: (over last several weeks has been bed bound/WC bound)  Patient Regularly Uses: [place]. 2]    Prior Level of [place]: Working with PT and OT in sub-acute [facility] -   Need to walk in hospital room?: Total   -   Climbing 3-5 steps with a railing?: Total[TL. 2]     AM-PAC Score:[TL.1]  Raw Score: 9   Approx Degree of Impairment: 81.38%   Standardized Score (AM-PAC Scale): 30.55   CMS Modifier (G-Code): CM[TL.2]    FUNC Author: Kena Wiseman OT Service:  Rehab Author Type:  Occupational Therapist    Filed:  3/5/2019 10:05 AM Date of Service:  3/5/2019  9:56 AM Status:  Signed    :   Kena Wiseman OT (Occupational Therapist)       [address] pt up to chair, alarm set, breakfast arrived, PCT in room. The patient's Approx Degree of Impairment: 63.03% has been calculated based on documentation in the [facility] '6 clicks' Inpatient Daily Activity Short Form.   Research supports that patients with t • Personal history of antineoplastic chemotherapy    • Sleep apnea     CPAP       Past Surgical History  Past Surgical History:   Procedure Laterality Date   • CAROTID ENDARTERECTOMY Bilateral 2010   • HIP TOTAL ARTHROPLASTY REVISION Right 12/31/2018    Pe STRENGTH ASSESSMENT  Upper extremity strength is within functional limits    COORDINATION  Gross Motor: impaired   Fine Motor: WFL     ACTIVITIES OF DAILY LIVING ASSESSMENT  AM-PAC ‘6-Clicks’ Inpatient Daily Activity Short Form  How much help from another [facility][ST.1]        Attribution Perez    ST. 1 - Opal Ryan OT on 3/5/2019  9:56 AM                     Video Swallow Study Notes     No notes of this type exist for this encounter.       SLP Notes     No notes of this type exist for this enc

## (undated) NOTE — IP AVS SNAPSHOT
[facility]            (For Outpatient Use Only) Initial Admit Date: 12/6/2018   Inpt/Obs Admit Date: Inpt: 12/6/18 / Obs: N/A   Discharge Date:    Fernanda Layne:  [de-identified]   MRN: [de-identified]   CSN: [id number]        ENCOUNTER  Patient Class Hospital Account Financial Class: Medicare Advantage    December 11, 2018

## (undated) NOTE — IP AVS SNAPSHOT
Patient Demographics     Address  [address] [place] [address] Phone  [phone] (Home) *Preferred*  [phone] [facility]) E-mail Address  [email or web address]      Emergency Contact(s)     Name Relation Home Work [address] Son Xiomara Sanon MD         docusate sodium 100 MG Caps  Commonly known as:  COLACE      Take 100 mg by mouth 2 (two) times daily.    Germania Bacon DO         Enoxaparin Sodium 40 MG/0.4ML Soln  Commonly known as:  LOVENOX      Inject 0.4 mL (40 mg total) Inject 500 mg into the vein daily for 20 days. Stop taking on:  2/12/2019   Bozena Montes De Oca MD         Vancomycin HCl 50 MG/ML Solr  Commonly known as:  FIRVANQ      Take 2.5 mL (125 mg total) by mouth daily.   Stop taking on:  1/29/2019   Chante Caro, [id number] fluconazole (DIFLUCAN) tab 200 mg 01/23/19 1352 Given      [id number] magnesium oxide (MAG-OX) tab 400 mg 01/23/19 1002 Given      [id number] melatonin tab TABS 3 mg 01/22/19 2126 Given      [id number] minerin (EUCERIN) cream 01/23/19 1003 Given GFR, -American 60 >=60 — [facility]   Comment:           Estimated GFR units: mL/min/1.73 square meters   eGFR calculated by the CKD-EPI equation.             MAGNESIUM [[id number]] (Normal)  Resulted: 01/23/19 0548, Result status: Final result   Or Order Status:  Completed Lab Status:  Final result Updated:  01/08/19 2132    Specimen:  Stool      Occult Blood Negative    Anaerobic Culture [[id number]] Collected:  12/31/18 1344    Order Status:  Completed Lab Status:  Final result Updated:  01/06/19 0 MRSA Screen by PCR Once [[id number]]  (Normal) Collected:  12/28/18 7787    Order Status:  Completed Lab Status:  Final result Updated:  12/29/18 1011    Specimen:  Other from [facility]      MRSA Screen By PCR Negative         H&P - H&P Note      H&P signed by • High blood pressure    • High cholesterol    • Hyperuricemia    • Insomnia    • Ischemic cardiomyopathy 2016    Last ECHO 8/17, EF > 50%, concentric LVH   • ARRIOLA (nonalcoholic steatohepatitis) 2016   • Obesity, Class III, BMI 40-49.9 (morbid obesity) (HC Senna 17.2 MG Oral Tab Take 1 tablet (17.2 mg total) by mouth nightly. Disp:  Rfl: 0   Losartan Potassium 25 MG Oral Tab Take 1 tablet (25 mg total) by mouth daily. Disp:  Rfl: 0   docusate sodium 100 MG Oral Cap Take 100 mg by mouth 2 (two) times daily.  D Neurologic: Normal strength      Diagnostic Data:    CBC/Chem  Recent Labs   Lab  12/28/18 2241 12/29/18   0620   WBC  6.8  6.6   HGB  7.2*  7.7*   MCV  90.0  89.6   PLT  210  202   INR  1.4*   --        Recent Labs   Lab  12/28/18 2241 12/29/18   06 -CC continued   -statin continued     #Ischemic cardiomyopathy EF 50%  -monitor fluid status closely  -continue BB, resume arb when able     #LIN on CKD (chronic kidney disease) stage 3, GFR 30-59 ml/min ([facility])  -baseline creat 1.6, was 2.2 at OSH but impro TN.2 Alisa Roman MD on 12/29/2018 12:18 PM  TN. 3 - Hung Kate MD on 12/29/2018 12:39 PM               H&P signed by Hung Kate MD at 12/29/2018 12:39 PM  Version 1 of 2    Author:  Hung Kate MD Service:  Hospitalist Author Type:  Physician    Filed:  12/ • ARRIOLA (nonalcoholic steatohepatitis) 2016   • Obesity, Class III, BMI 40-49.9 (morbid obesity) ([facility]    • Osteoarthritis    • Per[facility] disease ([facility] 2016   • Personal history of antineoplastic chemotherapy    • Sleep apnea     CPAP        PSH docusate sodium 100 MG Oral Cap Take 100 mg by mouth 2 (two) times daily. Disp:  Rfl: 0   escitalopram 20 MG Oral Tab Take 1 tablet (20 mg total) by mouth daily.  Disp: 90 tablet Rfl: 3   Metoprolol Succinate ER 25 MG Oral Tablet 24 Hr Take 1 tablet by mout Recent Labs   Lab  12/28/18   2241  12/29/18   0620   NA  139  140   K  4.2  4.0   CL  110  109   CO2  22  21*   BUN  40*  34*   CREATSERUM  1.94*  1.68*   GLU  126*  106*   CA  8.0*  8.4*   MG  1.6*  2.1       Recent Labs   Lab  12/28/18 2241   ALT -baseline creat 1.6, was 2.2 at OSH but improved to baseline with low rate IVF  -monitor stable today  -would avoid BID asa for DVT prophy in future given CKD     #Anemia:  -multifactorial with CKD, infection, recent surgery with acute blood loss  -prior h Filed:  2019  2:03 PM Date of Service:  2019  2:07 PM Status:  Signed    :  Sanchez Orantes MD (Physician)       Neurology Inpatient Consult Note    Janett Contreras : 1940   Referring Physician: Dr. Maria Rockwell  HPI:     [address] • Sleep apnea     CPAP      Past Surgical History:   Procedure Laterality Date   • CAROTID ENDARTERECTOMY Bilateral 2010   • HIP TOTAL ARTHROPLASTY REVISION Right 12/31/2018    Performed by Bettina Wild DO at [address] [facility] [address] able to tell me his name, where he is at, and the year; however, has very poor focus; speech fluent but very slow; follows two-step commands but this requires repeated prompting; at times responding to internal stimuli and patient appears to be hallucinati interruptions at night, re-orienting cues (e.g. clocks, calendars), familiar staff when possible    –If patient worsens/fail to improve, further diagnostic considerations include EEG, additional neuroimaging[AS.2]      Neurology service will[AS.1] continue Please refer to prior H&P by Dr. Magalie Arreaga on 12/29/18    History of Present Illness:    Mr. Helane Lundborg is a 68 yo male [employer] sig for bladder ca s/p chemo, BPH, CKD 3, CAD, depression, HTN, HLD, SAM on cpap, PVD, chronic low back pain, chronic opiate use, obesit - likely multifactorial delirium, pt also not sleeping well; sleping better now, encourage re-orientation  -ensure hearing aids in place  - pulling at lines, pulled picc on 1/16, PICC now placed again 1/23 now that MS improved  -seroquel 100mg QHS     # In - intra op blood loss reportedly of 1.3- 2L  -prior hgb 7.8-. 7.2 -> 7.8- > 7.5 -> 6.9 (POST OP) -> 8.2-> 6.9 -> 1 unit ordered -> 8 -> 7.1  ----> 6.3 -> 1 unit-> 7.3 -> 6.9---> 7.8-> 7.9-> 8.2-> 8.3-> 8.2-> 8.3 -->7.9  - s/p 8 Units of pRBC's on 12/31, 1 diastolic dys, mild MR, TR     #SAM on CPAP  -cpap protocol  -encourage use with all sleep     #PAD (peripheral artery disease) ([facility]  -resumed aspirin, once h/h stable but would recommend to hold plavix  surgery     #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 cataplexy Napping, asking to walk    GEN: NAD  HEENT: NC AT  Neck: Supple, no JVD  Pulm: CTAB, no crackles or wheezes  CV: RRR, no murmurs  ABD: Soft, non-tender, non-distended, +BS  MSK: moves all extremities, dressing and wound vac in place on right hip, right upp docusate sodium 100 MG Caps  Commonly known as:  COLACE     escitalopram 20 MG Tabs  Commonly known as:  LEXAPRO  Take 1 tablet (20 mg total) by mouth daily.      ferrous sulfate 325 (65 FE) MG Tbec  Take 1 tablet (325 mg total) by mouth 2 (two) times daily OPERATORErjimenez Rodriguez MD CLINICAL INDICATION: Status post right total hip arthroplasty, with postoperative bleeding FLUOROSCOPY TIME: 6.2 min FLUOROSCOPY DOSE: 454 mGy CONTRAST: C02 plus 16 mL isovue TECHNIQUE AND FINDINGS: Following discussion of the indication Xr Hip W Or Wo Pelvis 1 View, Right (cpt=73501)    Result Date: 12/31/2018  PROCEDURE: XR HIP W OR WO PELVIS 1 VIEW, RIGHT (CPT=73501)  COMPARISON: [facility], XR HIP W OR WO PELVIS 1 VIEW, RIGHT (CPT=73501), 12/18/2018, 8:59.   INDICATIONS: RIGHT HIP  INDICATIONS: 67-year-old man status post revision of right hip arthroplasty with persistent subcutaneous seroma of right hip.   : MD Viviane  ANESTHESIA: The patient received intravenous fentanyl while undergoing continuous physiologic 1/14/2019 at 13:51          Ct Lower Ext Right (wo Iv)(cpt=73700)    Result Date: 1/9/2019  PROCEDURE: CT LOWER EXT RIGHT (WO IV) (CPT=73700)  COMPARISON: [facility], XR HIP W OR WO PELVIS 1 VIEW, RIGHT (CPT=73501), 12/18/2018, 8:59.   [facility] bones section above.   There is a fracture around the intertrochanteric region of the arthroplasty, and between the arthroplasty and anterior portion of the residual bone there is a 55 x 32 x 70 mm soft tissue focus which could represent a combination of me determined. This likely represents a postprocedural fluid collection such as a seroma however a hematoma or infectious collection could give this appearance.   Due to beam hardening artifact caused by a right hip arthroplasty it is difficult to determine t Important follow up:  -PCP in [x] within 7 days [] within 14 days [] other    Aman Tang,   [address]    In 1 week      [facility] [phone]- #Encephalopathy  - confused intermittently, some hallucinations, much improved   - no signs of acute infection  - only on tylenol and occasionally on tramadol for pain, will try to limit to just tylenol; pt denies pain currently  - Previously on percocet f -likely due to poor PO intake with confusion  - s/p D5W, trial off IVF now, renal following     Left upper ext cellulitis   - erythema, no swelling  -s/p Vanco since 1/14  - ID following     Acute blood loss anemia  -multifactorial with CKD, infection, rec       #Essential hypertension- bp low normal initially but increased prior to d/c  -continue BB   -holding arb for now     #Ischemic cardiomyopathy EF 55%  -monitor fluid status closely  -continue BB, resume arb when able  - ECHO 12/6/18  EF 50-55%, LVH mi -anticipate 6 weeks of IV meropenem and PO fluconazole until 2/12/19 followed by suppressive abx if no plans for 2 stage    - Cont asa, statin, beta blocker  - Resume plavix when ok from surgical standpoint    Day of discharge Exam    01/23/19  1433   BP: prescribed for you. Read the directions carefully, and ask your doctor or other care provider to review them with you.             CONTINUE taking these medications    acetaminophen 325 MG Tabs  Commonly known as:  TYLENOL     allopurinol 300 MG Tabs  Commo IP CONSULT TO PSYCHIATRY  IP CONSULT TO NEPHROLOGY  IP CONSULT TO PHARMACY  IP CONSULT TO INFECTIOUS DISEASE  IP CONSULT TO NEUROLOGY    Radiology: Ir Arteriogram Lower Extremity    Result Date: 1/22/2019  PROCEDURE(S): Right lower extremity arteriogram MT with a vascular closure device. The patient tolerated the procedure without complication and left the department in satisfactory condition. IMPRESSION: Right lower extremity proximity arteriogram, revealing no arterial source of hemorrhage.   There is diffu mid femur at the level of the distal cerclage wire.   Dictated by (CST): Christopher Montemayor MD on 12/31/2018 at 18:27     Approved by (CST): Christopher Montemayor MD on 12/31/2018 at 18:33          Ir Miscellaneous    Result Date: 1/14/2019  PROCEDURE: LAURYN Casillas subcutaneous fluid collection of right hip. Aspirated serosanguineous fluid has been submitted for culture. 2. Drainage catheter is to remain attached to TOOTIE bulb. The drainage catheter will be removed when output is less than 10 mL per day.      Dictated by of the arthroplasty and the residual proximal femur at the level of the intertrochanteric region, likely sequela of prior fracture and prior procedure in this region and may represent a combination of medullary bone and blood products.   There is suggestion gross anatomic position. 2.  There are skin staples over the lateral aspect of the right hip.   Within the subcutaneous fat beneath the skin staples there is a fluid collection measuring approximately 6 x 3 x 20 cm (AP x transverse x CC) however it is not c PHYSICAL THERAPY TREATMENT NOTE - INPATIENT     Room Number: 442/442-A       Presenting Problem: revision of L JOHN with antibiotic spacer placed    Problem List  Active Problems:    Delirium    Wound infection    Episodic mood disorder ([facility]      PHYSICAL DISCHARGE RECOMMENDATIONS  PT Discharge Recommendations: Sub-acute rehabilitation     PLAN  PT Treatment Plan: Bed mobility; Body mechanics; Endurance; Energy conservation;Patient education;Gait training;Strengthening;Transfer training;Balance training    SUB FUNCTIONAL ABILITY STATUS  Gait Assessment   Gait Assistance: Not tested  Distance (ft): (/)  Assistive Device: (/)  Stoop/Curb Assistance: Not tested  Comment : pt performed rolling x 2 to L and x 1 to R at MOD A, able to hold self with rail on side ~2 mi AO.1 Licha Miller, April, PT on 1/22/2019  3:41 PM               Physical Therapy Note signed by Ivelisse Meek PT at 1/22/2019  3:53 PM  Version 1 of 2    Author:   Ivelisse Meek PT Service:  Rehab Author Type:  Physical Therapist    Filed:  1/22/201 good understanding. Pt left seated in bedside chair, all needs in place, ALARM ON, RN aware. The patient's Approx Degree of Impairment: 92.36% has been calculated based on documentation in the [facility] '6 clicks' Inpatient Basic Mobility Short Form.   Res wheelchair, bedside commode, etc.): Unable   -   Moving from lying on back to sitting on the side of the bed?: Unable   How much help from another person does the patient currently need. ..   -   Moving to and from a bed to a chair (including a wheelchair)? Goal #4 Patient will tolerate standing x 3 min with BUE support on RW and maintain TTWB RLE   Goal #4   Current Status NT   Goal #5 Patient verbalizes and/or demonstrates all precautions and safety concerns independently   Goal #5   Current Status IN Demetrio was able to maintain sitting balance w/ RUE support on bedrail & Max A x 2 for about 60 seconds. This was repeated a second time. Pt completed ankle pumps. At the end of the session, pt was left supine in bed w/ bed alarm on.      The patient's Approx Degre How much help from another person does the patient currently need. ..   -   Moving to and from a bed to a chair (including a wheelchair)?: Total   -   Need to walk in hospital room?: Total   -   Climbing 3-5 steps with a railing?: Total     AM-PAC Score:  R Occupational Therapy Notes (last 72 hours) (Notes from 1/20/2019  5:30 PM through 1/23/2019  5:30 PM)      Occupational Therapy Note signed by Rasheed Kerr at 1/23/2019  1:09 PM  Version 1 of 1    Author:  Rasheed Kerr Service:  — Author Type:  Elissa Qureshi --pt hard of hearing. Pt appropriately conversing. This session focused on pt's activity tolerance, participation, and strengthening. Pt with bowel movement and was incontinent of bladder.  With assist of PCT, pt completed log rolls with Mod A + physical as -   Toileting, which includes using toilet, bedpan or urinal? : Total  -   Putting on and taking off regular upper body clothing?: A Lot  -   Taking care of personal grooming such as brushing teeth?: A Little  -   Eating meals?: A Lot    AM-PAC Score:  Sco Problem List  Active Problems:    Wound infection    Episodic mood disorder ([facility]      OCCUPATIONAL THERAPY ASSESSMENT     RN cleared pt for participation in occupational therapy session, which was completed in collaboration with physical therapy.  Upon arr OT Discharge Recommendations: Sub-acute rehabilitation  OT Device Recommendations: TBD    PLAN  OT Treatment Plan: UE strengthening/ROM; Cognitive reorientation    SUBJECTIVE[KH.1]  Agreeable to activity   \"don't ask about [place], I don't think I'm going Patient will engage in [facility] seated EOB with Min A for sitting balance  Comment: pt unable at this time secondary to lethargy; completed long sit  In supine   Pt will perform bed mobiltiy with mod A for particiaption in supine self care 1x  Comment: dep.  Pt

## (undated) NOTE — IP AVS SNAPSHOT
[facility]            (For Outpatient Use Only) Initial Admit Date: 12/17/2018   Inpt/Obs Admit Date: Inpt: 12/17/18 / Obs: N/A   Discharge Date:    Maximino Baltazar:  [de-identified]   MRN: [de-identified]   CSN: [id number]        ENCOUNTER  [facility] Account Financial Class: Medicare Advantage    December 22, 2018

## (undated) NOTE — IP AVS SNAPSHOT
[facility]            (For Outpatient Use Only) Initial Admit Date: 3/1/2019   Inpt/Obs Admit Date: Inpt: 3/1/19 / Obs: N/A   Discharge Date:    Ale Amaro:  [de-identified]   MRN: [de-identified]   CSN: [id number]        ENCOUNTER  Patient Class: Hospital Account Financial Class: Medicare Advantage    March 5, 2019

## (undated) NOTE — IP AVS SNAPSHOT
Patient Demographics     Address  [address] [place] [address] Phone  [phone] (Home) *Preferred*  [phone] [facility]) E-mail Address  [email or web address]      Emergency Contact(s)     Name Relation Home Work [address] Son Take 1 tablet (81 mg total) by mouth daily. Reginaldo Helm MD         bacitracin 500 UNIT/GM Oint  Commonly known as:  bacitracin  Next dose due:  TOMORROW MORNING      Apply 2 g topically daily.  To right hip          Ciprofloxacin HCl 250 MG Tabs omeprazole 20 MG Cpdr  Commonly known as:  PRILOSEC  Next dose due:  TOMORROW MORNING      Take 40 mg by mouth every morning before breakfast.          PEG 3350 Pack  Commonly known as:  MIRALAX  Next dose due:  AS NEEDED      Take 17 g by mouth daily as n [id number] Losartan Potassium (COZAAR) tab 25 mg 03/26/19 1005 Given      [id number] Metoprolol Succinate ER (Toprol XL) 24 hr tab 25 mg 03/26/19 1006 Given      [id number] Normal Saline Flush 0.9 % injection 10 mL 03/25/19 2037 Given      [id number] Normal S [id number] nystatin (MYCOSTATIN) 628369 UNIT/GM cream 1 Application 65/68/94 1337 Given      [id number] nystatin (MYCOSTATIN) 117041 UNIT/GM cream 1 Application 51/66/75 3543 Given              Recent Vital Signs       Most Recent Value   Vitals  136/66 Edmar RBC 2.92 3.80 - 5.80 x10(6)uL  [facility]   HGB 8.0 13.0 - 17.5 g/dL  [facility]   HCT 26.2 39.0 - 53.0 %  [facility]   MCV 89.7 80.0 - 100.0 fL — [facility]   MCH 27.4 26.0 - 34.0 pg — [facility]   MCHC 30.5 31.0 - 37.0 g/dL  [facility]   RDW Kayden Gaytan is a(n) 69year old male who has a pmh of bladder cancer, CKD stage 3, BPH, Major depression, chronic urinary retention who was directly admitted for a right hip fracture. Patient had surgery on his left hip about 5 years ago.  He was recen Performed by Ann Jaime DO at [address] [facility] MAIN OR   • HIP TOTAL ARTHROPLASTY REVISION Right 12/6/2018    Performed by Ann Jaime DO at [facility] MAIN OR   • SPINE SURGERY PROCEDURE UNLISTED      fusion   • TOTAL HIP REPLACEMENT Right 98, 99   • TOTAL HIP RE PEG 3350 Oral Powd Pack Take 17 g by mouth daily as needed. Losartan Potassium 25 MG Oral Tab Take 1 tablet (25 mg total) by mouth daily. docusate sodium 100 MG Oral Cap Take 100 mg by mouth 2 (two) times daily.    allopurinol 300 MG Oral Tab Take 1 tab evidence of gum bleeding. NECK:  Supple. There was no JVD. CHEST:  Symmetrical movement on inspiration  HEART:  S1 and S2 heard. RRR   LUNGS:  Air entry was good. No crackles or wheezes   ABDOMEN: Soft and non-tender. Bowel sounds were present.   MUS Electronically signed by Negra Junior MD on 3/22/2019  4:17 PM   Attribution Perez    SH. 1 - Negra Junior MD on 3/22/2019  4:08 PM                        Consults - MD Consult Notes      Consults signed by Ricardo Benites DO at 3/24/2019 12:23 PM • Bladder cancer (HCC)    • BPH (benign prostatic hyperplasia)    • CKD (chronic kidney disease) stage 3, GFR 30-59 ml/min (HCC)    • Coronary atherosclerosis    • DDD (degenerative disc disease), lumbar    • Depression    • Eczema    • High blood pressure alteplase (ACTIVASE) 2 mg in Sterile Water for Injection 2.2 mL IV push 2 mg Intravenous Once   alteplase (ACTIVASE) 2 mg in Sterile Water for Injection 2.2 mL IV push 2 mg Intravenous Once   Vancomycin HCl (VANCOCIN) 1,500 mg in sodium chloride 0.9% 500 m morphINE sulfate (PF) 2 MG/ML injection 1 mg 1 mg Intravenous Q2H PRN   Or      morphINE sulfate (PF) 2 MG/ML injection 2 mg 2 mg Intravenous Q2H PRN   Or      morphINE sulfate (PF) 4 MG/ML injection 4 mg 4 mg Intravenous Q2H PRN   ondansetron HCl (ZOFRAN) Lidocaine 4 % External Patch Apply 1 patch topically daily. ASPERCREME LIDOCAINE EX Apply 1 Application topically daily. Diclofenac Sodium 1 % Transdermal Gel Apply 1 g topically 3 (three) times daily as needed.    apixaban 2.5 MG Oral Tab Take 10 mg by gentle range of motion laterally in his hip, he has good strength and stability the ankle joint and plantar flexion dorsiflexion with warm pink toes    Results:     Laboratory Data:  Lab Results   Component Value Date    WBC 4.5 03/24/2019    HGB 7.8 (L) 0 as little as he is ambulating is very unlikely this fracture will displace more. I feel an abduction brace would be more of a fall risk to him than a benefit. He can be weightbearing as tolerated.   I would like him to avoid active abduction exercises whi for a blood transfuion in 3/2019. He then went back to [facility] rehab and fell at rehab and sufferred a right hip fracture.  He was then sent to [facility]. He was then tranfused 1 unit of blood and was then directly admitted here at [facility] - Dr. Rodriguez Loveless consulted, non operative management  - PT/OT- WBAT, no active ABduction of L leg  - pain control'    # RUE DVT associated with PICC line  - had nonfunctioning PICC  - RUE doppler with +DVT, PICC now removed, already on anticoagulation for recen -Tylenol and tramadol for pain     #Obesity- BMI 36  -o/p weight loss endeavors     #Insomnia  -seroquel started per psych last admission with delerium/AMS  -back to 100 mg nightly per psyc,      #Depression:  -escitalopram 20 mg daily     #SAM on CPAP  -c CONCLUSION:  * Right PICC line has been removed. * A new PICC line has been inserted from the left into the superior vena cava without complication. * Heart remains normal in size. * Lungs are clear. Dictated by (CST):  Tavia Castanon MD on 3/25/201 Commonly known as:  CIPRO     Diclofenac Sodium 1 % Gel  Commonly known as:  VOLTAREN     docusate sodium 100 MG Caps  Commonly known as:  COLACE     escitalopram 10 MG Tabs  Commonly known as:  LEXAPRO  Take 1 tablet (10 mg total) by mouth daily.      ferr Andrea [address] [place] [address] instructions:       Other Discharge Instructions:       Continue IV vancomycin for 7 more days, follow-up with Dr. Laurie Cody with labs: weekly BM provide assist as needed. Pt received supine in bed, agreeable to therapy. Pt transferred from supine to sit at MAX A x 3 (TOTAL ASSIST) via swivel with draw sheet. Pt tolerated seated at EOB x 10 min with MOD-MAX A for seated balance.  BLEs extended initia PT Treatment Plan: Bed mobility; Body mechanics; Energy conservation;Patient education;Transfer training;Balance training;Strengthening;Range of motion  Rehab Potential : Poor  Frequency (Obs): 3x/week     PHYSICAL THERAPY MEDICAL/SOCIAL HISTORY     History Closed right hip fracture [facility])      Past Medical History  Past Medical History:   Diagnosis Date   • Anesthesia complication     DIFFICULTY WAKING UP AND CONFUSION   • Back problem     chronic back pain   • Bladder cancer (HCC)    • BPH (benign prostati Precautions: Bed/chair alarm;Limb alert - right;Limb alert - left(air mattress)  Fall Risk: High fall risk    WEIGHT BEARING RESTRICTION  Weight Bearing Restriction: R lower extremity;L lower extremity   R Lower Extremity: Weight Bearing as Tolerated  L Lo Gait Assistance: Not tested  Stoop/Curb Assistance: Not tested  Comment : transferred to EOB at 3 person MAX A; tolerated seated at EOB x 10 min at MAX A for balance    Bed Mobility:[AO.1] supine <> sit transfer at 3 person MAX A (total assist) via swivel Occupational Therapy Note signed by Anjana Grace OT at 3/25/2019  4:01 PM  Version 1 of 1    Author:  Anjana Grace OT Service:  — Author Type:  Occupational Therapist    Filed:  3/25/2019  4:01 PM Date of Service:  3/25/2019  3:47 P with continued fluctuating Mod to Max assist. Pt became fatigued. Returned pt to bed with total A (Max A x 3).  Pt adjusted in bed with total A.  Pt's upper extremity strength and range of motion are [facility] for oral facial hygiene, upper extremity dressing, an -   Putting on and taking off regular lower body clothing?: Total  -   Bathing (including washing, rinsing, drying)?: Total  -   Toileting, which includes using toilet, bedpan or urinal? : Total  -   Putting on and taking off regular upper body clothing?: :  Pari Britt OT (Occupational Therapist)       OCCUPATIONAL THERAPY EVALUATION - INPATIENT      Room Number: 417/417-A  Evaluation Date: 3/23/2019  Type of Evaluation: Initial  Presenting Problem: (New L periprosthetic fx)    Physician Order: IP scan at [facility] comminuted and displaced fracture through the left greater trochanter. \"     Pt received in bed, reporting 6/10 B hip pain, agreeable to therapy, however unable to tolerate much.  Pt yelling out in pain with any minimal movement of BLEs • Back problem     chronic back pain   • Bladder cancer (HCC)    • BPH (benign prostatic hyperplasia)    • CKD (chronic kidney disease) stage 3, GFR 30-59 ml/min (HCC)    • Coronary atherosclerosis    • DDD (degenerative disc disease), lumbar    • Depressi R Lower Extremity: Full Weight Bearing  L Lower Extremity: Full Weight Bearing    PAIN ASSESSMENT  Ratin(However pt was yelling out in pain with attempts at movement)  Location: (B hips )  Management Techniques: Relaxation;Repositioning      COGNITION Patient End of Session: In bed;Needs met;Call light within reach;RN aware of session/findings; All patient questions and concerns addressed;SCDs in place; Alarm set    OT Goals    Patient self-stated goal is: Unable to state his goals due to increased pain a

## (undated) NOTE — IP AVS SNAPSHOT
Patient Demographics     Address  [address] [place] [address] Phone  [phone] (Home) *Preferred*  [phone] [facility]) E-mail Address  [email or web address]      Emergency Contact(s)     Name Relation Home Work [address] Son docusate sodium 100 MG Caps  Commonly known as:  COLACE  Next dose due:  tonight      Take 100 mg by mouth 2 (two) times daily. Germania Bacon,          ertapenem 1 g 1 g in sodium chloride 0.9 % 100 mL      Inject 1 g into the vein daily.   Stop taking on: [address], [phone], [address] [facility], [address] [facility] [address]    Phone:  [phone]   aspirin  MG Tbec     Please  your prescriptions at the location directed by your doctor or nurse    Bring [id number] ferrous sulfate EC tab 325 mg 12/22/18 0943 Given              Recent Vital Signs       Most Recent Value   Vitals  166/74  (Abnormal)  Filed at 12/22/2018 1616   Pulse  76 Filed at 12/22/2018 1616   Resp  18 Filed at 12/22/2018 1616   Temp  97. Anaerobic Culture [[id number]] Collected:  12/18/18 1953    Order Status:  Completed Lab Status:  Preliminary result Updated:  12/21/18 1032    Specimen:  Tissue from Hip, right      Anaerobic Culture No Anaerobes to date    Anaerobic Culture [[id number]] C Mrsa Culture No MRSA Isolated         H&P - H&P Note      H&P signed by Addy Bates MD at 12/17/2018  8:03 PM  Version 2 of 2    Author:  Addy Bates MD Service:  Hospitalist Author Type:  Physician    Filed:  12/17/2018  8:03 PM Date of Service:  12/1 -continue to trend, Type and screen up to date[HP. 6]    #[HP. 4]Thrombocytopenia- mild[HP.2]   -no sign of bleeding continue to montitor[HP.5]   [HP.2]  #[HP.4]SAM on CPAP  -cpap protocol  -encourage use with all sleep   [HP.2]  #[HP. 4]PAD (peripheral arter with management of medications by psychiatry. [HP.2] Patient was then discharged to subacute rehab were he was noted to have worsening right hip pain and fevers w/ increased wound drainage. on 12/15 therefor brought to [address] for evaluation. Senna 17.2 MG Oral Tab Take 1 tablet (17.2 mg total) by mouth nightly. Disp:  Rfl: 0   PEG 3350 Oral Powd Pack Take 17 g by mouth daily as needed.  Disp:  Rfl: 0   aspirin 325 MG Oral Tab Take 1 tablet (325 mg total) by mouth 2 (two) times daily for 28 days CV: RRR, no murmurs, 2+ peripheral pulses  ABD: Soft, non-tender, non-distended, +BS  MSK:[HP.1] right hip erythema around incision, serosanguinous drainage[HP. 4]  Neuro: Grossly normal, CN intact, sensory intact  Psych: Affect- normal  SKIN: warm, dry[HP. Mr. Demond Lopez is a 70 yo male with PMH sig for bladder ca s/p chemo, BPH, CKD 3, CAD, depression, HTN, HLD, SAM on cpap, PVD, chronic low back pain, chronic opiate use, obesity bmi 36, ARRIOLA, OA with prior R hip replacement with revision on 12/6 who presents a #Depression:  -escitalopram 20 mg daily[HP. 4]    FN:  - IVF:[HP.1] NS 75[HP.4]  - Diet:[HP.1] NPO[HP. 4]     DVT Prophy:[HP.1] hold until plan for OR finalized[HP. 4]  Lines:[HP.1] PIV[HP. 4]    Dispo: pending clinical course    Outpatient records or previous • Depression    • Eczema    • High blood pressure    • High cholesterol    • Hyperuricemia    • Insomnia    • Ischemic cardiomyopathy 2016    Last ECHO 8/17, EF > 50%, concentric LVH   • ARRIOLA (nonalcoholic steatohepatitis) 2016   • Obesity, Class III, BMI Pravastatin Sodium 20 MG Oral Tab Take 20 mg by mouth daily. Disp:  Rfl:[HP.3]          Soc Hx[HP. 1]  Social History    Tobacco Use      Smoking status: Current Every Day Smoker        Packs/day: 0.50        Years: 20.00        Pack years: 10        Types: to infection. A bone metastasis is another consideration. The appearance is    not typical for an acute fracture.  Comparison with preoperative imaging is    recommended. [HP.5]    Electronically signed by Eric Lee MD on 12/17/2018  5:19 PM   Attributio family at the bed side who helped with it, ID is asked to help with infection and antibiotics,        History:  Past Medical History:   Diagnosis Date   • Anesthesia complication     DIFFICULTY WAKING UP AND CONFUSION   • Back problem     chronic back pain •  furosemide (LASIX) injection 20 mg, 20 mg, Intravenous, Once  •  Normal Saline Flush 0.9 % injection 3 mL, 3 mL, Intravenous, PRN  •  acetaminophen (TYLENOL) tab 650 mg, 650 mg, Oral, Q6H PRN  •  morphINE sulfate (PF) 2 MG/ML injection 1 mg, 1 mg, Intra Constitutional: Negative for anorexia, chills, fatigue, fevers, malaise, night sweats and weight loss. Eyes: Negative for visual disturbance, irritation and redness.   Ears, nose, mouth, throat, and face: Negative for hearing loss, tinnitus, nasal congesti Throat: Lips, mucosa, and tongue normal.  No thrush noted. Neck: Soft, supple neck; trachea midline, no adenopathy, no thyromegaly. Lungs: CTAB, normal and equal bilateral chest rise   Chest wall: No tenderness or deformity.    Heart: Regular rate and Obesity, Class III, BMI 40-49.9 (morbid obesity) (HCC)     SAM on CPAP     PAD (peripheral artery disease) (HCC)     ARRIOLA (nonalcoholic steatohepatitis)     CKD (chronic kidney disease) stage 3, GFR 30-59 ml/min (HCC)     BPH (benign prostatic hyperplas No notes of this type exist for this encounter.          Physical Therapy Notes (last 72 hours) (Notes from 12/19/2018  4:52 PM through 12/22/2018  4:52 PM)      Physical Therapy Note signed by Carrillo Gaffney PT at 12/22/2018  1:16 PM  Version 1 of 1 BALANCE[AD.1]  Static Sitting: Fair -  Dynamic Sitting: Fair -  Static Standing: Poor  Dynamic Standing: Poor[AD. 2]  ACTIVITY TOLERANCE            limited by pt's report of fatigue             O2 WALK                  AM-PAC '6-Clicks' INPATIENT SHORT FORM assistance level: modified independent    Goal #3   Current Status Pt AMB about 5' with the RW min A x2 and with chair follow for safety.  Am  SPT mod A with the RW x2 reps   Goal #4 Patient will negotiate 10 stairs/one curb w/ assistive device and supervis chair then to the bed. Assist with genie care with the PCT in the bathroom. Returned pt back to the bed with max A x2. Pt5 is left in the bed with all needs within reach. Handed pt off to the PCT in the room.  Reported to the RN on the status of the pt.[RM.3 -   Moving to and from a bed to a chair (including a wheelchair)?: A Lot   -   Need to walk in hospital room?: A Lot   -   Climbing 3-5 steps with a railing?: Total[RM. 2]     AM-PAC Score:[RM.1]  Raw Score: 11   Approx Degree of Impairment: 72.57%   Christin Cevallos Goal #4 Patient will negotiate 10 stairs/one curb w/ assistive device and supervision   Goal #4   Current Status NT   Goal #5 Patient verbalizes and/or demonstrates all precautions and safety concerns independently   Goal #5   Current Status IN PROGRESS: P Presenting Problem: S/P Post THR RLE I&D Abx beads s/p infection  Reason for Therapy: Mobility Dysfunction and Discharge Planning    PHYSICAL THERAPY ASSESSMENT     Patient is a 69year old male admitted 12/17/2018 for RLE THR post precautions s/o infectio Problem List  Active Problems:    Joint infection [facility])      Past Medical History  Past Medical History:   Diagnosis Date   • Anesthesia complication     DIFFICULTY WAKING UP AND CONFUSION   • Back problem     chronic back pain   • Bladder cancer ([facility] [address] it's been weeks since I have walked.      PHYSICAL THERAPY EXAMINATION     OBJECTIVE  Precautions: JOHN - posterior(no active ABD of the RLE hip / abd wedge)  Fall Risk: High fall risk    WEIGHT BEARING RESTRICTION  Weight Bearing Restriction: R lower extrem Gait Assistance:  Moderate assistance  Distance (ft): 6  Assistive Device: Rolling walker  Pattern: R Decreased stance time;L Foot flat(decreased step length, shabbir limited floor clearance)  Stoop/Curb Assistance: Not tested  Comment : Abd wedge in bed an PM  Version 1 of 1    Author:  Franki Foster OT Service:  — Author Type:  Occupational Therapist    Filed:  12/22/2018  2:25 PM Date of Service:  12/22/2018  2:24 PM Status:  Signed    :  Franki Foster OT (Occupational Therapist)       Attempted to w x2 for sit to stand from high bed level and max. A x2 for sit to stand from low chair. Pt required min. A x2 for bed to chair with RW. Pt visibly fatigued, required rest break. Pt noted to be incontinent of urine, required max.  A for clean up and donning n Standardized Score (AM-PAC Scale): 29.04  CMS Modifier (G-Code): CL    FUNCTIONAL TRANSFER ASSESSMENT  Supine to Sit : Maximum assistance(x2)  Sit to Stand: Moderate assistance(x2)[MS.1] w/RW[MS.2]  Toilet Transfer:[MS.1] n/t[MS. 2]   Shower Transfer:[MS.1] Occupational Therapy Note signed by Leanna Mullen OT at 12/20/2018  3:57 PM  Version 1 of 1    Author:  Leanna Mullen OT Service:  Rehab Author Type:  Occupational Therapist    Filed:  12/20/2018  3:57 PM Date of Service:  12/20/2018  1:13 PM Status:  Signed patient is below baseline and would benefit from skilled inpatient OT to address the above deficits, maximizing patient's ability to return to prior level of function.     The patient's Approx Degree of Impairment: 74.7% has been calculated based on emerita • HIP TOTAL ARTHROPLASTY REVISION Right 12/18/2018    Performed by An Bird DO at [facility] OR   • [address] Right 12/6/2018    Performed by An Bird DO at [facility] OR   • [address]      fusion -   Taking care of personal grooming such as brushing teeth?: A Lot  -   Eating meals?: A Little    AM-PAC Score:  Score: 10  Approx Degree of Impairment: 74.7%  Standardized Score (AM-PAC Scale): 27.31  CMS Modifier (G-Code): CL    FUNCTIONAL TRANSFER ASS Pneumovax 23 12/12/17     Zoster Vaccine Live (Zostavax) 01/01/16